# Patient Record
Sex: MALE | Race: OTHER | NOT HISPANIC OR LATINO | URBAN - METROPOLITAN AREA
[De-identification: names, ages, dates, MRNs, and addresses within clinical notes are randomized per-mention and may not be internally consistent; named-entity substitution may affect disease eponyms.]

---

## 2018-08-02 ENCOUNTER — INPATIENT (INPATIENT)
Facility: HOSPITAL | Age: 78
LOS: 4 days | Discharge: HOME | DRG: 246 | End: 2018-08-07
Attending: INTERNAL MEDICINE | Admitting: INTERNAL MEDICINE
Payer: MEDICAID

## 2018-08-02 VITALS
SYSTOLIC BLOOD PRESSURE: 151 MMHG | HEIGHT: 66 IN | WEIGHT: 177.69 LBS | HEART RATE: 82 BPM | DIASTOLIC BLOOD PRESSURE: 83 MMHG | OXYGEN SATURATION: 98 % | RESPIRATION RATE: 19 BRPM | TEMPERATURE: 98 F

## 2018-08-02 DIAGNOSIS — I21.3 ST ELEVATION (STEMI) MYOCARDIAL INFARCTION OF UNSPECIFIED SITE: ICD-10-CM

## 2018-08-02 DIAGNOSIS — Z98.890 OTHER SPECIFIED POSTPROCEDURAL STATES: Chronic | ICD-10-CM

## 2018-08-02 LAB
ALBUMIN SERPL ELPH-MCNC: 4.5 G/DL — SIGNIFICANT CHANGE UP (ref 3.3–5)
ALP SERPL-CCNC: 78 U/L — SIGNIFICANT CHANGE UP (ref 40–120)
ALT FLD-CCNC: 21 U/L — SIGNIFICANT CHANGE UP (ref 10–45)
ANION GAP SERPL CALC-SCNC: 12 MMOL/L — SIGNIFICANT CHANGE UP (ref 5–17)
APTT BLD: 32.4 SEC — SIGNIFICANT CHANGE UP (ref 27.5–37.4)
APTT BLD: >200 SEC — CRITICAL HIGH (ref 27.5–37.4)
AST SERPL-CCNC: 27 U/L — SIGNIFICANT CHANGE UP (ref 10–40)
BILIRUB SERPL-MCNC: 0.6 MG/DL — SIGNIFICANT CHANGE UP (ref 0.2–1.2)
BLD GP AB SCN SERPL QL: NEGATIVE — SIGNIFICANT CHANGE UP
BUN SERPL-MCNC: 10 MG/DL — SIGNIFICANT CHANGE UP (ref 7–23)
CALCIUM SERPL-MCNC: 8.8 MG/DL — SIGNIFICANT CHANGE UP (ref 8.4–10.5)
CHLORIDE SERPL-SCNC: 99 MMOL/L — SIGNIFICANT CHANGE UP (ref 96–108)
CHOLEST SERPL-MCNC: 109 MG/DL — SIGNIFICANT CHANGE UP (ref 10–199)
CK MB BLD-MCNC: 1.8 % — SIGNIFICANT CHANGE UP (ref 0–3.5)
CK MB BLD-MCNC: 3.4 % — SIGNIFICANT CHANGE UP (ref 0–3.5)
CK MB CFR SERPL CALC: 5.1 NG/ML — SIGNIFICANT CHANGE UP (ref 0–6.7)
CK MB CFR SERPL CALC: 8.8 NG/ML — HIGH (ref 0–6.7)
CK SERPL-CCNC: 261 U/L — HIGH (ref 30–200)
CK SERPL-CCNC: 290 U/L — HIGH (ref 30–200)
CO2 SERPL-SCNC: 22 MMOL/L — SIGNIFICANT CHANGE UP (ref 22–31)
CREAT SERPL-MCNC: 1.09 MG/DL — SIGNIFICANT CHANGE UP (ref 0.5–1.3)
GLUCOSE SERPL-MCNC: 103 MG/DL — HIGH (ref 70–99)
HBA1C BLD-MCNC: 5.5 % — SIGNIFICANT CHANGE UP (ref 4–5.6)
HCT VFR BLD CALC: 42.7 % — SIGNIFICANT CHANGE UP (ref 39–50)
HDLC SERPL-MCNC: 43 MG/DL — SIGNIFICANT CHANGE UP (ref 40–125)
HGB BLD-MCNC: 14.6 G/DL — SIGNIFICANT CHANGE UP (ref 13–17)
INR BLD: 1.36 RATIO — HIGH (ref 0.88–1.16)
LIPID PNL WITH DIRECT LDL SERPL: 62 MG/DL — SIGNIFICANT CHANGE UP
MAGNESIUM SERPL-MCNC: 2 MG/DL — SIGNIFICANT CHANGE UP (ref 1.6–2.6)
MCHC RBC-ENTMCNC: 31.7 PG — SIGNIFICANT CHANGE UP (ref 27–34)
MCHC RBC-ENTMCNC: 34.1 GM/DL — SIGNIFICANT CHANGE UP (ref 32–36)
MCV RBC AUTO: 93 FL — SIGNIFICANT CHANGE UP (ref 80–100)
PHOSPHATE SERPL-MCNC: 2.8 MG/DL — SIGNIFICANT CHANGE UP (ref 2.5–4.5)
PLATELET # BLD AUTO: 163 K/UL — SIGNIFICANT CHANGE UP (ref 150–400)
POTASSIUM SERPL-MCNC: 4.2 MMOL/L — SIGNIFICANT CHANGE UP (ref 3.5–5.3)
POTASSIUM SERPL-SCNC: 4.2 MMOL/L — SIGNIFICANT CHANGE UP (ref 3.5–5.3)
PROT SERPL-MCNC: 8.1 G/DL — SIGNIFICANT CHANGE UP (ref 6–8.3)
PROTHROM AB SERPL-ACNC: 14.8 SEC — HIGH (ref 9.8–12.7)
RBC # BLD: 4.59 M/UL — SIGNIFICANT CHANGE UP (ref 4.2–5.8)
RBC # FLD: 11.9 % — SIGNIFICANT CHANGE UP (ref 10.3–14.5)
RH IG SCN BLD-IMP: NEGATIVE — SIGNIFICANT CHANGE UP
SODIUM SERPL-SCNC: 133 MMOL/L — LOW (ref 135–145)
TOTAL CHOLESTEROL/HDL RATIO MEASUREMENT: 2.5 RATIO — LOW (ref 3.4–9.6)
TRIGL SERPL-MCNC: 20 MG/DL — SIGNIFICANT CHANGE UP (ref 10–149)
TROPONIN T, HIGH SENSITIVITY RESULT: 23 NG/L — SIGNIFICANT CHANGE UP (ref 0–51)
TROPONIN T, HIGH SENSITIVITY RESULT: 90 NG/L — HIGH (ref 0–51)
WBC # BLD: 6.3 K/UL — SIGNIFICANT CHANGE UP (ref 3.8–10.5)
WBC # FLD AUTO: 6.3 K/UL — SIGNIFICANT CHANGE UP (ref 3.8–10.5)

## 2018-08-02 PROCEDURE — 99223 1ST HOSP IP/OBS HIGH 75: CPT | Mod: GC

## 2018-08-02 PROCEDURE — 93306 TTE W/DOPPLER COMPLETE: CPT | Mod: 26

## 2018-08-02 PROCEDURE — 93458 L HRT ARTERY/VENTRICLE ANGIO: CPT | Mod: 26,59

## 2018-08-02 PROCEDURE — 92941 PRQ TRLML REVSC TOT OCCL AMI: CPT | Mod: LD

## 2018-08-02 PROCEDURE — 99152 MOD SED SAME PHYS/QHP 5/>YRS: CPT

## 2018-08-02 PROCEDURE — 93010 ELECTROCARDIOGRAM REPORT: CPT

## 2018-08-02 RX ORDER — HEPARIN SODIUM 5000 [USP'U]/ML
INJECTION INTRAVENOUS; SUBCUTANEOUS
Qty: 25000 | Refills: 0 | Status: DISCONTINUED | OUTPATIENT
Start: 2018-08-02 | End: 2018-08-03

## 2018-08-02 RX ORDER — HEPARIN SODIUM 5000 [USP'U]/ML
6500 INJECTION INTRAVENOUS; SUBCUTANEOUS ONCE
Qty: 0 | Refills: 0 | Status: COMPLETED | OUTPATIENT
Start: 2018-08-02 | End: 2018-08-02

## 2018-08-02 RX ORDER — ATORVASTATIN CALCIUM 80 MG/1
80 TABLET, FILM COATED ORAL AT BEDTIME
Qty: 0 | Refills: 0 | Status: DISCONTINUED | OUTPATIENT
Start: 2018-08-02 | End: 2018-08-06

## 2018-08-02 RX ORDER — CLOPIDOGREL BISULFATE 75 MG/1
75 TABLET, FILM COATED ORAL DAILY
Qty: 0 | Refills: 0 | Status: DISCONTINUED | OUTPATIENT
Start: 2018-08-02 | End: 2018-08-07

## 2018-08-02 RX ORDER — ASPIRIN/CALCIUM CARB/MAGNESIUM 324 MG
81 TABLET ORAL DAILY
Qty: 0 | Refills: 0 | Status: DISCONTINUED | OUTPATIENT
Start: 2018-08-02 | End: 2018-08-07

## 2018-08-02 RX ORDER — HEPARIN SODIUM 5000 [USP'U]/ML
6500 INJECTION INTRAVENOUS; SUBCUTANEOUS EVERY 6 HOURS
Qty: 0 | Refills: 0 | Status: DISCONTINUED | OUTPATIENT
Start: 2018-08-02 | End: 2018-08-03

## 2018-08-02 RX ORDER — HEPARIN SODIUM 5000 [USP'U]/ML
3000 INJECTION INTRAVENOUS; SUBCUTANEOUS EVERY 6 HOURS
Qty: 0 | Refills: 0 | Status: DISCONTINUED | OUTPATIENT
Start: 2018-08-02 | End: 2018-08-03

## 2018-08-02 RX ADMIN — HEPARIN SODIUM 1500 UNIT(S)/HR: 5000 INJECTION INTRAVENOUS; SUBCUTANEOUS at 20:37

## 2018-08-02 RX ADMIN — HEPARIN SODIUM 6500 UNIT(S): 5000 INJECTION INTRAVENOUS; SUBCUTANEOUS at 20:37

## 2018-08-02 RX ADMIN — ATORVASTATIN CALCIUM 80 MILLIGRAM(S): 80 TABLET, FILM COATED ORAL at 21:06

## 2018-08-02 NOTE — H&P ADULT - NSHPPHYSICALEXAM_GEN_ALL_CORE
OBJECTIVE:  Vital Signs Last 24 Hrs  T(C): 36.4 (02 Aug 2018 12:08), Max: 36.4 (02 Aug 2018 12:08)  T(F): 97.6 (02 Aug 2018 12:08), Max: 97.6 (02 Aug 2018 12:08)  HR: 68 (02 Aug 2018 14:05) (68 - 82)  BP: 127/74 (02 Aug 2018 14:05) (115/74 - 151/83)  BP(mean): 93 (02 Aug 2018 14:05) (89 - 117)  RR: 14 (02 Aug 2018 14:05) (12 - 23)  SpO2: 97% (02 Aug 2018 14:05) (96% - 98%)    08-02 @ 07:01  -  08-02 @ 14:36  --------------------------------------------------------  IN: 0 mL / OUT: 925 mL / NET: -925 mL        General: Alert and cooperative. Not in acute stress. Well developed, well nourished.   Head: Normocephalic, no mass and lesions.  Eyes: Intact visual fields. PERRLA, clear conjunctiva. EOMI, no ptosis.   Throat: Oral cavity and pharynx normal. No inflammation, swelling, exudate, or lesions. Teeth and gingiva in good general condition.  Neck: No lymphadenopathy, no masses, no thyromegaly. Carotid pulses 2+. No JVD.   Respiratory: Bilateral lung clear to auscultation, no crackles, no wheezes, no rhonchi.   Cardiovascular: S1/S2 auscultated, no murmur, or gallop. Rhythm is regular. There is no peripheral edema, cyanosis or pallor. Extremities are warm and well perfused. Capillary refill is less than 2 seconds. No carotid bruits.  Abdomen: Soft, non-tender, nondistended, no guarding or rebound tenderness. Active bowel sounds in all 4 quadrants. No hepatosplenomegaly.   Musculoskeletal: Adequately aligned spine. ROM intact spine and extremities. No joint erythema or tenderness. Normal muscular development. Normal gait.   Extremities: No significant deformity or joint abnormality. Peripheral pulses intact. No varicosities. No peripheral edema, atrophy.   Skin: Intact, no rash. Normal color, texture and turgor with no lesions or eruptions.  Neurological: AOAx4. CN2-12 grosslly intact. Strength and sensation symmetric and intact throughout. Reflexes 2+ throughout. Cerebellar testing normal.  Psychiatry: The mental examination revealed the patient was oriented to person, place, and time. The patient was able to demonstrate good judgement and reason, without hallucinations, abnormal affect or abnormal behaviors during the examination. Patient is not suicidal. OBJECTIVE:  Vital Signs Last 24 Hrs  T(C): 36.4 (02 Aug 2018 12:08), Max: 36.4 (02 Aug 2018 12:08)  T(F): 97.6 (02 Aug 2018 12:08), Max: 97.6 (02 Aug 2018 12:08)  HR: 68 (02 Aug 2018 14:05) (68 - 82)  BP: 127/74 (02 Aug 2018 14:05) (115/74 - 151/83)  BP(mean): 93 (02 Aug 2018 14:05) (89 - 117)  RR: 14 (02 Aug 2018 14:05) (12 - 23)  SpO2: 97% (02 Aug 2018 14:05) (96% - 98%)    08-02 @ 07:01  -  08-02 @ 14:36  --------------------------------------------------------  IN: 0 mL / OUT: 925 mL / NET: -925 mL    General: Alert and cooperative. Not in acute stress. Well developed, well nourished.   Head: Normocephalic, no mass and lesions.  Eyes: Intact visual fields. PERRLA, clear conjunctiva. EOMI, no ptosis.   Neck: No lymphadenopathy, no masses. No JVD.   Respiratory: Bilateral lung clear to auscultation, no crackles, no wheezes, no rhonchi.   Cardiovascular: S1/S2 auscultated, no murmur, or gallop. Rhythm is regular. There is no peripheral edema, cyanosis or pallor. Extremities are warm and well perfused.  Abdomen: Soft, non-tender, nondistended.  Active bowel sounds in all 4 quadrants.   Extremities: No significant deformity or joint abnormality. Peripheral pulses intact. No peripheral edema.  Skin: Intact, no rash.   Neurological: AOAx4. CN2-12 grosslly intact.

## 2018-08-02 NOTE — H&P ADULT - NSHPLABSRESULTS_GEN_ALL_CORE
14.6   6.3   )-----------( 163      ( 02 Aug 2018 13:52 )             42.7     Hgb Trend: 14.6<--  08-02    133<L>  |  99  |  10  ----------------------------<  103<H>  4.2   |  22  |  1.09    Ca    8.8      02 Aug 2018 13:52  Phos  2.8     08-02  Mg     2.0     08-02    TPro  8.1  /  Alb  4.5  /  TBili  0.6  /  DBili  x   /  AST  27  /  ALT  21  /  AlkPhos  78  08-02    Creatinine Trend: 1.09<--

## 2018-08-02 NOTE — H&P ADULT - ASSESSMENT
77 year old Lao male with past medical history of HTN and HLD, presenting from outside ED with chest pain and EKG consistent with a STEMI (ST elevation in II, III and AVF), given , Plavix 600, Lopressor 25 and Heparin 5000 U at OSH. 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 EF 40% and LVEDP 14    Neuro   No active issues    Resp  No active issues   Card  Will c/w ASA 81, Plavix 75, Lipitor 80 (hold home Atenolol – would use Lopressor in the acute setting if BP stable, clinically not in HF), hold home Captopril (given that pt will get staged tomorrow and will receive another contrast load), hold home Isordil as well. Check TTE 77 year old Croatian male with past medical history of HTN and HLD, presenting from outside ED with chest pain and EKG consistent with a STEMI (ST elevation in II, III and AVF), given , Plavix 600, Lopressor 25 and Heparin 5000 U at OSH. 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 EF 40% and LVEDP 14    Neuro   No active issues    Pulmn  No active issues     Cards  STEMI (II, III, AVF) s/p cath (8/2/2018); mLAD with 99% stenosis, LCircumflex ?80% and RPDA 99%, with 2 stents to the mLAD and 1 to the pLAD.  HTN  Aspirin 81, Plavix 75 and Lipitor 80  Hold home Atenolol, Captopril and Isordil  If BP stable and pt not with signs/symptoms of heart failure, may consider starting Lopressor   F/U TTE     Renal  No acute issues    Heme   No acute issues

## 2018-08-02 NOTE — H&P ADULT - HISTORY OF PRESENT ILLNESS
CHIEF COMPLAINT: Chest Pain    77 year old Eritrean male with a history of HTN, HLD presented from outside hospital for chest pain. At around 3 am on 8/2/2018 patient began experiencing severe left sided chest pain and tightness radiating to the left shoulder. Pain was described as 10/10 in intensity and pa CHIEF COMPLAINT: Chest Pain    77 year old Venezuelan male with a history of HTN, HLD presented from outside hospital for chest pain. At around 3 am on 8/2/2018 patient began experiencing severe left sided chest pain and tightness radiating to the left shoulder. Pain was described as 10/10 in intensity, unable to describe quality. There was no other associated symptoms such as SOB, or diaphoresis. He reported relief of symptoms shortly after taking nitroglycerin and isordil. He remained without pain after the episode and was subsequently taken to the [facility] ED by his daughter at around 9:00 am. CHIEF COMPLAINT: Chest Pain    77 year old Angolan male with a history of HTN, HLD presented from outside hospital for chest pain. At around 3 am on 8/2/2018 patient began experiencing severe left sided chest pain and tightness radiating to the left shoulder. Pain was described as 10/10 in intensity, unable to describe quality. There was no other associated symptoms such as SOB, or diaphoresis. He reported relief of symptoms shortly after taking nitroglycerin and isordil. He remained without pain after the episode and was subsequently taken to the [facility] ED by his daughter at around 9:00 am. At the ED in [place] he was found to have an MI with EKG showing ST Elevations and anterolateral infarcts, he was given Plavix 600mg and Aspirin 325, and transferred to [facility] for higher level of care. At [place] ED pt had /85, RR 18, HR 67. SPO2 99% on RA.     Upon arrival to [facility], pt was taken to the cath lab where Hrt cath showed mLAD with 99% stenosis, LCircumflex ?80% and RPDA 99%. She was CHIEF COMPLAINT: Chest Pain    77 year old Citizen of the [place] male with a history of HTN, HLD presented from outside hospital for chest pain. At around 3 am on 8/2/2018 patient began experiencing severe left sided chest pain and tightness radiating to the left shoulder. Pain was described as 10/10 in intensity, unable to describe quality. There was no other associated symptoms such as SOB, or diaphoresis. He reported relief of symptoms shortly after taking nitroglycerin and isordil. He remained without pain after the episode and was subsequently taken to the [facility] ED by his daughter at around 9:00 am. At the ED in [place] he was found to have an MI with EKG showing STEMI with ST elevations in II, III and AVF.  Pt had /85, RR 18, HR 67. SPO2 99% on RA. He was given Plavix 600mg and Aspirin 325, Lopressor 25 and Heparin 5000 U. He was then transferred to [facility] for higher level of care.     Upon arrival to [facility], pt was taken to the cath lab where Hrt cath showed mLAD with 99% stenosis, LCircumflex ?80% and RPDA 99%. He was CHIEF COMPLAINT: Chest Pain    77 year old Emirati male with a history of HTN, HLD presented from outside hospital for chest pain. At around 3 am on 8/2/2018 patient began experiencing severe left sided chest pain and tightness radiating to the left shoulder. Pain was described as 10/10 in intensity, unable to describe quality. There was no other associated symptoms such as SOB, or diaphoresis. He reported relief of symptoms shortly after taking nitroglycerin and isordil. He remained without pain after the episode and was subsequently taken to the [facility] ED by his daughter at around 9:00 am. At the ED in [place] he was found to have an MI with EKG showing STEMI with ST elevations in II, III and AVF.  Pt had /85, RR 18, HR 67. SPO2 99% on RA. He was given Plavix 600mg and Aspirin 325, Lopressor 25 and Heparin 5000 U. He was then transferred to [facility] for higher level of care.     Upon arrival to [facility], pt was taken to the cath lab where Hrt cath showed mLAD with 99% stenosis, LCircumflex ?80% and RPDA 99%, with 2 stents to the mLAD and 1 to the pLAD. He was then transferred to the CCU for further management with plan for staging tomorrow.     Of note, patient had similar episode in May, and was hospitalized for 11 days in [facility]. Otherwise has had history of CP pain with significant exertion, usually relieved with rest.

## 2018-08-02 NOTE — CHART NOTE - NSCHARTNOTEFT_GEN_A_CORE
8/2/2018  Removal of Radial Band    Pulses in the right upper extremity are palpable.  The patient was placed in the supine position. The insertion site was identified and the band deflated per protocol. The radial band was removed slowly. Direct pressure was applied for  5 minutes.  Monitoring of the (right/left) wrists and both upper extremities including neuro-vascular checks and vital signs every 15 minutes x 4.

## 2018-08-02 NOTE — H&P ADULT - NSHPREVIEWOFSYSTEMS_GEN_ALL_CORE
Constitutional: No fever, or chills. No recent weight loss or weight gain.   HEENT: No dry eyes or eye irritation. No postnasal drip or nasal congestion.  CV: No chest pain, or palpitations. No orthopnea.   Resp: No cough, or sputum production. No shortness of breath or dyspnea on exertion.   GI: No nausea or vomiting. No diarrhea or constipation. No abdominal pain.   : No dysuria, no nocturia or increased urinary frequency.  Musculoskeletal: No back pain, no myalgias  Skin: No rash or itchiness.   Neurological: No headache or dizziness. No syncope, no weakness, numbness.  Psychiatric: Denies depressed mood.   Endocrine: No cold or heat intolerance. No dry skin.  Hematologic/Lymphatic: No anemia or bleeding problem. Constitutional: No fever, or chills. No recent weight loss or weight gain.   HEENT: No dry eyes or eye irritation. No postnasal drip or nasal congestion.  CV: No chest pain, or palpitations. No orthopnea.   Resp: No cough, or sputum production. No shortness of breath or dyspnea on exertion.   GI: No nausea or vomiting. No diarrhea or constipation. No abdominal pain.   : No dysuria, no nocturia or increased urinary frequency.  Musculoskeletal: No back pain, no myalgias  Skin: No rash or itchiness.   Neurological: No headache or dizziness. No syncope, no weakness, numbness.

## 2018-08-03 LAB
ALBUMIN SERPL ELPH-MCNC: 3.9 G/DL — SIGNIFICANT CHANGE UP (ref 3.3–5)
ALP SERPL-CCNC: 66 U/L — SIGNIFICANT CHANGE UP (ref 40–120)
ALT FLD-CCNC: 19 U/L — SIGNIFICANT CHANGE UP (ref 10–45)
ANION GAP SERPL CALC-SCNC: 11 MMOL/L — SIGNIFICANT CHANGE UP (ref 5–17)
APTT BLD: 144.6 SEC — CRITICAL HIGH (ref 27.5–37.4)
APTT BLD: >200 SEC — CRITICAL HIGH (ref 27.5–37.4)
AST SERPL-CCNC: 26 U/L — SIGNIFICANT CHANGE UP (ref 10–40)
BILIRUB SERPL-MCNC: 0.7 MG/DL — SIGNIFICANT CHANGE UP (ref 0.2–1.2)
BUN SERPL-MCNC: 13 MG/DL — SIGNIFICANT CHANGE UP (ref 7–23)
CALCIUM SERPL-MCNC: 8.6 MG/DL — SIGNIFICANT CHANGE UP (ref 8.4–10.5)
CHLORIDE SERPL-SCNC: 103 MMOL/L — SIGNIFICANT CHANGE UP (ref 96–108)
CK MB BLD-MCNC: 4.1 % — HIGH (ref 0–3.5)
CK MB CFR SERPL CALC: 8.8 NG/ML — HIGH (ref 0–6.7)
CK SERPL-CCNC: 213 U/L — HIGH (ref 30–200)
CO2 SERPL-SCNC: 20 MMOL/L — LOW (ref 22–31)
CREAT SERPL-MCNC: 1.07 MG/DL — SIGNIFICANT CHANGE UP (ref 0.5–1.3)
GLUCOSE SERPL-MCNC: 103 MG/DL — HIGH (ref 70–99)
HCT VFR BLD CALC: 39.4 % — SIGNIFICANT CHANGE UP (ref 39–50)
HCT VFR BLD CALC: 41.1 % — SIGNIFICANT CHANGE UP (ref 39–50)
HGB BLD-MCNC: 13.5 G/DL — SIGNIFICANT CHANGE UP (ref 13–17)
HGB BLD-MCNC: 14.1 G/DL — SIGNIFICANT CHANGE UP (ref 13–17)
INR BLD: 1.31 RATIO — HIGH (ref 0.88–1.16)
MAGNESIUM SERPL-MCNC: 2 MG/DL — SIGNIFICANT CHANGE UP (ref 1.6–2.6)
MCHC RBC-ENTMCNC: 31.9 PG — SIGNIFICANT CHANGE UP (ref 27–34)
MCHC RBC-ENTMCNC: 32 PG — SIGNIFICANT CHANGE UP (ref 27–34)
MCHC RBC-ENTMCNC: 34.2 GM/DL — SIGNIFICANT CHANGE UP (ref 32–36)
MCHC RBC-ENTMCNC: 34.3 GM/DL — SIGNIFICANT CHANGE UP (ref 32–36)
MCV RBC AUTO: 93.2 FL — SIGNIFICANT CHANGE UP (ref 80–100)
MCV RBC AUTO: 93.4 FL — SIGNIFICANT CHANGE UP (ref 80–100)
PHOSPHATE SERPL-MCNC: 3.6 MG/DL — SIGNIFICANT CHANGE UP (ref 2.5–4.5)
PLATELET # BLD AUTO: 151 K/UL — SIGNIFICANT CHANGE UP (ref 150–400)
PLATELET # BLD AUTO: 151 K/UL — SIGNIFICANT CHANGE UP (ref 150–400)
POTASSIUM SERPL-MCNC: 4 MMOL/L — SIGNIFICANT CHANGE UP (ref 3.5–5.3)
POTASSIUM SERPL-SCNC: 4 MMOL/L — SIGNIFICANT CHANGE UP (ref 3.5–5.3)
PROT SERPL-MCNC: 7.1 G/DL — SIGNIFICANT CHANGE UP (ref 6–8.3)
PROTHROM AB SERPL-ACNC: 14.2 SEC — HIGH (ref 9.8–12.7)
RBC # BLD: 4.22 M/UL — SIGNIFICANT CHANGE UP (ref 4.2–5.8)
RBC # BLD: 4.41 M/UL — SIGNIFICANT CHANGE UP (ref 4.2–5.8)
RBC # FLD: 12.2 % — SIGNIFICANT CHANGE UP (ref 10.3–14.5)
RBC # FLD: 12.3 % — SIGNIFICANT CHANGE UP (ref 10.3–14.5)
SODIUM SERPL-SCNC: 134 MMOL/L — LOW (ref 135–145)
TROPONIN T, HIGH SENSITIVITY RESULT: 129 NG/L — HIGH (ref 0–51)
TSH SERPL-MCNC: 1.87 UIU/ML — SIGNIFICANT CHANGE UP (ref 0.27–4.2)
WBC # BLD: 6.9 K/UL — SIGNIFICANT CHANGE UP (ref 3.8–10.5)
WBC # BLD: 7.1 K/UL — SIGNIFICANT CHANGE UP (ref 3.8–10.5)
WBC # FLD AUTO: 6.9 K/UL — SIGNIFICANT CHANGE UP (ref 3.8–10.5)
WBC # FLD AUTO: 7.1 K/UL — SIGNIFICANT CHANGE UP (ref 3.8–10.5)

## 2018-08-03 PROCEDURE — 92928 PRQ TCAT PLMT NTRAC ST 1 LES: CPT | Mod: LC

## 2018-08-03 PROCEDURE — 99152 MOD SED SAME PHYS/QHP 5/>YRS: CPT

## 2018-08-03 PROCEDURE — 93010 ELECTROCARDIOGRAM REPORT: CPT

## 2018-08-03 PROCEDURE — 99233 SBSQ HOSP IP/OBS HIGH 50: CPT | Mod: GC

## 2018-08-03 RX ORDER — HEPARIN SODIUM 5000 [USP'U]/ML
6500 INJECTION INTRAVENOUS; SUBCUTANEOUS EVERY 6 HOURS
Qty: 0 | Refills: 0 | Status: DISCONTINUED | OUTPATIENT
Start: 2018-08-03 | End: 2018-08-06

## 2018-08-03 RX ORDER — HEPARIN SODIUM 5000 [USP'U]/ML
3000 INJECTION INTRAVENOUS; SUBCUTANEOUS EVERY 6 HOURS
Qty: 0 | Refills: 0 | Status: DISCONTINUED | OUTPATIENT
Start: 2018-08-03 | End: 2018-08-06

## 2018-08-03 RX ORDER — WARFARIN SODIUM 2.5 MG/1
7.5 TABLET ORAL ONCE
Qty: 0 | Refills: 0 | Status: COMPLETED | OUTPATIENT
Start: 2018-08-03 | End: 2018-08-03

## 2018-08-03 RX ORDER — HEPARIN SODIUM 5000 [USP'U]/ML
6500 INJECTION INTRAVENOUS; SUBCUTANEOUS EVERY 6 HOURS
Qty: 0 | Refills: 0 | Status: DISCONTINUED | OUTPATIENT
Start: 2018-08-03 | End: 2018-08-03

## 2018-08-03 RX ORDER — METOPROLOL TARTRATE 50 MG
25 TABLET ORAL
Qty: 0 | Refills: 0 | Status: DISCONTINUED | OUTPATIENT
Start: 2018-08-03 | End: 2018-08-05

## 2018-08-03 RX ORDER — HEPARIN SODIUM 5000 [USP'U]/ML
1200 INJECTION INTRAVENOUS; SUBCUTANEOUS
Qty: 25000 | Refills: 0 | Status: DISCONTINUED | OUTPATIENT
Start: 2018-08-03 | End: 2018-08-06

## 2018-08-03 RX ADMIN — HEPARIN SODIUM 1200 UNIT(S)/HR: 5000 INJECTION INTRAVENOUS; SUBCUTANEOUS at 16:35

## 2018-08-03 RX ADMIN — Medication 25 MILLIGRAM(S): at 11:59

## 2018-08-03 RX ADMIN — HEPARIN SODIUM 1200 UNIT(S)/HR: 5000 INJECTION INTRAVENOUS; SUBCUTANEOUS at 04:36

## 2018-08-03 RX ADMIN — HEPARIN SODIUM 0 UNIT(S)/HR: 5000 INJECTION INTRAVENOUS; SUBCUTANEOUS at 23:52

## 2018-08-03 RX ADMIN — HEPARIN SODIUM 0 UNIT(S)/HR: 5000 INJECTION INTRAVENOUS; SUBCUTANEOUS at 03:11

## 2018-08-03 RX ADMIN — Medication 81 MILLIGRAM(S): at 11:59

## 2018-08-03 RX ADMIN — Medication 25 MILLIGRAM(S): at 21:22

## 2018-08-03 RX ADMIN — WARFARIN SODIUM 7.5 MILLIGRAM(S): 2.5 TABLET ORAL at 21:22

## 2018-08-03 RX ADMIN — CLOPIDOGREL BISULFATE 75 MILLIGRAM(S): 75 TABLET, FILM COATED ORAL at 11:59

## 2018-08-03 RX ADMIN — ATORVASTATIN CALCIUM 80 MILLIGRAM(S): 80 TABLET, FILM COATED ORAL at 21:22

## 2018-08-03 NOTE — PROGRESS NOTE ADULT - ASSESSMENT
77 year old Nauruan male with past medical history of HTN and HLD, presenting from outside ED with chest pain and EKG consistent with a STEMI (ST elevation in II, III and AVF), given , Plavix 600, Lopressor 25 and Heparin 5000 U at OSH. 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 EF 40% and LVEDP 14    Neuro   No active issues    Pulmn  No active issues     Cards  STEMI (II, III, AVF) s/p cath (8/2/2018); mLAD with 99% stenosis, LCircumflex ?80% and RPDA 99%, with 2 stents to the mLAD and 1 to the pLAD.  HTN  Aspirin 81, Plavix 75 and Lipitor 80  Hold home Atenolol, Captopril and Isordil  If BP stable and pt not with signs/symptoms of heart failure, may consider starting Lopressor   F/U TTE     Renal  No acute issues    Heme   No acute issues        Antonia Randall PGY1  Internal Medicine 77 year old Belgian male with past medical history of HTN and HLD, presenting from outside ED with chest pain and EKG consistent with a STEMI (ST elevation in II, III and AVF), given , Plavix 600, Lopressor 25 and Heparin 5000 U at OSH. 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 EF 40% and LVEDP 14    Neuro   No active issues    Pulmn  No active issues     Cards  STEMI (II, III, AVF) s/p cath (8/2/2018); mLAD with 99% stenosis, LCircumflex ?80% and RPDA 99%, with 2 stents to the mLAD and 1 to the pLAD.  HTN  Aspirin 81, Plavix 75 and Lipitor 80  Hold home Atenolol, Captopril and Isordil  If BP stable and pt not with signs/symptoms of heart failure, may consider starting Lopressor   F/U TTE   Heparin and Coumadin  Start Lopressor 25 BID     Renal  No acute issues    Heme   No acute issues        Antonia Randall PGY1  Internal Medicine 77 year old Irish male with past medical history of HTN and HLD, presenting from outside ED with chest pain and EKG consistent with a STEMI (ST elevation in II, III and AVF), given , Plavix 600, Lopressor 25 and Heparin 5000 U at OSH. 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 EF 40% and LVEDP 14    Neuro   No active issues    Pulmn  No active issues     Cards  STEMI (II, III, AVF) s/p cath (8/2/2018); mLAD with 99% stenosis, LCircumflex ?80% and RPDA 99%, with 2 stents to the mLAD and 1 to the pLAD.  HTN  Aspirin 81, Plavix 75 and Lipitor 80  Hold home Atenolol, Captopril and Isordil  If BP stable and pt not with signs/symptoms of heart failure, may consider starting Lopressor   F/U TTE   Heparin and Coumadin; apical thrombus  Start Lopressor 25 BID     Renal  No acute issues    Heme   No acute issues        Antonia Randall PGY1  Internal Medicine

## 2018-08-03 NOTE — CHART NOTE - NSCHARTNOTEFT_GEN_A_CORE
====================  CCU MIDNIGHT ROUNDS  ====================    JIMMIE SANCHEZ  [id number]    ====================  SUMMARY: HPI:  CHIEF COMPLAINT: Chest Pain    77 year old East Timorese male with a history of HTN, HLD presented from outside hospital for chest pain. At around 3 am on 8/2/2018 patient began experiencing severe left sided chest pain and tightness radiating to the left shoulder. Pain was described as 10/10 in intensity, unable to describe quality. There was no other associated symptoms such as SOB, or diaphoresis. He reported relief of symptoms shortly after taking nitroglycerin and isordil. He remained without pain after the episode and was subsequently taken to the [facility] ED by his daughter at around 9:00 am. At the ED in [place] he was found to have an MI with EKG showing STEMI with ST elevations in II, III and AVF.  Pt had /85, RR 18, HR 67. SPO2 99% on RA. He was given Plavix 600mg and Aspirin 325, Lopressor 25 and Heparin 5000 U. He was then transferred to [facility] for higher level of care.     Upon arrival to [facility], pt was taken to the cath lab where Hrt cath showed mLAD with 99% stenosis, LCircumflex ?80% and RPDA 99%, with 2 stents to the mLAD and 1 to the pLAD. He was then transferred to the CCU for further management with plan for staging tomorrow.     Of note, patient had similar episode in May, and was hospitalized for 11 days in [facility]. Otherwise has had history of CP pain with significant exertion, usually relieved with rest. (02 Aug 2018 14:35)    ====================        ====================  NEW EVENTS:  -S/p YECENIA x 2 mLAD, pLAD  ====================        ====================  VITALS (Last 12 hrs):  ====================    T(C): 36.6 (08-02-18 @ 23:00), Max: 37.1 (08-02-18 @ 15:00)  HR: 56 (08-02-18 @ 23:00) (56 - 76)  BP: 125/70 (08-02-18 @ 23:00) (110/69 - 151/79)  BP(mean): 88 (08-02-18 @ 23:00) (82 - 113)  ABP: --  ABP(mean): --  RR: 16 (08-02-18 @ 23:00) (12 - 29)  SpO2: 98% (08-02-18 @ 23:00) (95% - 98%)  Wt(kg): --  CVP(mm Hg): --  CO: --  CI: --  PA: --  PA(mean): --  PA(direct): --  PCWP: --  SVR: --    TELEMETRY:        *BLOOD GAS/ARTERIAL/MIXED/VENOUS  *LACTATE    I&O's Summary    02 Aug 2018 07:01  -  03 Aug 2018 00:25  --------------------------------------------------------  IN: 600 mL / OUT: 1625 mL / NET: -1025 mL        ====================  PLAN:  77 year old [place] male with past medical history of HTN and HLD, presenting from outside ED with chest pain and EKG consistent with a STEMI (ST elevation in II, III and AVF), given , Plavix 600, Lopressor 25 and Heparin 5000 U at OSH. 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 EF 40% and LVEDP 14  -ASA, Plavix, Heparin gtt  -Atorvastatin 80 mg daily, not on ACE and BB  -CE trending up, not yet peaked  -Denies chest pain, n/v, abdominal pain   ====================    HEALTH ISSUES - PROBLEM Dx:        HEALTH ISSUES - R/O PROBLEM Dx:

## 2018-08-03 NOTE — CHART NOTE - NSCHARTNOTEFT_GEN_A_CORE
CCU Transfer Note    Transfer from: CCU    Transfer to: (  ) Medicine    (  ) Telemetry     (   ) RCU        (    ) Palliative         (   ) Stroke Unit       (  ) MICU   (   ) __________________    Accepting Physician:    Signout given to:     HISTORY  77 year old French male with a history of HTN, HLD presented from outside hospital for chest pain. At around 3 am on 8/2/2018 patient began experiencing severe left sided chest pain and tightness radiating to the left shoulder. Pain was described as 10/10 in intensity, unable to describe quality. There was no other associated symptoms such as SOB, or diaphoresis. He reported relief of symptoms shortly after taking nitroglycerin and isordil. He remained without pain after the episode and was subsequently taken to the [facility] ED by his daughter at around 9:00 am. At the ED in [place] he was found to have an MI with EKG showing STEMI with ST elevations in II, III and AVF.  Pt had /85, RR 18, HR 67. SPO2 99% on RA. He was given Plavix 600mg and Aspirin 325, Lopressor 25 and Heparin 5000 U. He was then transferred to [facility] for higher level of care.     Upon arrival to [facility], pt was taken to the cath lab where Hrt cath showed mLAD with 99% stenosis, LCircumflex ?80% and RPDA 99%, with 2 stents to the mLAD and 1 to the pLAD. He was then transferred to the CCU for further management with plan for staging tomorrow.     Of note, patient had similar episode in May, and was hospitalized for 11 days in [facility]. Otherwise has had history of CP pain with significant exertion, usually relieved with rest.     CCU COURSE:  Admitted to the CCU on 8/2 after receiving 2 stents to the mLAD and 1 to the pLAD. He was started on DAPT. Echo was done which showed apical cardiac thrombus and was he was thus started on heparin. On 8/3 he was taken to the       PAST MEDICAL & SURGICAL HISTORY:  Hyperlipidemia  Hypertension  H/O inguinal hernia repair: Right Inguinal Hernia x2  First in 1966  Second time with mesh in 2010    Vital Signs Last 24 Hrs  T(C): 36.8 (03 Aug 2018 08:00), Max: 36.8 (03 Aug 2018 08:00)  T(F): 98.2 (03 Aug 2018 08:00), Max: 98.2 (03 Aug 2018 08:00)  HR: 64 (03 Aug 2018 15:00) (54 - 78)  BP: 124/74 (03 Aug 2018 15:00) (109/65 - 156/83)  BP(mean): 95 (03 Aug 2018 15:00) (82 - 119)  RR: 20 (03 Aug 2018 15:00) (12 - 30)  SpO2: 98% (03 Aug 2018 15:00) (93% - 99%)  I&O's Summary    02 Aug 2018 07:01  -  03 Aug 2018 07:00  --------------------------------------------------------  IN: 723 mL / OUT: 1725 mL / NET: -1002 mL    03 Aug 2018 07:01  -  03 Aug 2018 15:35  --------------------------------------------------------  IN: 582 mL / OUT: 450 mL / NET: 132 mL      Allergies    No Known Allergies    Intolerances      MEDICATIONS  (STANDING):  aspirin  chewable 81 milliGRAM(s) Oral daily  atorvastatin 80 milliGRAM(s) Oral at bedtime  clopidogrel Tablet 75 milliGRAM(s) Oral daily  heparin  Infusion.  Unit(s)/Hr (15 mL/Hr) IV Continuous <Continuous>  metoprolol tartrate 25 milliGRAM(s) Oral two times a day  warfarin 7.5 milliGRAM(s) Oral once    MEDICATIONS  (PRN):  heparin  Injectable 6500 Unit(s) IV Push every 6 hours PRN For aPTT less than 40  heparin  Injectable 3000 Unit(s) IV Push every 6 hours PRN For aPTT between 40 - 57      Adult Advanced Hemodynamics Last 24 Hrs  CVP(mm Hg): --  CVP(cm H2O): --  CO: --  CI: --  PA: --  PA(mean): --  PCWP: --  SVR: --  SVRI: --  PVR: --  PVRI: --    CARDIAC MARKERS ( 03 Aug 2018 02:28 )  x     / x     / 213 U/L / x     / 8.8 ng/mL  CARDIAC MARKERS ( 02 Aug 2018 19:46 )  x     / x     / 261 U/L / x     / 8.8 ng/mL  CARDIAC MARKERS ( 02 Aug 2018 13:52 )  x     / x     / 290 U/L / x     / 5.1 ng/mL                            14.1   7.1   )-----------( 151      ( 03 Aug 2018 02:28 )             41.1     08-03    134<L>  |  103  |  13  ----------------------------<  103<H>  4.0   |  20<L>  |  1.07    Ca    8.6      03 Aug 2018 02:28  Phos  3.6     08-03  Mg     2.0     08-03    TPro  7.1  /  Alb  3.9  /  TBili  0.7  /  DBili  x   /  AST  26  /  ALT  19  /  AlkPhos  66  08-03    PT/INR - ( 03 Aug 2018 02:28 )   PT: 14.2 sec;   INR: 1.31 ratio         PTT - ( 03 Aug 2018 02:28 )  PTT:>200.0 sec  PHYSICAL EXAM:      Constitutional:    Eyes:    ENMT:    Neck:    Breasts:    Back:    Respiratory:    Cardiovascular:    Gastrointestinal:    Genitourinary:    Rectal:    Extremities:    Vascular:    Neurological:    Skin:    Lymph Nodes:    Musculoskeletal:    Psychiatric:        Lactate:    Ekg:  Telemetry:  Echo:  Cardiac Cath:  Stress Test:  Imaging:    ASSESSMENT & PLAN:             FOR FOLLOW UP:      Antonia Randall PGY1  Internal Medicine  [phone] CCU Transfer Note    Transfer from: CCU    Transfer to: (  ) Medicine    (  ) Telemetry     (   ) RCU        (    ) Palliative         (   ) Stroke Unit       (  ) MICU   (   ) __________________    Accepting Physician:    Signout given to:     HISTORY  77 year old Sudanese male with a history of HTN, HLD presented from outside hospital for chest pain. At around 3 am on 8/2/2018 patient began experiencing severe left sided chest pain and tightness radiating to the left shoulder. Pain was described as 10/10 in intensity, unable to describe quality. There was no other associated symptoms such as SOB, or diaphoresis. He reported relief of symptoms shortly after taking nitroglycerin and isordil. He remained without pain after the episode and was subsequently taken to the [facility] ED by his daughter at around 9:00 am. At the ED in [place] he was found to have an MI with EKG showing STEMI with ST elevations in II, III and AVF.  Pt had /85, RR 18, HR 67. SPO2 99% on RA. He was given Plavix 600mg and Aspirin 325, Lopressor 25 and Heparin 5000 U. He was then transferred to [facility] for higher level of care.     Upon arrival to [facility], pt was taken to the cath lab where Hrt cath showed mLAD with 99% stenosis, LCircumflex ?80% and RPDA 99%, with 2 stents to the mLAD and 1 to the pLAD. He was then transferred to the CCU for further management with plan for staging tomorrow.     Of note, patient had similar episode in May, and was hospitalized for 11 days in [facility]. Otherwise has had history of CP pain with significant exertion, usually relieved with rest.     CCU COURSE:  Admitted to the CCU on 8/2 after receiving 2 stents to the mLAD and 1 to the pLAD. He was started on DAPT(Aspirin and Plavix) and Atorvastatin 80. Echo was done which showed EF of 45-50%; also showed apical cardiac thrombus and was he was thus started on heparin. On 8/3 he was taken to the cath lab for staging, and one stent was placed to the circumflex (80% stenosed). Patient remained without chest pain or other symptoms during CCU course.      PAST MEDICAL & SURGICAL HISTORY:  Hyperlipidemia  Hypertension  H/O inguinal hernia repair: Right Inguinal Hernia x2  First in 1966  Second time with mesh in 2010    Vital Signs Last 24 Hrs  T(C): 36.8 (03 Aug 2018 08:00), Max: 36.8 (03 Aug 2018 08:00)  T(F): 98.2 (03 Aug 2018 08:00), Max: 98.2 (03 Aug 2018 08:00)  HR: 64 (03 Aug 2018 15:00) (54 - 78)  BP: 124/74 (03 Aug 2018 15:00) (109/65 - 156/83)  BP(mean): 95 (03 Aug 2018 15:00) (82 - 119)  RR: 20 (03 Aug 2018 15:00) (12 - 30)  SpO2: 98% (03 Aug 2018 15:00) (93% - 99%)  I&O's Summary    02 Aug 2018 07:01  -  03 Aug 2018 07:00  --------------------------------------------------------  IN: 723 mL / OUT: 1725 mL / NET: -1002 mL    03 Aug 2018 07:01  -  03 Aug 2018 15:35  --------------------------------------------------------  IN: 582 mL / OUT: 450 mL / NET: 132 mL      Allergies    No Known Allergies    Intolerances      MEDICATIONS  (STANDING):  aspirin  chewable 81 milliGRAM(s) Oral daily  atorvastatin 80 milliGRAM(s) Oral at bedtime  clopidogrel Tablet 75 milliGRAM(s) Oral daily  heparin  Infusion.  Unit(s)/Hr (15 mL/Hr) IV Continuous <Continuous>  metoprolol tartrate 25 milliGRAM(s) Oral two times a day  warfarin 7.5 milliGRAM(s) Oral once    MEDICATIONS  (PRN):  heparin  Injectable 6500 Unit(s) IV Push every 6 hours PRN For aPTT less than 40  heparin  Injectable 3000 Unit(s) IV Push every 6 hours PRN For aPTT between 40 - 57      Adult Advanced Hemodynamics Last 24 Hrs  CVP(mm Hg): --  CVP(cm H2O): --  CO: --  CI: --  PA: --  PA(mean): --  PCWP: --  SVR: --  SVRI: --  PVR: --  PVRI: --    CARDIAC MARKERS ( 03 Aug 2018 02:28 )  x     / x     / 213 U/L / x     / 8.8 ng/mL  CARDIAC MARKERS ( 02 Aug 2018 19:46 )  x     / x     / 261 U/L / x     / 8.8 ng/mL  CARDIAC MARKERS ( 02 Aug 2018 13:52 )  x     / x     / 290 U/L / x     / 5.1 ng/mL                            14.1   7.1   )-----------( 151      ( 03 Aug 2018 02:28 )             41.1     08-03    134<L>  |  103  |  13  ----------------------------<  103<H>  4.0   |  20<L>  |  1.07    Ca    8.6      03 Aug 2018 02:28  Phos  3.6     08-03  Mg     2.0     08-03    TPro  7.1  /  Alb  3.9  /  TBili  0.7  /  DBili  x   /  AST  26  /  ALT  19  /  AlkPhos  66  08-03    PT/INR - ( 03 Aug 2018 02:28 )   PT: 14.2 sec;   INR: 1.31 ratio         PTT - ( 03 Aug 2018 02:28 )  PTT:>200.0 sec  PHYSICAL EXAM:      Constitutional:    Eyes:    ENMT:    Neck:    Breasts:    Back:    Respiratory:    Cardiovascular:    Gastrointestinal:    Genitourinary:    Rectal:    Extremities:    Vascular:    Neurological:    Skin:    Lymph Nodes:    Musculoskeletal:    Psychiatric:        Lactate:    Ekg:  Telemetry:  Echo:  Cardiac Cath:  Stress Test:  Imaging:    ASSESSMENT & PLAN:             FOR FOLLOW UP:      Antonia Randall PGY1  Internal Medicine  [phone]

## 2018-08-03 NOTE — PROGRESS NOTE ADULT - SUBJECTIVE AND OBJECTIVE BOX
PATIENT:  JIMMIE SANCHEZ  [id number]    CHIEF COMPLAINT:  Patient is a 77y old  Male who presents with a chief complaint of Chest Pain (02 Aug 2018 14:35)      INTERVAL HISTORYOVERNIGHT EVENTS:      REVIEW OF SYSTEMS:    Constitutional:     [ ] negative [ ] fevers [ ] chills [ ] weight loss [ ] weight gain  HEENT:                  [ ] negative [ ] dry eyes [ ] eye irritation [ ] postnasal drip [ ] nasal congestion  CV:                         [ ] negative  [ ] chest pain [ ] orthopnea [ ] palpitations [ ] murmur  Resp:                     [ ] negative [ ] cough [ ] shortness of breath [ ] dyspnea [ ] wheezing [ ] sputum [ ] hemoptysis  GI:                          [ ] negative [ ] nausea [ ] vomiting [ ] diarrhea [ ] constipation [ ] abd pain [ ] dysphagia   :                        [ ] negative [ ] dysuria [ ] nocturia [ ] hematuria [ ] increased urinary frequency  Musculoskeletal: [ ] negative [ ] back pain [ ] myalgias [ ] arthralgias [ ] fracture  Skin:                       [ ] negative [ ] rash [ ] itch  Neurological:        [ ] negative [ ] headache [ ] dizziness [ ] syncope [ ] weakness [ ] numbness  Psychiatric:           [ ] negative [ ] anxiety [ ] depression  Endocrine:            [ ] negative [ ] diabetes [ ] thyroid problem  Heme/Lymph:      [ ] negative [ ] anemia [ ] bleeding problem  Allergic/Immune: [ ] negative [ ] itchy eyes [ ] nasal discharge [ ] hives [ ] angioedema    [ ] All other systems negative  [ ] Unable to assess ROS because ________.    MEDICATIONS:  MEDICATIONS  (STANDING):  aspirin  chewable 81 milliGRAM(s) Oral daily  atorvastatin 80 milliGRAM(s) Oral at bedtime  clopidogrel Tablet 75 milliGRAM(s) Oral daily  heparin  Infusion.  Unit(s)/Hr (15 mL/Hr) IV Continuous <Continuous>    MEDICATIONS  (PRN):  heparin  Injectable 6500 Unit(s) IV Push every 6 hours PRN For aPTT less than 40  heparin  Injectable 3000 Unit(s) IV Push every 6 hours PRN For aPTT between 40 - 57      ALLERGIES:  Allergies    No Known Allergies    Intolerances        OBJECTIVE:  ICU Vital Signs Last 24 Hrs  T(C): 36.7 (03 Aug 2018 05:00), Max: 37.1 (02 Aug 2018 15:00)  T(F): 98 (03 Aug 2018 05:00), Max: 98.8 (02 Aug 2018 15:00)  HR: 62 (03 Aug 2018 07:00) (54 - 82)  BP: 133/72 (03 Aug 2018 07:00) (109/65 - 151/83)  BP(mean): 92 (03 Aug 2018 07:00) (82 - 117)  ABP: --  ABP(mean): --  RR: 13 (03 Aug 2018 07:00) (12 - 30)  SpO2: 97% (03 Aug 2018 07:00) (95% - 98%)      Adult Advanced Hemodynamics Last 24 Hrs  CVP(mm Hg): --  CVP(cm H2O): --  CO: --  CI: --  PA: --  PA(mean): --  PCWP: --  SVR: --  SVRI: --  PVR: --  PVRI: --  CAPILLARY BLOOD GLUCOSE        CAPILLARY BLOOD GLUCOSE        I&O's Summary    02 Aug 2018 07:01  -  03 Aug 2018 07:00  --------------------------------------------------------  IN: 723 mL / OUT: 1725 mL / NET: -1002 mL    03 Aug 2018 07:01  -  03 Aug 2018 07:53  --------------------------------------------------------  IN: 12 mL / OUT: 200 mL / NET: -188 mL      Daily Height in cm: 167.64 (02 Aug 2018 12:08)    Daily     PHYSICAL EXAMINATION:  General: WN/WD NAD  HEENT: PERRLA, EOMI, moist mucous membranes  Neurology: A&Ox3, nonfocal, HARLEY x 4  Respiratory: CTA B/L, normal respiratory effort, no wheezes, crackles, rales  CV: RRR, S1S2, no murmurs, rubs or gallops  Abdominal: Soft, NT, ND +BS, Last BM  Extremities: No edema, + peripheral pulses  Incisions:   Tubes:    LABS:                          14.1   7.1   )-----------( 151      ( 03 Aug 2018 02:28 )             41.1     08-03    134<L>  |  103  |  13  ----------------------------<  103<H>  4.0   |  20<L>  |  1.07    Ca    8.6      03 Aug 2018 02:28  Phos  3.6     08-03  Mg     2.0     08-03    TPro  7.1  /  Alb  3.9  /  TBili  0.7  /  DBili  x   /  AST  26  /  ALT  19  /  AlkPhos  66  08-03    LIVER FUNCTIONS - ( 03 Aug 2018 02:28 )  Alb: 3.9 g/dL / Pro: 7.1 g/dL / ALK PHOS: 66 U/L / ALT: 19 U/L / AST: 26 U/L / GGT: x           PT/INR - ( 03 Aug 2018 02:28 )   PT: 14.2 sec;   INR: 1.31 ratio         PTT - ( 03 Aug 2018 02:28 )  PTT:>200.0 sec  CKMB Units: 8.8 ng/mL (08-03 @ 02:28)  Creatine Kinase, Serum: 213 U/L (08-03 @ 02:28)  Creatine Kinase, Serum: 261 U/L (08-02 @ 19:46)  CKMB Units: 8.8 ng/mL (08-02 @ 19:46)  Creatine Kinase, Serum: 290 U/L (08-02 @ 13:52)  CKMB Units: 5.1 ng/mL (08-02 @ 13:52)    CARDIAC MARKERS ( 03 Aug 2018 02:28 )  x     / x     / 213 U/L / x     / 8.8 ng/mL  CARDIAC MARKERS ( 02 Aug 2018 19:46 )  x     / x     / 261 U/L / x     / 8.8 ng/mL  CARDIAC MARKERS ( 02 Aug 2018 13:52 )  x     / x     / 290 U/L / x     / 5.1 ng/mL          TELEMETRY:     EKG:     IMAGING: PATIENT:  JIMMIE SANCHEZ  [id number]    CHIEF COMPLAINT:  Patient is a 77y old  Male who presents with a chief complaint of Chest Pain (02 Aug 2018 14:35)      INTERVAL HISTORYOVERNIGHT EVENTS:      REVIEW OF SYSTEMS:    Constitutional:     [ ] negative [ ] fevers [ ] chills [ ] weight loss [ ] weight gain  HEENT:                  [ ] negative [ ] dry eyes [ ] eye irritation [ ] postnasal drip [ ] nasal congestion  CV:                         [ ] negative  [ ] chest pain [ ] orthopnea [ ] palpitations [ ] murmur  Resp:                     [ ] negative [ ] cough [ ] shortness of breath [ ] dyspnea [ ] wheezing [ ] sputum [ ] hemoptysis  GI:                          [ ] negative [ ] nausea [ ] vomiting [ ] diarrhea [ ] constipation [ ] abd pain [ ] dysphagia   :                        [ ] negative [ ] dysuria [ ] nocturia [ ] hematuria [ ] increased urinary frequency  Musculoskeletal: [ ] negative [ ] back pain [ ] myalgias [ ] arthralgias [ ] fracture  Skin:                       [ ] negative [ ] rash [ ] itch  Neurological:        [ ] negative [ ] headache [ ] dizziness [ ] syncope [ ] weakness [ ] numbness  Psychiatric:           [ ] negative [ ] anxiety [ ] depression  Endocrine:            [ ] negative [ ] diabetes [ ] thyroid problem  Heme/Lymph:      [ ] negative [ ] anemia [ ] bleeding problem  Allergic/Immune: [ ] negative [ ] itchy eyes [ ] nasal discharge [ ] hives [ ] angioedema    [x] All other systems negative    MEDICATIONS:  MEDICATIONS  (STANDING):  aspirin  chewable 81 milliGRAM(s) Oral daily  atorvastatin 80 milliGRAM(s) Oral at bedtime  clopidogrel Tablet 75 milliGRAM(s) Oral daily  heparin  Infusion.  Unit(s)/Hr (15 mL/Hr) IV Continuous <Continuous>    MEDICATIONS  (PRN):  heparin  Injectable 6500 Unit(s) IV Push every 6 hours PRN For aPTT less than 40  heparin  Injectable 3000 Unit(s) IV Push every 6 hours PRN For aPTT between 40 - 57      ALLERGIES:  Allergies    No Known Allergies    Intolerances        OBJECTIVE:  ICU Vital Signs Last 24 Hrs      T(C): 36.7 (03 Aug 2018 05:00), Max: 37.1 (02 Aug 2018 15:00)  T(F): 98 (03 Aug 2018 05:00), Max: 98.8 (02 Aug 2018 15:00)  HR: 62 (03 Aug 2018 07:00) (54 - 82)  BP: 133/72 (03 Aug 2018 07:00) (109/65 - 151/83)  BP(mean): 92 (03 Aug 2018 07:00) (82 - 117)  ABP: --  ABP(mean): --  RR: 13 (03 Aug 2018 07:00) (12 - 30)  SpO2: 97% (03 Aug 2018 07:00) (95% - 98%)      CAPILLARY BLOOD GLUCOSE      I&O's Summary    02 Aug 2018 07:01  -  03 Aug 2018 07:00  --------------------------------------------------------  IN: 723 mL / OUT: 1725 mL / NET: -1002 mL    03 Aug 2018 07:01  -  03 Aug 2018 07:53  --------------------------------------------------------  IN: 12 mL / OUT: 200 mL / NET: -188 mL      Daily Height in cm: 167.64 (02 Aug 2018 12:08)    Daily     PHYSICAL EXAMINATION:  General: WN/WD NAD  HEENT: PERRLA, EOMI, moist mucous membranes  Neurology: A&Ox3, nonfocal, HARLEY x 4  Respiratory: CTA B/L, normal respiratory effort, no wheezes, crackles, rales  CV: RRR, S1S2, no murmurs,  Abdominal: Soft, NT, ND +BS, Last BM  Extremities: No edema,   Incisions:   Tubes:    LABS:                          14.1   7.1   )-----------( 151      ( 03 Aug 2018 02:28 )             41.1     08-03    134<L>  |  103  |  13  ----------------------------<  103<H>  4.0   |  20<L>  |  1.07    Ca    8.6      03 Aug 2018 02:28  Phos  3.6     08-03  Mg     2.0     08-03    TPro  7.1  /  Alb  3.9  /  TBili  0.7  /  DBili  x   /  AST  26  /  ALT  19  /  AlkPhos  66  08-03    LIVER FUNCTIONS - ( 03 Aug 2018 02:28 )  Alb: 3.9 g/dL / Pro: 7.1 g/dL / ALK PHOS: 66 U/L / ALT: 19 U/L / AST: 26 U/L / GGT: x           PT/INR - ( 03 Aug 2018 02:28 )   PT: 14.2 sec;   INR: 1.31 ratio         PTT - ( 03 Aug 2018 02:28 )  PTT:>200.0 sec  CKMB Units: 8.8 ng/mL (08-03 @ 02:28)  Creatine Kinase, Serum: 213 U/L (08-03 @ 02:28)  Creatine Kinase, Serum: 261 U/L (08-02 @ 19:46)  CKMB Units: 8.8 ng/mL (08-02 @ 19:46)  Creatine Kinase, Serum: 290 U/L (08-02 @ 13:52)  CKMB Units: 5.1 ng/mL (08-02 @ 13:52)    CARDIAC MARKERS ( 03 Aug 2018 02:28 )  x     / x     / 213 U/L / x     / 8.8 ng/mL  CARDIAC MARKERS ( 02 Aug 2018 19:46 )  x     / x     / 261 U/L / x     / 8.8 ng/mL  CARDIAC MARKERS ( 02 Aug 2018 13:52 )  x     / x     / 290 U/L / x     / 5.1 ng/mL          TELEMETRY:     EKG:     IMAGING: PATIENT:  JIMMIE SANCHEZ  [id number]    CHIEF COMPLAINT:  Patient is a 77y old  Male who presents with a chief complaint of Chest Pain (02 Aug 2018 14:35)      INTERVAL HISTORYOVERNIGHT EVENTS:      REVIEW OF SYSTEMS:    Constitutional:     [ ] negative [ ] fevers [ ] chills [ ] weight loss [ ] weight gain  HEENT:                  [ ] negative [ ] dry eyes [ ] eye irritation [ ] postnasal drip [ ] nasal congestion  CV:                         [ ] negative  [ ] chest pain [ ] orthopnea [ ] palpitations [ ] murmur  Resp:                     [ ] negative [ ] cough [ ] shortness of breath [ ] dyspnea [ ] wheezing [ ] sputum [ ] hemoptysis  GI:                          [ ] negative [ ] nausea [ ] vomiting [ ] diarrhea [ ] constipation [ ] abd pain [ ] dysphagia   :                        [ ] negative [ ] dysuria [ ] nocturia [ ] hematuria [ ] increased urinary frequency  Musculoskeletal: [ ] negative [ ] back pain [ ] myalgias [ ] arthralgias [ ] fracture  Skin:                       [ ] negative [ ] rash [ ] itch  Neurological:        [ ] negative [ ] headache [ ] dizziness [ ] syncope [ ] weakness [ ] numbness  Psychiatric:           [ ] negative [ ] anxiety [ ] depression  Endocrine:            [ ] negative [ ] diabetes [ ] thyroid problem  Heme/Lymph:      [ ] negative [ ] anemia [ ] bleeding problem  Allergic/Immune: [ ] negative [ ] itchy eyes [ ] nasal discharge [ ] hives [ ] angioedema    [x] All other systems negative    MEDICATIONS:  MEDICATIONS  (STANDING):  aspirin  chewable 81 milliGRAM(s) Oral daily  atorvastatin 80 milliGRAM(s) Oral at bedtime  clopidogrel Tablet 75 milliGRAM(s) Oral daily  heparin  Infusion.  Unit(s)/Hr (15 mL/Hr) IV Continuous <Continuous>    MEDICATIONS  (PRN):  heparin  Injectable 6500 Unit(s) IV Push every 6 hours PRN For aPTT less than 40  heparin  Injectable 3000 Unit(s) IV Push every 6 hours PRN For aPTT between 40 - 57      ALLERGIES:  Allergies    No Known Allergies    Intolerances        OBJECTIVE:  ICU Vital Signs Last 24 Hrs      T(C): 36.7 (03 Aug 2018 05:00), Max: 37.1 (02 Aug 2018 15:00)  T(F): 98 (03 Aug 2018 05:00), Max: 98.8 (02 Aug 2018 15:00)  HR: 62 (03 Aug 2018 07:00) (54 - 82)  BP: 133/72 (03 Aug 2018 07:00) (109/65 - 151/83)  BP(mean): 92 (03 Aug 2018 07:00) (82 - 117)  ABP: --  ABP(mean): --  RR: 13 (03 Aug 2018 07:00) (12 - 30)  SpO2: 97% (03 Aug 2018 07:00) (95% - 98%)      CAPILLARY BLOOD GLUCOSE      I&O's Summary    02 Aug 2018 07:01  -  03 Aug 2018 07:00  --------------------------------------------------------  IN: 723 mL / OUT: 1725 mL / NET: -1002 mL    03 Aug 2018 07:01  -  03 Aug 2018 07:53  --------------------------------------------------------  IN: 12 mL / OUT: 200 mL / NET: -188 mL      Daily Height in cm: 167.64 (02 Aug 2018 12:08)    Daily     PHYSICAL EXAMINATION:  General: WN/WD NAD  HEENT: PERRLA, EOMI, moist mucous membranes  Neurology: A&Ox3, nonfocal, HARLEY x 4  Respiratory: CTA B/L, normal respiratory effort, no wheezes, crackles, rales  CV: RRR, S1S2, no murmurs,  Abdominal: Soft, NT, ND +BS, Last BM  Extremities: No edema,   Incisions:   Tubes:    LABS:                          14.1   7.1   )-----------( 151      ( 03 Aug 2018 02:28 )             41.1     08-03    134<L>  |  103  |  13  ----------------------------<  103<H>  4.0   |  20<L>  |  1.07    Ca    8.6      03 Aug 2018 02:28  Phos  3.6     08-03  Mg     2.0     08-03    TPro  7.1  /  Alb  3.9  /  TBili  0.7  /  DBili  x   /  AST  26  /  ALT  19  /  AlkPhos  66  08-03    LIVER FUNCTIONS - ( 03 Aug 2018 02:28 )  Alb: 3.9 g/dL / Pro: 7.1 g/dL / ALK PHOS: 66 U/L / ALT: 19 U/L / AST: 26 U/L / GGT: x           PT/INR - ( 03 Aug 2018 02:28 )   PT: 14.2 sec;   INR: 1.31 ratio         PTT - ( 03 Aug 2018 02:28 )  PTT:>200.0 sec  CKMB Units: 8.8 ng/mL (08-03 @ 02:28)  Creatine Kinase, Serum: 213 U/L (08-03 @ 02:28)  Creatine Kinase, Serum: 261 U/L (08-02 @ 19:46)  CKMB Units: 8.8 ng/mL (08-02 @ 19:46)  Creatine Kinase, Serum: 290 U/L (08-02 @ 13:52)  CKMB Units: 5.1 ng/mL (08-02 @ 13:52)    CARDIAC MARKERS ( 03 Aug 2018 02:28 )  x     / x     / 213 U/L / x     / 8.8 ng/mL  CARDIAC MARKERS ( 02 Aug 2018 19:46 )  x     / x     / 261 U/L / x     / 8.8 ng/mL  CARDIAC MARKERS ( 02 Aug 2018 13:52 )  x     / x     / 290 U/L / x     / 5.1 ng/mL

## 2018-08-04 LAB
ALBUMIN SERPL ELPH-MCNC: 3.5 G/DL — SIGNIFICANT CHANGE UP (ref 3.3–5)
ALP SERPL-CCNC: 67 U/L — SIGNIFICANT CHANGE UP (ref 40–120)
ALT FLD-CCNC: 19 U/L — SIGNIFICANT CHANGE UP (ref 10–45)
ANION GAP SERPL CALC-SCNC: 10 MMOL/L — SIGNIFICANT CHANGE UP (ref 5–17)
ANION GAP SERPL CALC-SCNC: 10 MMOL/L — SIGNIFICANT CHANGE UP (ref 5–17)
APTT BLD: 142.5 SEC — CRITICAL HIGH (ref 27.5–37.4)
APTT BLD: 49.1 SEC — HIGH (ref 27.5–37.4)
APTT BLD: 62.7 SEC — HIGH (ref 27.5–37.4)
AST SERPL-CCNC: 23 U/L — SIGNIFICANT CHANGE UP (ref 10–40)
BILIRUB SERPL-MCNC: 0.7 MG/DL — SIGNIFICANT CHANGE UP (ref 0.2–1.2)
BUN SERPL-MCNC: 14 MG/DL — SIGNIFICANT CHANGE UP (ref 7–23)
BUN SERPL-MCNC: 16 MG/DL — SIGNIFICANT CHANGE UP (ref 7–23)
CALCIUM SERPL-MCNC: 8.5 MG/DL — SIGNIFICANT CHANGE UP (ref 8.4–10.5)
CALCIUM SERPL-MCNC: 9 MG/DL — SIGNIFICANT CHANGE UP (ref 8.4–10.5)
CHLORIDE SERPL-SCNC: 100 MMOL/L — SIGNIFICANT CHANGE UP (ref 96–108)
CHLORIDE SERPL-SCNC: 99 MMOL/L — SIGNIFICANT CHANGE UP (ref 96–108)
CO2 SERPL-SCNC: 20 MMOL/L — LOW (ref 22–31)
CO2 SERPL-SCNC: 22 MMOL/L — SIGNIFICANT CHANGE UP (ref 22–31)
CREAT ?TM UR-MCNC: 56 MG/DL — SIGNIFICANT CHANGE UP
CREAT SERPL-MCNC: 0.99 MG/DL — SIGNIFICANT CHANGE UP (ref 0.5–1.3)
CREAT SERPL-MCNC: 1.13 MG/DL — SIGNIFICANT CHANGE UP (ref 0.5–1.3)
GLUCOSE SERPL-MCNC: 63 MG/DL — LOW (ref 70–99)
GLUCOSE SERPL-MCNC: 98 MG/DL — SIGNIFICANT CHANGE UP (ref 70–99)
HCT VFR BLD CALC: 39.2 % — SIGNIFICANT CHANGE UP (ref 39–50)
HGB BLD-MCNC: 13.6 G/DL — SIGNIFICANT CHANGE UP (ref 13–17)
INR BLD: 1.23 RATIO — HIGH (ref 0.88–1.16)
MAGNESIUM SERPL-MCNC: 1.8 MG/DL — SIGNIFICANT CHANGE UP (ref 1.6–2.6)
MAGNESIUM SERPL-MCNC: 2.2 MG/DL — SIGNIFICANT CHANGE UP (ref 1.6–2.6)
MCHC RBC-ENTMCNC: 32.4 PG — SIGNIFICANT CHANGE UP (ref 27–34)
MCHC RBC-ENTMCNC: 34.6 GM/DL — SIGNIFICANT CHANGE UP (ref 32–36)
MCV RBC AUTO: 93.5 FL — SIGNIFICANT CHANGE UP (ref 80–100)
OSMOLALITY SERPL: 283 MOS/KG — SIGNIFICANT CHANGE UP (ref 275–300)
OSMOLALITY UR: 302 MOS/KG — SIGNIFICANT CHANGE UP (ref 300–900)
PHOSPHATE SERPL-MCNC: 3.7 MG/DL — SIGNIFICANT CHANGE UP (ref 2.5–4.5)
PHOSPHATE SERPL-MCNC: 3.9 MG/DL — SIGNIFICANT CHANGE UP (ref 2.5–4.5)
PLATELET # BLD AUTO: 145 K/UL — LOW (ref 150–400)
POTASSIUM SERPL-MCNC: 3.7 MMOL/L — SIGNIFICANT CHANGE UP (ref 3.5–5.3)
POTASSIUM SERPL-MCNC: 4.3 MMOL/L — SIGNIFICANT CHANGE UP (ref 3.5–5.3)
POTASSIUM SERPL-SCNC: 3.7 MMOL/L — SIGNIFICANT CHANGE UP (ref 3.5–5.3)
POTASSIUM SERPL-SCNC: 4.3 MMOL/L — SIGNIFICANT CHANGE UP (ref 3.5–5.3)
PROT SERPL-MCNC: 6.9 G/DL — SIGNIFICANT CHANGE UP (ref 6–8.3)
PROTHROM AB SERPL-ACNC: 13.3 SEC — HIGH (ref 9.8–12.7)
RBC # BLD: 4.19 M/UL — LOW (ref 4.2–5.8)
RBC # FLD: 12.3 % — SIGNIFICANT CHANGE UP (ref 10.3–14.5)
SODIUM SERPL-SCNC: 130 MMOL/L — LOW (ref 135–145)
SODIUM SERPL-SCNC: 131 MMOL/L — LOW (ref 135–145)
SODIUM UR-SCNC: 39 MMOL/L — SIGNIFICANT CHANGE UP
WBC # BLD: 6.5 K/UL — SIGNIFICANT CHANGE UP (ref 3.8–10.5)
WBC # FLD AUTO: 6.5 K/UL — SIGNIFICANT CHANGE UP (ref 3.8–10.5)

## 2018-08-04 PROCEDURE — 93010 ELECTROCARDIOGRAM REPORT: CPT

## 2018-08-04 PROCEDURE — 99233 SBSQ HOSP IP/OBS HIGH 50: CPT | Mod: GC

## 2018-08-04 RX ORDER — LISINOPRIL 2.5 MG/1
5 TABLET ORAL DAILY
Qty: 0 | Refills: 0 | Status: DISCONTINUED | OUTPATIENT
Start: 2018-08-04 | End: 2018-08-07

## 2018-08-04 RX ORDER — MAGNESIUM SULFATE 500 MG/ML
2 VIAL (ML) INJECTION ONCE
Qty: 0 | Refills: 0 | Status: COMPLETED | OUTPATIENT
Start: 2018-08-04 | End: 2018-08-04

## 2018-08-04 RX ORDER — WARFARIN SODIUM 2.5 MG/1
7.5 TABLET ORAL ONCE
Qty: 0 | Refills: 0 | Status: COMPLETED | OUTPATIENT
Start: 2018-08-04 | End: 2018-08-04

## 2018-08-04 RX ORDER — POTASSIUM CHLORIDE 20 MEQ
40 PACKET (EA) ORAL ONCE
Qty: 0 | Refills: 0 | Status: COMPLETED | OUTPATIENT
Start: 2018-08-04 | End: 2018-08-04

## 2018-08-04 RX ADMIN — LISINOPRIL 5 MILLIGRAM(S): 2.5 TABLET ORAL at 05:35

## 2018-08-04 RX ADMIN — HEPARIN SODIUM 800 UNIT(S)/HR: 5000 INJECTION INTRAVENOUS; SUBCUTANEOUS at 21:56

## 2018-08-04 RX ADMIN — Medication 50 GRAM(S): at 06:40

## 2018-08-04 RX ADMIN — HEPARIN SODIUM 600 UNIT(S)/HR: 5000 INJECTION INTRAVENOUS; SUBCUTANEOUS at 07:02

## 2018-08-04 RX ADMIN — WARFARIN SODIUM 7.5 MILLIGRAM(S): 2.5 TABLET ORAL at 21:01

## 2018-08-04 RX ADMIN — Medication 40 MILLIEQUIVALENT(S): at 15:25

## 2018-08-04 RX ADMIN — Medication 25 MILLIGRAM(S): at 18:16

## 2018-08-04 RX ADMIN — Medication 81 MILLIGRAM(S): at 12:54

## 2018-08-04 RX ADMIN — CLOPIDOGREL BISULFATE 75 MILLIGRAM(S): 75 TABLET, FILM COATED ORAL at 12:54

## 2018-08-04 RX ADMIN — HEPARIN SODIUM 900 UNIT(S)/HR: 5000 INJECTION INTRAVENOUS; SUBCUTANEOUS at 01:37

## 2018-08-04 RX ADMIN — HEPARIN SODIUM 600 UNIT(S)/HR: 5000 INJECTION INTRAVENOUS; SUBCUTANEOUS at 14:09

## 2018-08-04 RX ADMIN — ATORVASTATIN CALCIUM 80 MILLIGRAM(S): 80 TABLET, FILM COATED ORAL at 21:02

## 2018-08-04 RX ADMIN — Medication 25 MILLIGRAM(S): at 05:35

## 2018-08-04 RX ADMIN — HEPARIN SODIUM 0 UNIT(S)/HR: 5000 INJECTION INTRAVENOUS; SUBCUTANEOUS at 06:01

## 2018-08-04 NOTE — CHART NOTE - NSCHARTNOTEFT_GEN_A_CORE
====================  CCU MIDNIGHT ROUNDS  ====================    JIMMIE SANCHEZ  [id number]    ====================  SUMMARY:  ====================   HPI:  CHIEF COMPLAINT: Chest Pain  77 year old German male with a history of HTN, HLD presented from outside hospital for chest pain. At around 3 am on 8/2/2018 patient began experiencing severe left sided chest pain and tightness radiating to the left shoulder. Pain was described as 10/10 in intensity, unable to describe quality. There was no other associated symptoms such as SOB, or diaphoresis. He reported relief of symptoms shortly after taking nitroglycerin and isordil. He remained without pain after the episode and was subsequently taken to the [facility] ED by his daughter at around 9:00 am. At the ED in [place] he was found to have an MI with EKG showing STEMI with ST elevations in II, III and AVF.  Pt had /85, RR 18, HR 67. SPO2 99% on RA. He was given Plavix 600mg and Aspirin 325, Lopressor 25 and Heparin 5000 U. He was then transferred to [facility] for higher level of care.   Upon arrival to [facility], pt was taken to the cath lab where Hrt cath showed mLAD with 99% stenosis, LCircumflex ?80% and RPDA 99%, with 2 stents to the mLAD and 1 to the pLAD. He was then transferred to the CCU for further management with plan for staging tomorrow.   Of note, patient had similar episode in May, and was hospitalized for 11 days in [facility]. Otherwise has had history of CP pain with significant exertion, usually relieved with rest. (02 Aug 2018 14:35)      ====================  NEW EVENTS:  ====================  S/p staged PCI to a 80% LCx lesion – s/p YECENIA x 1      ====================  VITALS (Last 12 hrs):  ====================    T(C): 36.6 (08-03-18 @ 19:00), Max: 36.7 (08-03-18 @ 16:40)  HR: 74 (08-03-18 @ 23:00) (58 - 74)  BP: 141/79 (08-03-18 @ 23:00) (99/63 - 156/83)  BP(mean): 113 (08-03-18 @ 23:00) (74 - 113)  ABP: --  ABP(mean): --  RR: 20 (08-03-18 @ 21:00) (14 - 24)  SpO2: 96% (08-03-18 @ 23:00) (95% - 99%)    TELEMETRY:        *BLOOD GAS/ARTERIAL/MIXED/VENOUS  *LACTATE    I&O's Summary    02 Aug 2018 07:01  -  03 Aug 2018 07:00  --------------------------------------------------------  IN: 723 mL / OUT: 1725 mL / NET: -1002 mL    03 Aug 2018 07:01  -  04 Aug 2018 00:24  --------------------------------------------------------  IN: 606 mL / OUT: 450 mL / NET: 156 mL        ====================  PLAN:  77 year old German male with past medical history of HTN and HLD, presenting from outside ED with chest pain and EKG consistent with a STEMI (ST elevation in II, III and AVF), given , Plavix 600, Lopressor 25 and Heparin 5000 U at OSH. s/p cath (radial access), found to have mLAD, circumflex and RPDA stenosis, with 2 stents to the mLAD and 1 to pLAD, s/p staged PCI to 80% LCx lesion s/p YECENIA X1, now with apical thrombus  -C/w ASA, Plavix and lipitor  -C/w Heparin gtt for apical thrombus  -C/w metoprolol 25mg BID, uptitrate as tolerate  -Will start ACE  ====================        He Segundo - PGY2  Internal Mecidine - CCU Resident ====================  CCU MIDNIGHT ROUNDS  ====================    JIMMIE SANCHEZ  [id number]    ====================  SUMMARY:  ====================   HPI:  CHIEF COMPLAINT: Chest Pain  77 year old [place] male with a history of HTN, HLD presented from outside hospital for chest pain. At around 3 am on 8/2/2018 patient began experiencing severe left sided chest pain and tightness radiating to the left shoulder. Pain was described as 10/10 in intensity, unable to describe quality. There was no other associated symptoms such as SOB, or diaphoresis. He reported relief of symptoms shortly after taking nitroglycerin and isordil. He remained without pain after the episode and was subsequently taken to the [facility] ED by his daughter at around 9:00 am. At the ED in [place] he was found to have an MI with EKG showing STEMI with ST elevations in II, III and AVF.  Pt had /85, RR 18, HR 67. SPO2 99% on RA. He was given Plavix 600mg and Aspirin 325, Lopressor 25 and Heparin 5000 U. He was then transferred to [facility] for higher level of care.   Upon arrival to [facility], pt was taken to the cath lab where Hrt cath showed mLAD with 99% stenosis, LCircumflex ?80% and RPDA 99%, with 2 stents to the mLAD and 1 to the pLAD. He was then transferred to the CCU for further management with plan for staging tomorrow.   Of note, patient had similar episode in May, and was hospitalized for 11 days in [facility]. Otherwise has had history of CP pain with significant exertion, usually relieved with rest. (02 Aug 2018 14:35)      ====================  NEW EVENTS:  ====================  S/p staged PCI to a 80% LCx lesion – s/p YECENIA x 1      ====================  VITALS (Last 12 hrs):  ====================    T(C): 36.6 (08-03-18 @ 19:00), Max: 36.7 (08-03-18 @ 16:40)  HR: 74 (08-03-18 @ 23:00) (58 - 74)  BP: 141/79 (08-03-18 @ 23:00) (99/63 - 156/83)  BP(mean): 113 (08-03-18 @ 23:00) (74 - 113)  ABP: --  ABP(mean): --  RR: 20 (08-03-18 @ 21:00) (14 - 24)  SpO2: 96% (08-03-18 @ 23:00) (95% - 99%)    TELEMETRY:        *BLOOD GAS/ARTERIAL/MIXED/VENOUS  *LACTATE    I&O's Summary    02 Aug 2018 07:01  -  03 Aug 2018 07:00  --------------------------------------------------------  IN: 723 mL / OUT: 1725 mL / NET: -1002 mL    03 Aug 2018 07:01  -  04 Aug 2018 00:24  --------------------------------------------------------  IN: 606 mL / OUT: 450 mL / NET: 156 mL        ====================  PLAN:  77 year old [place] male with past medical history of HTN and HLD, presenting from outside ED with chest pain and EKG consistent with a STEMI (ST elevation in II, III and AVF), given , Plavix 600, Lopressor 25 and Heparin 5000 U at OSH. s/p cath (radial access), found to have mLAD, circumflex and RPDA stenosis, with 2 stents to the mLAD and 1 to pLAD, s/p staged PCI to 80% LCx lesion s/p YECENIA X1, now with apical thrombus  -C/w ASA, Plavix and lipitor  -C/w Heparin gtt for apical thrombus  -C/w metoprolol 25mg BID, uptitrate as tolerate  ====================        He Segundo - PGY2  Internal Mecidine - CCU Resident No

## 2018-08-04 NOTE — PROGRESS NOTE ADULT - SUBJECTIVE AND OBJECTIVE BOX
PATIENT:  JIMMIE SANCHEZ  [id number]    CHIEF COMPLAINT:  Patient is a 77y old  Male who presents with a chief complaint of Chest Pain (02 Aug 2018 14:35)      INTERVAL HISTORYOVERNIGHT EVENTS:      REVIEW OF SYSTEMS:    Constitutional:     [ ] negative [ ] fevers [ ] chills [ ] weight loss [ ] weight gain  HEENT:                  [ ] negative [ ] dry eyes [ ] eye irritation [ ] postnasal drip [ ] nasal congestion  CV:                         [ ] negative  [ ] chest pain [ ] orthopnea [ ] palpitations [ ] murmur  Resp:                     [ ] negative [ ] cough [ ] shortness of breath [ ] dyspnea [ ] wheezing [ ] sputum [ ] hemoptysis  GI:                          [ ] negative [ ] nausea [ ] vomiting [ ] diarrhea [ ] constipation [ ] abd pain [ ] dysphagia   :                        [ ] negative [ ] dysuria [ ] nocturia [ ] hematuria [ ] increased urinary frequency  Musculoskeletal: [ ] negative [ ] back pain [ ] myalgias [ ] arthralgias [ ] fracture  Skin:                       [ ] negative [ ] rash [ ] itch  Neurological:        [ ] negative [ ] headache [ ] dizziness [ ] syncope [ ] weakness [ ] numbness  Psychiatric:           [ ] negative [ ] anxiety [ ] depression  Endocrine:            [ ] negative [ ] diabetes [ ] thyroid problem  Heme/Lymph:      [ ] negative [ ] anemia [ ] bleeding problem  Allergic/Immune: [ ] negative [ ] itchy eyes [ ] nasal discharge [ ] hives [ ] angioedema    [ ] All other systems negative  [ ] Unable to assess ROS because ________.    MEDICATIONS:  MEDICATIONS  (STANDING):  aspirin  chewable 81 milliGRAM(s) Oral daily  atorvastatin 80 milliGRAM(s) Oral at bedtime  clopidogrel Tablet 75 milliGRAM(s) Oral daily  heparin  Infusion. 1200 Unit(s)/Hr (12 mL/Hr) IV Continuous <Continuous>  lisinopril 5 milliGRAM(s) Oral daily  metoprolol tartrate 25 milliGRAM(s) Oral two times a day    MEDICATIONS  (PRN):  heparin  Injectable 6500 Unit(s) IV Push every 6 hours PRN For aPTT less than 40  heparin  Injectable 3000 Unit(s) IV Push every 6 hours PRN For aPTT between 40 - 57      ALLERGIES:  Allergies    No Known Allergies    Intolerances        OBJECTIVE:  ICU Vital Signs Last 24 Hrs  T(C): 36.7 (04 Aug 2018 04:00), Max: 36.8 (03 Aug 2018 08:00)  T(F): 98 (04 Aug 2018 04:00), Max: 98.2 (03 Aug 2018 08:00)  HR: 54 (04 Aug 2018 06:00) (54 - 78)  BP: 119/69 (04 Aug 2018 06:00) (99/63 - 156/83)  BP(mean): 93 (04 Aug 2018 06:00) (74 - 119)  ABP: --  ABP(mean): --  RR: 22 (04 Aug 2018 06:00) (13 - 24)  SpO2: 98% (04 Aug 2018 06:00) (93% - 99%)      Adult Advanced Hemodynamics Last 24 Hrs  CVP(mm Hg): --  CVP(cm H2O): --  CO: --  CI: --  PA: --  PA(mean): --  PCWP: --  SVR: --  SVRI: --  PVR: --  PVRI: --  CAPILLARY BLOOD GLUCOSE        CAPILLARY BLOOD GLUCOSE        I&O's Summary    02 Aug 2018 07:01  -  03 Aug 2018 07:00  --------------------------------------------------------  IN: 723 mL / OUT: 1725 mL / NET: -1002 mL    03 Aug 2018 07:01  -  04 Aug 2018 06:54  --------------------------------------------------------  IN: 1327 mL / OUT: 1500 mL / NET: -173 mL      Daily     Daily Weight in k.3 (04 Aug 2018 05:00)    PHYSICAL EXAMINATION:  General: WN/WD NAD  HEENT: PERRLA, EOMI, moist mucous membranes  Neurology: A&Ox3, nonfocal, HARLEY x 4  Respiratory: CTA B/L, normal respiratory effort, no wheezes, crackles, rales  CV: RRR, S1S2, no murmurs, rubs or gallops  Abdominal: Soft, NT, ND +BS, Last BM  Extremities: No edema, + peripheral pulses  Incisions:   Tubes:    LABS:                          13.6   6.5   )-----------( 145      ( 04 Aug 2018 05:26 )             39.2     08-04    130<L>  |  100  |  16  ----------------------------<  98  4.3   |  20<L>  |  1.13    Ca    8.5      04 Aug 2018 05:26  Phos  3.9     08-04  Mg     1.8     08-04    TPro  6.9  /  Alb  3.5  /  TBili  0.7  /  DBili  x   /  AST  23  /  ALT  19  /  AlkPhos  67  08-04    LIVER FUNCTIONS - ( 04 Aug 2018 05:26 )  Alb: 3.5 g/dL / Pro: 6.9 g/dL / ALK PHOS: 67 U/L / ALT: 19 U/L / AST: 23 U/L / GGT: x           PT/INR - ( 04 Aug 2018 05:26 )   PT: 13.3 sec;   INR: 1.23 ratio         PTT - ( 04 Aug 2018 05:26 )  PTT:142.5 sec    CARDIAC MARKERS ( 03 Aug 2018 02:28 )  x     / x     / 213 U/L / x     / 8.8 ng/mL  CARDIAC MARKERS ( 02 Aug 2018 19:46 )  x     / x     / 261 U/L / x     / 8.8 ng/mL  CARDIAC MARKERS ( 02 Aug 2018 13:52 )  x     / x     / 290 U/L / x     / 5.1 ng/mL          TELEMETRY:     EKG:     IMAGING: PATIENT:  JIMMIE SANCHEZ  [id number]    CHIEF COMPLAINT:  Patient is a 77y old  Male who presents with a chief complaint of Chest Pain (02 Aug 2018 14:35)      INTERVAL HISTORYOVERNIGHT EVENTS:  Complained of left arm pain resolved, now with left arm stiffness, likely due to sleeping position as opposed to cardiac etiology     REVIEW OF SYSTEMS:    Constitutional:     [ ] negative [ ] fevers [ ] chills [ ] weight loss [ ] weight gain  HEENT:                  [ ] negative [ ] dry eyes [ ] eye irritation [ ] postnasal drip [ ] nasal congestion  CV:                         [ ] negative  [ ] chest pain [ ] orthopnea [ ] palpitations [ ] murmur  Resp:                     [ ] negative [ ] cough [ ] shortness of breath [ ] dyspnea [ ] wheezing [ ] sputum [ ] hemoptysis  GI:                          [ ] negative [ ] nausea [ ] vomiting [ ] diarrhea [ ] constipation [ ] abd pain [ ] dysphagia   :                        [ ] negative [ ] dysuria [ ] nocturia [ ] hematuria [ ] increased urinary frequency  Musculoskeletal: [ ] negative [ ] back pain [ ] myalgias [ ] arthralgias [ ] fracture  Skin:                       [ ] negative [ ] rash [ ] itch  Neurological:        [ ] negative [ ] headache [ ] dizziness [ ] syncope [ ] weakness [ ] numbness  Psychiatric:           [ ] negative [ ] anxiety [ ] depression  Endocrine:            [ ] negative [ ] diabetes [ ] thyroid problem  Heme/Lymph:      [ ] negative [ ] anemia [ ] bleeding problem  Allergic/Immune: [ ] negative [ ] itchy eyes [ ] nasal discharge [ ] hives [ ] angioedema    [x] All other systems negative      MEDICATIONS:  MEDICATIONS  (STANDING):  aspirin  chewable 81 milliGRAM(s) Oral daily  atorvastatin 80 milliGRAM(s) Oral at bedtime  clopidogrel Tablet 75 milliGRAM(s) Oral daily  heparin  Infusion. 1200 Unit(s)/Hr (12 mL/Hr) IV Continuous <Continuous>  lisinopril 5 milliGRAM(s) Oral daily  metoprolol tartrate 25 milliGRAM(s) Oral two times a day    MEDICATIONS  (PRN):  heparin  Injectable 6500 Unit(s) IV Push every 6 hours PRN For aPTT less than 40  heparin  Injectable 3000 Unit(s) IV Push every 6 hours PRN For aPTT between 40 - 57      ALLERGIES:  Allergies    No Known Allergies    Intolerances        OBJECTIVE:  ICU Vital Signs Last 24 Hrs  T(C): 36.7 (04 Aug 2018 04:00), Max: 36.8 (03 Aug 2018 08:00)  T(F): 98 (04 Aug 2018 04:00), Max: 98.2 (03 Aug 2018 08:00)  HR: 54 (04 Aug 2018 06:00) (54 - 78)  BP: 119/69 (04 Aug 2018 06:00) (99/63 - 156/83)  BP(mean): 93 (04 Aug 2018 06:00) (74 - 119)  ABP: --  ABP(mean): --  RR: 22 (04 Aug 2018 06:00) (13 - 24)  SpO2: 98% (04 Aug 2018 06:00) (93% - 99%)            CAPILLARY BLOOD GLUCOSE        I&O's Summary    02 Aug 2018 07:01  -  03 Aug 2018 07:00  --------------------------------------------------------  IN: 723 mL / OUT: 1725 mL / NET: -1002 mL    03 Aug 2018 07:01  -  04 Aug 2018 06:54  --------------------------------------------------------  IN: 1327 mL / OUT: 1500 mL / NET: -173 mL      Daily     Daily Weight in k.3 (04 Aug 2018 05:00)    PHYSICAL EXAMINATION:  General: WN/WD NAD  HEENT: PERRLA, EOMI, moist mucous membranes  Neurology: A&Ox3, nonfocal, HARLEY x 4  Respiratory: CTA B/L, normal respiratory effort, no wheezes, crackles, rales  CV: RRR, S1S2, no murmurs,  Abdominal: Soft, NT, ND +BS, Last BM  Extremities: No edema,         LABS:                          13.6   6.5   )-----------( 145      ( 04 Aug 2018 05:26 )             39.2     08-04    130<L>  |  100  |  16  ----------------------------<  98  4.3   |  20<L>  |  1.13    Ca    8.5      04 Aug 2018 05:26  Phos  3.9     08-  Mg     1.8     -    TPro  6.9  /  Alb  3.5  /  TBili  0.7  /  DBili  x   /  AST  23  /  ALT  19  /  AlkPhos  67  08-04    LIVER FUNCTIONS - ( 04 Aug 2018 05:26 )  Alb: 3.5 g/dL / Pro: 6.9 g/dL / ALK PHOS: 67 U/L / ALT: 19 U/L / AST: 23 U/L / GGT: x           PT/INR - ( 04 Aug 2018 05:26 )   PT: 13.3 sec;   INR: 1.23 ratio         PTT - ( 04 Aug 2018 05:26 )  PTT:142.5 sec    CARDIAC MARKERS ( 03 Aug 2018 02:28 )  x     / x     / 213 U/L / x     / 8.8 ng/mL  CARDIAC MARKERS ( 02 Aug 2018 19:46 )  x     / x     / 261 U/L / x     / 8.8 ng/mL  CARDIAC MARKERS ( 02 Aug 2018 13:52 )  x     / x     / 290 U/L / x     / 5.1 ng/mL

## 2018-08-04 NOTE — CHART NOTE - NSCHARTNOTEFT_GEN_A_CORE
====================  CCU MIDNIGHT ROUNDS  ====================    JIMMIE SANCHEZ  [id number]    ====================  SUMMARY: HPI:  CHIEF COMPLAINT: Chest Pain    77 year old [place] male with a history of HTN, HLD presented from outside hospital for chest pain. At around 3 am on 8/2/2018 patient began experiencing severe left sided chest pain and tightness radiating to the left shoulder. Pain was described as 10/10 in intensity, unable to describe quality. There was no other associated symptoms such as SOB, or diaphoresis. He reported relief of symptoms shortly after taking nitroglycerin and isordil. He remained without pain after the episode and was subsequently taken to the [facility] ED by his daughter at around 9:00 am. At the ED in [place] he was found to have an MI with EKG showing STEMI with ST elevations in II, III and AVF.  Pt had /85, RR 18, HR 67. SPO2 99% on RA. He was given Plavix 600mg and Aspirin 325, Lopressor 25 and Heparin 5000 U. He was then transferred to [facility] for higher level of care.     Upon arrival to [facility], pt was taken to the cath lab where Hrt cath showed mLAD with 99% stenosis, LCircumflex ?80% and RPDA 99%, with 2 stents to the mLAD and 1 to the pLAD. He was then transferred to the CCU for further management with plan for staging tomorrow.     Of note, patient had similar episode in May, and was hospitalized for 11 days in [facility]. Otherwise has had history of CP pain with significant exertion, usually relieved with rest. (02 Aug 2018 14:35)    ====================        ====================  NEW EVENTS:  ====================        ====================  VITALS (Last 12 hrs):  ====================    T(C): 36.7 (08-04-18 @ 19:00), Max: 36.8 (08-04-18 @ 17:00)  HR: 66 (08-04-18 @ 21:00) (60 - 88)  BP: 123/76 (08-04-18 @ 21:00) (109/70 - 149/80)  BP(mean): 92 (08-04-18 @ 21:00) (78 - 115)  ABP: --  ABP(mean): --  RR: 19 (08-04-18 @ 19:00) (14 - 19)  SpO2: 99% (08-04-18 @ 21:00) (95% - 99%)  Wt(kg): --  CVP(mm Hg): --  CO: --  CI: --  PA: --  PA(mean): --  PA(direct): --  PCWP: --  SVR: --    TELEMETRY:        *BLOOD GAS/ARTERIAL/MIXED/VENOUS  *LACTATE    I&O's Summary    03 Aug 2018 07:01  -  04 Aug 2018 07:00  --------------------------------------------------------  IN: 1327 mL / OUT: 1500 mL / NET: -173 mL    04 Aug 2018 07:01  -  04 Aug 2018 21:43  --------------------------------------------------------  IN: 638 mL / OUT: 1325 mL / NET: -687 mL        ====================  PLAN:  Patient remains stable. Pt with inferior wall STEMI c/b LV thrombus with PCI to mLAD x 1 and pLAD. Patient remains on ASA, Plavix, Lipitor , lopresssor 25 mg BID, and Lisinopril. He is on heparin gtt with bridge to coumadin with IINR goal 2-3. Patient continues to be hyponatremic with serum Na 131; urine Na 39 and urine osmolality 200. Urine studies most consistent with SIADH. Will fluid restrict and continue to trend BMP.   ====================    HEALTH ISSUES - PROBLEM Dx:        HEALTH ISSUES - R/O PROBLEM Dx:

## 2018-08-04 NOTE — CHART NOTE - NSCHARTNOTEFT_GEN_A_CORE
CCU Fellow Addendum:     77 year old [place] male with past medical history of HTN and HLD, presenting from outside ED with chest pain and EKG consistent with a STEMI (ST elevation in II, III and AVF), given , Plavix 600, Lopressor 25 and Heparin 5000 U at OSH. s/p cath (radial access), found to have mLAD, circumflex and RPDA stenosis s/p multiple stents.  Mild LV Dysfunction w/ apical thrombus.        RECS  - cont ASA, Plavix , statin, Lopressor 25 mg BID   - hep GTT w/ bridge to coumadin   - continue Lisinopril 5 mg daily   - trend Na, obtain urine studies. follow up Na levels q6-q8 hr    Je Flores MD  Cardiology Fellow  x [phone]

## 2018-08-04 NOTE — PROGRESS NOTE ADULT - ASSESSMENT
77 year old [place]ien male with past medical history of HTN and HLD, presenting from outside ED with chest pain and EKG consistent with a STEMI (ST elevation in II, III and AVF), given , Plavix 600, Lopressor 25 and Heparin 5000 U at OSH. 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 EF 40% and LVEDP 14    Neuro   No active issues    Pulmn  No active issues     Cards  STEMI (II, III, AVF) s/p cath (8/2/2018); mLAD with 99% stenosis, LCircumflex ?80% and RPDA 99%, with 2 stents to the mLAD and 1 to the pLAD.  HTN  Aspirin 81, Plavix 75 and Lipitor 80  Hold home Atenolol, Captopril and Isordil  If BP stable and pt not with signs/symptoms of heart failure, may consider starting Lopressor   F/U TTE   Heparin and Coumadin; apical thrombus  Start Lopressor 25 BID     Renal  No acute issues    Heme   No acute issues        Antonia Randall PGY1  Internal Medicine 77 year old [place] male with past medical history of HTN and HLD, presenting from outside ED with chest pain and EKG consistent with a STEMI (ST elevation in II, III and AVF), given , Plavix 600, Lopressor 25 and Heparin 5000 U at OSH. 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 EF 40% and LVEDP 14    Neuro   No active issues    Pulmn  No active issues     Cards  STEMI (II, III, AVF) s/p cath (8/2/2018); mLAD with 99% stenosis, LCircumflex ?80% and RPDA 99%, with 2 stents to the mLAD and 1 to the pLAD.  HTN  Aspirin 81, Plavix 75 and Lipitor 80, Lisinopril 5, Lopressor 25  Hold home Atenolol, Captopril and Isordil  If BP stable and pt not with signs/symptoms of heart failure, may consider starting Lopressor   Heparin and Coumadin; apical thrombus; 7.5mg Coumadin tonight      Renal  Hyponatremia, Na trending down  -F/U repeat CBC and replete if necessary  -Monitor volume status and UOP  -F/U urine studies     Heme   No acute issues        Antonia Randall PGY1  Internal Medicine

## 2018-08-05 LAB
ALBUMIN SERPL ELPH-MCNC: 3.6 G/DL — SIGNIFICANT CHANGE UP (ref 3.3–5)
ALP SERPL-CCNC: 68 U/L — SIGNIFICANT CHANGE UP (ref 40–120)
ALT FLD-CCNC: 24 U/L — SIGNIFICANT CHANGE UP (ref 10–45)
ANION GAP SERPL CALC-SCNC: 13 MMOL/L — SIGNIFICANT CHANGE UP (ref 5–17)
APTT BLD: 94.2 SEC — HIGH (ref 27.5–37.4)
APTT BLD: 95.2 SEC — HIGH (ref 27.5–37.4)
AST SERPL-CCNC: 27 U/L — SIGNIFICANT CHANGE UP (ref 10–40)
BILIRUB SERPL-MCNC: 0.4 MG/DL — SIGNIFICANT CHANGE UP (ref 0.2–1.2)
BUN SERPL-MCNC: 18 MG/DL — SIGNIFICANT CHANGE UP (ref 7–23)
CALCIUM SERPL-MCNC: 9.2 MG/DL — SIGNIFICANT CHANGE UP (ref 8.4–10.5)
CHLORIDE SERPL-SCNC: 101 MMOL/L — SIGNIFICANT CHANGE UP (ref 96–108)
CO2 SERPL-SCNC: 20 MMOL/L — LOW (ref 22–31)
CREAT SERPL-MCNC: 1.22 MG/DL — SIGNIFICANT CHANGE UP (ref 0.5–1.3)
GLUCOSE SERPL-MCNC: 97 MG/DL — SIGNIFICANT CHANGE UP (ref 70–99)
HCT VFR BLD CALC: 39.5 % — SIGNIFICANT CHANGE UP (ref 39–50)
HGB BLD-MCNC: 13.7 G/DL — SIGNIFICANT CHANGE UP (ref 13–17)
INR BLD: 1.68 RATIO — HIGH (ref 0.88–1.16)
MAGNESIUM SERPL-MCNC: 1.9 MG/DL — SIGNIFICANT CHANGE UP (ref 1.6–2.6)
MCHC RBC-ENTMCNC: 32.2 PG — SIGNIFICANT CHANGE UP (ref 27–34)
MCHC RBC-ENTMCNC: 34.7 GM/DL — SIGNIFICANT CHANGE UP (ref 32–36)
MCV RBC AUTO: 92.8 FL — SIGNIFICANT CHANGE UP (ref 80–100)
PHOSPHATE SERPL-MCNC: 3.9 MG/DL — SIGNIFICANT CHANGE UP (ref 2.5–4.5)
PLATELET # BLD AUTO: 149 K/UL — LOW (ref 150–400)
POTASSIUM SERPL-MCNC: 4.9 MMOL/L — SIGNIFICANT CHANGE UP (ref 3.5–5.3)
POTASSIUM SERPL-SCNC: 4.9 MMOL/L — SIGNIFICANT CHANGE UP (ref 3.5–5.3)
PROT SERPL-MCNC: 7.3 G/DL — SIGNIFICANT CHANGE UP (ref 6–8.3)
PROTHROM AB SERPL-ACNC: 18.5 SEC — HIGH (ref 9.8–12.7)
RBC # BLD: 4.25 M/UL — SIGNIFICANT CHANGE UP (ref 4.2–5.8)
RBC # FLD: 11.9 % — SIGNIFICANT CHANGE UP (ref 10.3–14.5)
SODIUM SERPL-SCNC: 134 MMOL/L — LOW (ref 135–145)
WBC # BLD: 6.1 K/UL — SIGNIFICANT CHANGE UP (ref 3.8–10.5)
WBC # FLD AUTO: 6.1 K/UL — SIGNIFICANT CHANGE UP (ref 3.8–10.5)

## 2018-08-05 PROCEDURE — 93010 ELECTROCARDIOGRAM REPORT: CPT

## 2018-08-05 PROCEDURE — 99233 SBSQ HOSP IP/OBS HIGH 50: CPT | Mod: GC

## 2018-08-05 RX ORDER — METOPROLOL TARTRATE 50 MG
25 TABLET ORAL DAILY
Qty: 0 | Refills: 0 | Status: DISCONTINUED | OUTPATIENT
Start: 2018-08-05 | End: 2018-08-05

## 2018-08-05 RX ORDER — WARFARIN SODIUM 2.5 MG/1
7.5 TABLET ORAL ONCE
Qty: 0 | Refills: 0 | Status: COMPLETED | OUTPATIENT
Start: 2018-08-05 | End: 2018-08-05

## 2018-08-05 RX ORDER — MAGNESIUM SULFATE 500 MG/ML
1 VIAL (ML) INJECTION ONCE
Qty: 0 | Refills: 0 | Status: COMPLETED | OUTPATIENT
Start: 2018-08-05 | End: 2018-08-05

## 2018-08-05 RX ORDER — METOPROLOL TARTRATE 50 MG
50 TABLET ORAL DAILY
Qty: 0 | Refills: 0 | Status: DISCONTINUED | OUTPATIENT
Start: 2018-08-06 | End: 2018-08-07

## 2018-08-05 RX ADMIN — Medication 25 MILLIGRAM(S): at 22:27

## 2018-08-05 RX ADMIN — HEPARIN SODIUM 800 UNIT(S)/HR: 5000 INJECTION INTRAVENOUS; SUBCUTANEOUS at 05:12

## 2018-08-05 RX ADMIN — Medication 25 MILLIGRAM(S): at 05:41

## 2018-08-05 RX ADMIN — Medication 100 GRAM(S): at 05:45

## 2018-08-05 RX ADMIN — Medication 81 MILLIGRAM(S): at 12:07

## 2018-08-05 RX ADMIN — HEPARIN SODIUM 800 UNIT(S)/HR: 5000 INJECTION INTRAVENOUS; SUBCUTANEOUS at 12:02

## 2018-08-05 RX ADMIN — ATORVASTATIN CALCIUM 80 MILLIGRAM(S): 80 TABLET, FILM COATED ORAL at 22:26

## 2018-08-05 RX ADMIN — LISINOPRIL 5 MILLIGRAM(S): 2.5 TABLET ORAL at 05:41

## 2018-08-05 RX ADMIN — CLOPIDOGREL BISULFATE 75 MILLIGRAM(S): 75 TABLET, FILM COATED ORAL at 12:07

## 2018-08-05 RX ADMIN — WARFARIN SODIUM 7.5 MILLIGRAM(S): 2.5 TABLET ORAL at 22:27

## 2018-08-05 NOTE — PROGRESS NOTE ADULT - SUBJECTIVE AND OBJECTIVE BOX
====================  CCU MIDNIGHT ROUNDS  ====================  JIMMIE SACNHEZ  [id number]    Patient is a 77y old  Male who presents with a chief complaint of Chest Pain (02 Aug 2018 14:35)    ====================  SUMMARY:  ====================    ====================  NEW EVENTS:  ====================    MEDICATIONS  (STANDING):  aspirin  chewable 81 milliGRAM(s) Oral daily  atorvastatin 80 milliGRAM(s) Oral at bedtime  clopidogrel Tablet 75 milliGRAM(s) Oral daily  heparin  Infusion. 1200 Unit(s)/Hr (12 mL/Hr) IV Continuous <Continuous>  lisinopril 5 milliGRAM(s) Oral daily  metoprolol succinate ER 25 milliGRAM(s) Oral daily  warfarin 7.5 milliGRAM(s) Oral once    MEDICATIONS  (PRN):  heparin  Injectable 6500 Unit(s) IV Push every 6 hours PRN For aPTT less than 40  heparin  Injectable 3000 Unit(s) IV Push every 6 hours PRN For aPTT between 40 - 57    ====================  VITALS (Last 12 hrs):  ====================    T(C): 36.8 (08-05-18 @ 17:44), Max: 36.8 (08-05-18 @ 13:08)  T(F): 98.2 (08-05-18 @ 17:44), Max: 98.2 (08-05-18 @ 13:08)  HR: 82 (08-05-18 @ 19:00) (66 - 100)  BP: 132/81 (08-05-18 @ 18:09) (112/69 - 138/94)  BP(mean): 100 (08-05-18 @ 18:09) (82 - 109)  RR: 15 (08-05-18 @ 18:09) (14 - 20)  SpO2: 96% (08-05-18 @ 19:00) (96% - 96%)      I&O's Summary    04 Aug 2018 07:01  -  05 Aug 2018 07:00  --------------------------------------------------------  IN: 942 mL / OUT: 1675 mL / NET: -733 mL    05 Aug 2018 07:01  -  05 Aug 2018 19:38  --------------------------------------------------------  IN: 1008 mL / OUT: 600 mL / NET: 408 mL        ====================  NEW LABS:  ====================  08-05    134<L>  |  101  |  18  ----------------------------<  97  4.9   |  20<L>  |  1.22    Ca    9.2      05 Aug 2018 04:53  Phos  3.9     08-05  Mg     1.9     08-05    TPro  7.3  /  Alb  3.6  /  TBili  0.4  /  DBili  x   /  AST  27  /  ALT  24  /  AlkPhos  68  08-05    PT/INR - ( 05 Aug 2018 04:53 )   PT: 18.5 sec;   INR: 1.68 ratio         PTT - ( 05 Aug 2018 11:07 )  PTT:94.2 sec        ====================  PLAN:  ====================  -       Elizabeth Dhillon [facility] NP  Beeper #[id number]  Spectra # [phone] ====================  CCU MIDNIGHT ROUNDS  ====================  JIMMIE SANCHEZ  [id number]    Patient is a 77y old  Male who presents with a chief complaint of Chest Pain (02 Aug 2018 14:35)    ====================  SUMMARY: 77 year old Nepalese male with a history of HTN, HLD presented from outside hospital for chest pain. At around 3 am on 8/2/2018 patient began experiencing severe left sided chest pain and tightness radiating to the left shoulder. Pain was described as 10/10 in intensity, unable to describe quality. There was no other associated symptoms such as SOB, or diaphoresis. He reported relief of symptoms shortly after taking nitroglycerin and isordil. He remained without pain after the episode and was subsequently taken to the [facility] ED by his daughter at around 9:00 am. At the ED in [place] he was found to have an MI with EKG showing STEMI with ST elevations in II, III and AVF.  Pt had /85, RR 18, HR 67. SPO2 99% on RA. He was given Plavix 600mg and Aspirin 325, Lopressor 25 and Heparin 5000 U. He was then transferred to [facility] for higher level of care.     Upon arrival to [facility], pt was taken to the cath lab where Hrt cath showed mLAD with 99% stenosis, LCircumflex ?80% and RPDA 99%, with 2 stents to the mLAD and 1 to the pLAD. He was then transferred to the CCU for further management with plan for staging tomorrow.     Of note, patient had similar episode in May, and was hospitalized for 11 days in [facility]. Otherwise has had history of CP pain with significant exertion, usually relieved with rest. (02 Aug 2018 14:35)  ====================    ====================  NEW EVENTS: None   ====================    MEDICATIONS  (STANDING):  aspirin  chewable 81 milliGRAM(s) Oral daily  atorvastatin 80 milliGRAM(s) Oral at bedtime  clopidogrel Tablet 75 milliGRAM(s) Oral daily  heparin  Infusion. 1200 Unit(s)/Hr (12 mL/Hr) IV Continuous <Continuous>  lisinopril 5 milliGRAM(s) Oral daily  metoprolol succinate ER 25 milliGRAM(s) Oral daily  warfarin 7.5 milliGRAM(s) Oral once    MEDICATIONS  (PRN):  heparin  Injectable 6500 Unit(s) IV Push every 6 hours PRN For aPTT less than 40  heparin  Injectable 3000 Unit(s) IV Push every 6 hours PRN For aPTT between 40 - 57    ====================  VITALS (Last 12 hrs):  ====================    T(C): 36.8 (08-05-18 @ 17:44), Max: 36.8 (08-05-18 @ 13:08)  T(F): 98.2 (08-05-18 @ 17:44), Max: 98.2 (08-05-18 @ 13:08)  HR: 82 (08-05-18 @ 19:00) (66 - 100)  BP: 132/81 (08-05-18 @ 18:09) (112/69 - 138/94)  BP(mean): 100 (08-05-18 @ 18:09) (82 - 109)  RR: 15 (08-05-18 @ 18:09) (14 - 20)  SpO2: 96% (08-05-18 @ 19:00) (96% - 96%)      I&O's Summary    04 Aug 2018 07:01  -  05 Aug 2018 07:00  --------------------------------------------------------  IN: 942 mL / OUT: 1675 mL / NET: -733 mL    05 Aug 2018 07:01  -  05 Aug 2018 19:38  --------------------------------------------------------  IN: 1008 mL / OUT: 600 mL / NET: 408 mL        ====================  NEW LABS:  ====================  08-05    134<L>  |  101  |  18  ----------------------------<  97  4.9   |  20<L>  |  1.22    Ca    9.2      05 Aug 2018 04:53  Phos  3.9     08-05  Mg     1.9     08-05    TPro  7.3  /  Alb  3.6  /  TBili  0.4  /  DBili  x   /  AST  27  /  ALT  24  /  AlkPhos  68  08-05    PT/INR - ( 05 Aug 2018 04:53 )   PT: 18.5 sec;   INR: 1.68 ratio         PTT - ( 05 Aug 2018 11:07 )  PTT:94.2 sec        ====================  PLAN:  ====================  -       Elizabeth Dhillon [facility] NP  Beeper #[id number]  Spectra # [phone] ====================  CCU MIDNIGHT ROUNDS  ====================  JIMMIE SANCHEZ  [id number]    Patient is a 77y old  Male who presents with a chief complaint of Chest Pain (02 Aug 2018 14:35)    ====================  SUMMARY: 77 year old [place]ian male with a history of HTN, HLD presented from outside hospital for chest pain. At around 3 am on 8/2/2018 patient began experiencing severe left sided chest pain and tightness radiating to the left shoulder. Pain was described as 10/10 in intensity, unable to describe quality. There was no other associated symptoms such as SOB, or diaphoresis. He reported relief of symptoms shortly after taking nitroglycerin and isordil. He remained without pain after the episode and was subsequently taken to the [facility] ED by his daughter at around 9:00 am. At the ED in [place] he was found to have an MI with EKG showing STEMI with ST elevations in II, III and AVF.  Pt had /85, RR 18, HR 67. SPO2 99% on RA. He was given Plavix 600mg and Aspirin 325, Lopressor 25 and Heparin 5000 U. He was then transferred to [facility] for higher level of care.     Upon arrival to [facility], pt was taken to the cath lab where Hrt cath showed mLAD with 99% stenosis, LCircumflex ?80% and RPDA 99%, with 2 stents to the mLAD and 1 to the pLAD. He was then transferred to the CCU for further management with plan for staging tomorrow.     Of note, patient had similar episode in May, and was hospitalized for 11 days in [facility]. Otherwise has had history of CP pain with significant exertion, usually relieved with rest. 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  ====================    MEDICATIONS  (STANDING):  aspirin  chewable 81 milliGRAM(s) Oral daily  atorvastatin 80 milliGRAM(s) Oral at bedtime  clopidogrel Tablet 75 milliGRAM(s) Oral daily  heparin  Infusion. 1200 Unit(s)/Hr (12 mL/Hr) IV Continuous <Continuous>  lisinopril 5 milliGRAM(s) Oral daily  metoprolol succinate ER 25 milliGRAM(s) Oral daily  warfarin 7.5 milliGRAM(s) Oral once    MEDICATIONS  (PRN):  heparin  Injectable 6500 Unit(s) IV Push every 6 hours PRN For aPTT less than 40  heparin  Injectable 3000 Unit(s) IV Push every 6 hours PRN For aPTT between 40 - 57    ====================  VITALS (Last 12 hrs):  ====================    T(C): 36.8 (08-05-18 @ 17:44), Max: 36.8 (08-05-18 @ 13:08)  T(F): 98.2 (08-05-18 @ 17:44), Max: 98.2 (08-05-18 @ 13:08)  HR: 82 (08-05-18 @ 19:00) (66 - 100)  BP: 132/81 (08-05-18 @ 18:09) (112/69 - 138/94)  BP(mean): 100 (08-05-18 @ 18:09) (82 - 109)  RR: 15 (08-05-18 @ 18:09) (14 - 20)  SpO2: 96% (08-05-18 @ 19:00) (96% - 96%)      I&O's Summary    04 Aug 2018 07:01  -  05 Aug 2018 07:00  --------------------------------------------------------  IN: 942 mL / OUT: 1675 mL / NET: -733 mL    05 Aug 2018 07:01  -  05 Aug 2018 19:38  --------------------------------------------------------  IN: 1008 mL / OUT: 600 mL / NET: 408 mL        ====================  NEW LABS:  ====================  08-05    134<L>  |  101  |  18  ----------------------------<  97  4.9   |  20<L>  |  1.22    Ca    9.2      05 Aug 2018 04:53  Phos  3.9     08-05  Mg     1.9     08-05    TPro  7.3  /  Alb  3.6  /  TBili  0.4  /  DBili  x   /  AST  27  /  ALT  24  /  AlkPhos  68  08-05    PT/INR - ( 05 Aug 2018 04:53 )   PT: 18.5 sec;   INR: 1.68 ratio         PTT - ( 05 Aug 2018 11:07 )  PTT:94.2 sec        ====================  PLAN:  ====================  - DAPT, high dose statin,   - hep GTT w/ bridge to coumadin   - continue Lisinopril 5 mg daily   - cont fluid restriction  on coumadin for the LV thrmobus  SIADH based upon urine studies    Elizabeth Dhillon [facility] NP  Beeper #[id number]  Spectra # [phone] ====================  CCU MIDNIGHT ROUNDS  ====================  JIMMIE SANCHEZ  [id number]    Patient is a 77y old  Male who presents with a chief complaint of Chest Pain (02 Aug 2018 14:35)    ====================  SUMMARY: 77 year old Cam[place] male with a history of HTN, HLD presented from outside hospital for chest pain. At around 3 am on 8/2/2018 patient began experiencing severe left sided chest pain and tightness radiating to the left shoulder. Pain was described as 10/10 in intensity, unable to describe quality. There was no other associated symptoms such as SOB, or diaphoresis. He reported relief of symptoms shortly after taking nitroglycerin and isordil. He remained without pain after the episode and was subsequently taken to the [facility] ED by his daughter at around 9:00 am. At the ED in [place] he was found to have an MI with EKG showing STEMI with ST elevations in II, III and AVF.  Pt had /85, RR 18, HR 67. SPO2 99% on RA. He was given Plavix 600mg and Aspirin 325, Lopressor 25 and Heparin 5000 U. He was then transferred to [facility] for higher level of care.     Upon arrival to [facility], pt was taken to the cath lab where Hrt cath showed mLAD with 99% stenosis, LCircumflex ?80% and RPDA 99%, with 2 stents to the mLAD and 1 to the pLAD. He was then transferred to the CCU for further management with plan for staging tomorrow.     Of note, patient had similar episode in May, and was hospitalized for 11 days in [facility]. Otherwise has had history of CP pain with significant exertion, usually relieved with rest. 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  ====================    MEDICATIONS  (STANDING):  aspirin  chewable 81 milliGRAM(s) Oral daily  atorvastatin 80 milliGRAM(s) Oral at bedtime  clopidogrel Tablet 75 milliGRAM(s) Oral daily  heparin  Infusion. 1200 Unit(s)/Hr (12 mL/Hr) IV Continuous <Continuous>  lisinopril 5 milliGRAM(s) Oral daily  metoprolol succinate ER 25 milliGRAM(s) Oral daily  warfarin 7.5 milliGRAM(s) Oral once    MEDICATIONS  (PRN):  heparin  Injectable 6500 Unit(s) IV Push every 6 hours PRN For aPTT less than 40  heparin  Injectable 3000 Unit(s) IV Push every 6 hours PRN For aPTT between 40 - 57    ====================  VITALS (Last 12 hrs):  ====================    T(C): 36.8 (08-05-18 @ 17:44), Max: 36.8 (08-05-18 @ 13:08)  T(F): 98.2 (08-05-18 @ 17:44), Max: 98.2 (08-05-18 @ 13:08)  HR: 82 (08-05-18 @ 19:00) (66 - 100)  BP: 132/81 (08-05-18 @ 18:09) (112/69 - 138/94)  BP(mean): 100 (08-05-18 @ 18:09) (82 - 109)  RR: 15 (08-05-18 @ 18:09) (14 - 20)  SpO2: 96% (08-05-18 @ 19:00) (96% - 96%)      I&O's Summary    04 Aug 2018 07:01  -  05 Aug 2018 07:00  --------------------------------------------------------  IN: 942 mL / OUT: 1675 mL / NET: -733 mL    05 Aug 2018 07:01  -  05 Aug 2018 19:38  --------------------------------------------------------  IN: 1008 mL / OUT: 600 mL / NET: 408 mL        ====================  NEW LABS:  ====================  08-05    134<L>  |  101  |  18  ----------------------------<  97  4.9   |  20<L>  |  1.22    Ca    9.2      05 Aug 2018 04:53  Phos  3.9     08-05  Mg     1.9     08-05    TPro  7.3  /  Alb  3.6  /  TBili  0.4  /  DBili  x   /  AST  27  /  ALT  24  /  AlkPhos  68  08-05    PT/INR - ( 05 Aug 2018 04:53 )   PT: 18.5 sec;   INR: 1.68 ratio         PTT - ( 05 Aug 2018 11:07 )  PTT:94.2 sec        ====================  PLAN:  ====================  - DAPT, high dose statin, Beta blocker, ACE  - Hep GTT w/ bridge to coumadin (LV thrombus 10mg tonight   - SIADH based upon urine studies, 1L fluid restriction, trend Na  - Trend coags     Elizabeth Dhillon U NP  Beeper #[id number]  Spectra # [phone] ====================  CCU MIDNIGHT ROUNDS  ====================  JIMMIE SANCHEZ  [id number]    Patient is a 77y old  Male who presents with a chief complaint of Chest Pain (02 Aug 2018 14:35)    ====================  SUMMARY: 77 year old [place]alan male with a history of HTN, HLD presented from outside hospital for chest pain. At around 3 am on 8/2/2018 patient began experiencing severe left sided chest pain and tightness radiating to the left shoulder. Pain was described as 10/10 in intensity, unable to describe quality. There was no other associated symptoms such as SOB, or diaphoresis. He reported relief of symptoms shortly after taking nitroglycerin and isordil. He remained without pain after the episode and was subsequently taken to the [facility] ED by his daughter at around 9:00 am. At the ED in [place] he was found to have an MI with EKG showing STEMI with ST elevations in II, III and AVF.  Pt had /85, RR 18, HR 67. SPO2 99% on RA. He was given Plavix 600mg and Aspirin 325, Lopressor 25 and Heparin 5000 U. He was then transferred to [facility] for higher level of care.     Upon arrival to [facility], pt was taken to the cath lab where Hrt cath showed mLAD with 99% stenosis, LCircumflex ?80% and RPDA 99%, with 2 stents to the mLAD and 1 to the pLAD. He was then transferred to the CCU for further management with plan for staging tomorrow.     Of note, patient had similar episode in May, and was hospitalized for 11 days in [facility]. Otherwise has had history of CP pain with significant exertion, usually relieved with rest. 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  ====================    MEDICATIONS  (STANDING):  aspirin  chewable 81 milliGRAM(s) Oral daily  atorvastatin 80 milliGRAM(s) Oral at bedtime  clopidogrel Tablet 75 milliGRAM(s) Oral daily  heparin  Infusion. 1200 Unit(s)/Hr (12 mL/Hr) IV Continuous <Continuous>  lisinopril 5 milliGRAM(s) Oral daily  metoprolol succinate ER 25 milliGRAM(s) Oral daily  warfarin 7.5 milliGRAM(s) Oral once    MEDICATIONS  (PRN):  heparin  Injectable 6500 Unit(s) IV Push every 6 hours PRN For aPTT less than 40  heparin  Injectable 3000 Unit(s) IV Push every 6 hours PRN For aPTT between 40 - 57    ====================  VITALS (Last 12 hrs):  ====================    T(C): 36.8 (08-05-18 @ 17:44), Max: 36.8 (08-05-18 @ 13:08)  T(F): 98.2 (08-05-18 @ 17:44), Max: 98.2 (08-05-18 @ 13:08)  HR: 82 (08-05-18 @ 19:00) (66 - 100)  BP: 132/81 (08-05-18 @ 18:09) (112/69 - 138/94)  BP(mean): 100 (08-05-18 @ 18:09) (82 - 109)  RR: 15 (08-05-18 @ 18:09) (14 - 20)  SpO2: 96% (08-05-18 @ 19:00) (96% - 96%)      I&O's Summary    04 Aug 2018 07:01  -  05 Aug 2018 07:00  --------------------------------------------------------  IN: 942 mL / OUT: 1675 mL / NET: -733 mL    05 Aug 2018 07:01  -  05 Aug 2018 19:38  --------------------------------------------------------  IN: 1008 mL / OUT: 600 mL / NET: 408 mL        ====================  NEW LABS:  ====================  08-05    134<L>  |  101  |  18  ----------------------------<  97  4.9   |  20<L>  |  1.22    Ca    9.2      05 Aug 2018 04:53  Phos  3.9     08-05  Mg     1.9     08-05    TPro  7.3  /  Alb  3.6  /  TBili  0.4  /  DBili  x   /  AST  27  /  ALT  24  /  AlkPhos  68  08-05    PT/INR - ( 05 Aug 2018 04:53 )   PT: 18.5 sec;   INR: 1.68 ratio         PTT - ( 05 Aug 2018 11:07 )  PTT:94.2 sec        ====================  PLAN:  ====================  - DAPT, high dose statin, Beta blocker, ACE  - Hep GTT w/ bridge to coumadin (LV thrombus) 10mg tonight   - SIADH based upon urine studies, 1L fluid restriction, trend Na  - Trend coags     Elizabeth Dhillon [facility] NP  Beeper #[id number]  Spectra # [phone]

## 2018-08-05 NOTE — PROGRESS NOTE ADULT - SUBJECTIVE AND OBJECTIVE BOX
PATIENT:  JIMMIE SANCHEZ  [id number]    CHIEF COMPLAINT:  Patient is a 77y old  Male who presents with a chief complaint of Chest Pain (02 Aug 2018 14:35)      INTERVAL HISTORY:    REVIEW OF SYSTEMS:   General: No fevers, chills, malaise  HEENT: No headaches, acute changes in vision, throat pain, dysphagia  CVS: No chest pain or palpitations  Pulm: No SOB, cough, or wheezing  GI: No abdominal pain, nausea, vomiting, changes in bowel  : No dysuria or hematuria  Ext: No edema or claudications    MEDICATIONS  (STANDING):  aspirin  chewable 81 milliGRAM(s) Oral daily  atorvastatin 80 milliGRAM(s) Oral at bedtime  clopidogrel Tablet 75 milliGRAM(s) Oral daily  heparin  Infusion. 1200 Unit(s)/Hr (12 mL/Hr) IV Continuous <Continuous>  lisinopril 5 milliGRAM(s) Oral daily  metoprolol tartrate 25 milliGRAM(s) Oral two times a day    MEDICATIONS  (PRN):  heparin  Injectable 6500 Unit(s) IV Push every 6 hours PRN For aPTT less than 40  heparin  Injectable 3000 Unit(s) IV Push every 6 hours PRN For aPTT between 40 - 57      OBJECTIVE:  ICU Vital Signs Last 24 Hrs  T(C): 36.7 (05 Aug 2018 06:00), Max: 36.8 (04 Aug 2018 07:00)  T(F): 98 (05 Aug 2018 06:00), Max: 98.3 (04 Aug 2018 17:00)  HR: 74 (05 Aug 2018 06:00) (56 - 88)  BP: 131/76 (05 Aug 2018 06:00) (100/64 - 149/80)  BP(mean): 91 (05 Aug 2018 06:00) (72 - 115)  ABP: --  ABP(mean): --  RR: 17 (05 Aug 2018 06:00) (14 - 20)  SpO2: 94% (05 Aug 2018 06:00) (94% - 99%)      Adult Advanced Hemodynamics Last 24 Hrs  CVP(mm Hg): --  CVP(cm H2O): --  CO: --  CI: --  PA: --  PA(mean): --  PCWP: --  SVR: --  SVRI: --  PVR: --  PVRI: --  CAPILLARY BLOOD GLUCOSE        CAPILLARY BLOOD GLUCOSE          I&O's Summary    03 Aug 2018 07:01  -  04 Aug 2018 07:00  --------------------------------------------------------  IN: 1327 mL / OUT: 1500 mL / NET: -173 mL    04 Aug 2018 07:01  -  05 Aug 2018 06:25  --------------------------------------------------------  IN: 910 mL / OUT: 1675 mL / NET: -765 mL      Daily     Daily Weight in k.4 (05 Aug 2018 06:00)    PHYSICAL EXAM:       General: NAD        HEENT: NCAT, PERRL, EOMI, normal oropharynx, neck supple, no JVD        CVS: RRR, nl S1, S2, no murmurs, rubs, gallops       Pulm: CTA b/l, no crackles, wheezing, rhonchi        GI: Normal BS, soft, nontender, nondistended       : No CVA tenderness       Ext: + peripheral pulses, no edema, cyanosis, or clubbing        Neuro: AOx3, no focal deficits      TELEMETRY:     EKG:     IMAGING:    LABS:                          13.7   6.1   )-----------( 149      ( 05 Aug 2018 04:53 )             39.5     08-05    134<L>  |  101  |  18  ----------------------------<  97  4.9   |  20<L>  |  1.22    Ca    9.2      05 Aug 2018 04:53  Phos  3.9     08-05  Mg     1.9     08-05    TPro  7.3  /  Alb  3.6  /  TBili  0.4  /  DBili  x   /  AST  27  /  ALT  24  /  AlkPhos  68  08-05    LIVER FUNCTIONS - ( 05 Aug 2018 04:53 )  Alb: 3.6 g/dL / Pro: 7.3 g/dL / ALK PHOS: 68 U/L / ALT: 24 U/L / AST: 27 U/L / GGT: x           PT/INR - ( 05 Aug 2018 04:53 )   PT: 18.5 sec;   INR: 1.68 ratio         PTT - ( 05 Aug 2018 04:53 )  PTT:95.2 sec PATIENT:  JIMMIE SANCHEZ  [id number]    CHIEF COMPLAINT:  Patient is a 77y old  Male who presents with a chief complaint of Chest Pain (02 Aug 2018 14:35)      INTERVAL HISTORY: Patient seen and examined at bedside. NATHALIA o/n. Denies cp, sob, n/v.    REVIEW OF SYSTEMS:   General: No fevers, chills, malaise  HEENT: No headaches, acute changes in vision, throat pain, dysphagia  CVS: No chest pain or palpitations  Pulm: No SOB, cough, or wheezing  GI: No abdominal pain, nausea, vomiting, changes in bowel  : No dysuria or hematuria  Ext: No edema or claudications    MEDICATIONS  (STANDING):  aspirin  chewable 81 milliGRAM(s) Oral daily  atorvastatin 80 milliGRAM(s) Oral at bedtime  clopidogrel Tablet 75 milliGRAM(s) Oral daily  heparin  Infusion. 1200 Unit(s)/Hr (12 mL/Hr) IV Continuous <Continuous>  lisinopril 5 milliGRAM(s) Oral daily  metoprolol tartrate 25 milliGRAM(s) Oral two times a day    MEDICATIONS  (PRN):  heparin  Injectable 6500 Unit(s) IV Push every 6 hours PRN For aPTT less than 40  heparin  Injectable 3000 Unit(s) IV Push every 6 hours PRN For aPTT between 40 - 57      OBJECTIVE:  ICU Vital Signs Last 24 Hrs  T(C): 36.7 (05 Aug 2018 06:00), Max: 36.8 (04 Aug 2018 07:00)  T(F): 98 (05 Aug 2018 06:00), Max: 98.3 (04 Aug 2018 17:00)  HR: 74 (05 Aug 2018 06:00) (56 - 88)  BP: 131/76 (05 Aug 2018 06:00) (100/64 - 149/80)  BP(mean): 91 (05 Aug 2018 06:00) (72 - 115)  ABP: --  ABP(mean): --  RR: 17 (05 Aug 2018 06:00) (14 - 20)  SpO2: 94% (05 Aug 2018 06:00) (94% - 99%)      I&O's Summary    03 Aug 2018 07:01  -  04 Aug 2018 07:00  --------------------------------------------------------  IN: 1327 mL / OUT: 1500 mL / NET: -173 mL    04 Aug 2018 07:01  -  05 Aug 2018 06:25  --------------------------------------------------------  IN: 910 mL / OUT: 1675 mL / NET: -765 mL      Daily     Daily Weight in k.4 (05 Aug 2018 06:00)    PHYSICAL EXAM:       General: NAD        HEENT: NCAT, PERRL, EOMI, normal oropharynx       Neck: supple, no JVD        CVS: RRR, nl S1, S2, no murmurs, rubs, gallops       Pulm: CTA b/l, no crackles, wheezing, rhonchi        GI: Normal BS, soft, nontender, nondistended       : No CVA tenderness       Ext: + peripheral pulses, no edema, cyanosis, or clubbing        Neuro: AOx3, no focal deficits      TELEMETRY:     EKG:     IMAGING:    LABS:                          13.7   6.1   )-----------( 149      ( 05 Aug 2018 04:53 )             39.5     08-05    134<L>  |  101  |  18  ----------------------------<  97  4.9   |  20<L>  |  1.22    Ca    9.2      05 Aug 2018 04:53  Phos  3.9     08-05  Mg     1.9     08-05    TPro  7.3  /  Alb  3.6  /  TBili  0.4  /  DBili  x   /  AST  27  /  ALT  24  /  AlkPhos  68  08-05    LIVER FUNCTIONS - ( 05 Aug 2018 04:53 )  Alb: 3.6 g/dL / Pro: 7.3 g/dL / ALK PHOS: 68 U/L / ALT: 24 U/L / AST: 27 U/L / GGT: x           PT/INR - ( 05 Aug 2018 04:53 )   PT: 18.5 sec;   INR: 1.68 ratio         PTT - ( 05 Aug 2018 04:53 )  PTT:95.2 sec

## 2018-08-05 NOTE — PROGRESS NOTE ADULT - ASSESSMENT
77 year old Mexican male with past medical history of HTN and HLD, presenting from outside ED with chest pain and EKG consistent with a STEMI (ST elevation in II, III and AVF), given , Plavix 600, Lopressor 25 and Heparin 5000 U at OSH. s/p cath (radial access) 8/2, found to have mLAD, circumflex and RPDA stenosis, with 2 stents to the mLAD and 1 to pLAD, s/p staging 8/3 with stent to mid circ 80%. EF 40% and LVEDP 14.    # Neuro   - No active issues    # Pulm  - NAD, sating 97% on RA     # Cards  HTN, STEMI (II, III, AVF) s/p cath (8/2/2018); mLAD with 99% stenosis, LCircumflex 80% and RPDA 99%, with 2 stents to the mLAD and 1 to the pLAD, s/p staging with stent to mid circ.  Aspirin 81, Plavix 75 and Lipitor 80, Lisinopril 5, Switching lopressor 25 bid to toprol 25 daily  Hold home Atenolol, Captopril and Isordil  If BP stable and pt not with signs/symptoms of heart failure, may consider starting Lopressor   Heparin to Coumadin bridge started 8/3; apical thrombus; INR 1.68 ; 7.5mg Coumadin tonight    Renal  Hyponatremia, Na trending down  -F/U repeat CBC and replete if necessary  -Monitor volume status and UOP  -F/U urine studies     Heme   No acute issues 77 year old [place] Islander male with past medical history of HTN and HLD, presenting from outside ED with chest pain and EKG consistent with a STEMI (ST elevation in II, III and AVF), given , Plavix 600, Lopressor 25 and Heparin 5000 U at OSH. s/p cath (radial access) 8/2, found to have mLAD, circumflex and RPDA stenosis, with 2 stents to the mLAD and 1 to pLAD, s/p staging 8/3 with stent to mid circ 80%. EF 40% and LVEDP 14.    # Neuro   - No active issues    # Pulm  - NAD, sating 97% on RA     # Cards  HTN, STEMI (II, III, AVF) s/p cath (8/2/2018); mLAD with 99% stenosis, LCircumflex 80% and RPDA 99%, with 2 stents to the mLAD and 1 to the pLAD, s/p staging with stent to mid circ.  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 Na 134 today.  - Monitor volume status, lytes    # Heme   - No acute issues  - INR 1.68, potential discharge tomorrow once therapeutic.    DVT ppx: on hep ggt

## 2018-08-06 LAB
ALBUMIN SERPL ELPH-MCNC: 3.7 G/DL — SIGNIFICANT CHANGE UP (ref 3.3–5)
ALBUMIN SERPL ELPH-MCNC: 3.8 G/DL — SIGNIFICANT CHANGE UP (ref 3.3–5)
ALP SERPL-CCNC: 66 U/L — SIGNIFICANT CHANGE UP (ref 40–120)
ALP SERPL-CCNC: 71 U/L — SIGNIFICANT CHANGE UP (ref 40–120)
ALT FLD-CCNC: 60 U/L — HIGH (ref 10–45)
ALT FLD-CCNC: 79 U/L — HIGH (ref 10–45)
ANION GAP SERPL CALC-SCNC: 12 MMOL/L — SIGNIFICANT CHANGE UP (ref 5–17)
ANION GAP SERPL CALC-SCNC: 12 MMOL/L — SIGNIFICANT CHANGE UP (ref 5–17)
APTT BLD: 131.3 SEC — CRITICAL HIGH (ref 27.5–37.4)
AST SERPL-CCNC: 61 U/L — HIGH (ref 10–40)
AST SERPL-CCNC: 74 U/L — HIGH (ref 10–40)
BILIRUB SERPL-MCNC: 0.2 MG/DL — SIGNIFICANT CHANGE UP (ref 0.2–1.2)
BILIRUB SERPL-MCNC: 0.2 MG/DL — SIGNIFICANT CHANGE UP (ref 0.2–1.2)
BUN SERPL-MCNC: 16 MG/DL — SIGNIFICANT CHANGE UP (ref 7–23)
BUN SERPL-MCNC: 17 MG/DL — SIGNIFICANT CHANGE UP (ref 7–23)
CALCIUM SERPL-MCNC: 8.7 MG/DL — SIGNIFICANT CHANGE UP (ref 8.4–10.5)
CALCIUM SERPL-MCNC: 8.9 MG/DL — SIGNIFICANT CHANGE UP (ref 8.4–10.5)
CHLORIDE SERPL-SCNC: 97 MMOL/L — SIGNIFICANT CHANGE UP (ref 96–108)
CHLORIDE SERPL-SCNC: 99 MMOL/L — SIGNIFICANT CHANGE UP (ref 96–108)
CO2 SERPL-SCNC: 19 MMOL/L — LOW (ref 22–31)
CO2 SERPL-SCNC: 21 MMOL/L — LOW (ref 22–31)
CREAT SERPL-MCNC: 0.99 MG/DL — SIGNIFICANT CHANGE UP (ref 0.5–1.3)
CREAT SERPL-MCNC: 1.09 MG/DL — SIGNIFICANT CHANGE UP (ref 0.5–1.3)
GLUCOSE SERPL-MCNC: 100 MG/DL — HIGH (ref 70–99)
GLUCOSE SERPL-MCNC: 95 MG/DL — SIGNIFICANT CHANGE UP (ref 70–99)
HCT VFR BLD CALC: 41.2 % — SIGNIFICANT CHANGE UP (ref 39–50)
HGB BLD-MCNC: 14.3 G/DL — SIGNIFICANT CHANGE UP (ref 13–17)
INR BLD: 3.48 RATIO — HIGH (ref 0.88–1.16)
MAGNESIUM SERPL-MCNC: 1.7 MG/DL — SIGNIFICANT CHANGE UP (ref 1.6–2.6)
MAGNESIUM SERPL-MCNC: 2.1 MG/DL — SIGNIFICANT CHANGE UP (ref 1.6–2.6)
MCHC RBC-ENTMCNC: 32.2 PG — SIGNIFICANT CHANGE UP (ref 27–34)
MCHC RBC-ENTMCNC: 34.6 GM/DL — SIGNIFICANT CHANGE UP (ref 32–36)
MCV RBC AUTO: 92.8 FL — SIGNIFICANT CHANGE UP (ref 80–100)
PHOSPHATE SERPL-MCNC: 3.5 MG/DL — SIGNIFICANT CHANGE UP (ref 2.5–4.5)
PHOSPHATE SERPL-MCNC: 4.3 MG/DL — SIGNIFICANT CHANGE UP (ref 2.5–4.5)
PLATELET # BLD AUTO: 152 K/UL — SIGNIFICANT CHANGE UP (ref 150–400)
POTASSIUM SERPL-MCNC: 4.1 MMOL/L — SIGNIFICANT CHANGE UP (ref 3.5–5.3)
POTASSIUM SERPL-MCNC: 4.8 MMOL/L — SIGNIFICANT CHANGE UP (ref 3.5–5.3)
POTASSIUM SERPL-SCNC: 4.1 MMOL/L — SIGNIFICANT CHANGE UP (ref 3.5–5.3)
POTASSIUM SERPL-SCNC: 4.8 MMOL/L — SIGNIFICANT CHANGE UP (ref 3.5–5.3)
PROT SERPL-MCNC: 7.1 G/DL — SIGNIFICANT CHANGE UP (ref 6–8.3)
PROT SERPL-MCNC: 7.6 G/DL — SIGNIFICANT CHANGE UP (ref 6–8.3)
PROTHROM AB SERPL-ACNC: 38.5 SEC — HIGH (ref 9.8–12.7)
RBC # BLD: 4.44 M/UL — SIGNIFICANT CHANGE UP (ref 4.2–5.8)
RBC # FLD: 11.9 % — SIGNIFICANT CHANGE UP (ref 10.3–14.5)
SODIUM SERPL-SCNC: 128 MMOL/L — LOW (ref 135–145)
SODIUM SERPL-SCNC: 131 MMOL/L — LOW (ref 135–145)
WBC # BLD: 5.4 K/UL — SIGNIFICANT CHANGE UP (ref 3.8–10.5)
WBC # FLD AUTO: 5.4 K/UL — SIGNIFICANT CHANGE UP (ref 3.8–10.5)

## 2018-08-06 PROCEDURE — 99291 CRITICAL CARE FIRST HOUR: CPT

## 2018-08-06 PROCEDURE — 93010 ELECTROCARDIOGRAM REPORT: CPT

## 2018-08-06 RX ORDER — PANTOPRAZOLE SODIUM 20 MG/1
40 TABLET, DELAYED RELEASE ORAL
Qty: 0 | Refills: 0 | Status: DISCONTINUED | OUTPATIENT
Start: 2018-08-06 | End: 2018-08-07

## 2018-08-06 RX ORDER — MAGNESIUM SULFATE 500 MG/ML
2 VIAL (ML) INJECTION ONCE
Qty: 0 | Refills: 0 | Status: COMPLETED | OUTPATIENT
Start: 2018-08-06 | End: 2018-08-06

## 2018-08-06 RX ORDER — ATORVASTATIN CALCIUM 80 MG/1
40 TABLET, FILM COATED ORAL AT BEDTIME
Qty: 0 | Refills: 0 | Status: DISCONTINUED | OUTPATIENT
Start: 2018-08-06 | End: 2018-08-07

## 2018-08-06 RX ORDER — WARFARIN SODIUM 2.5 MG/1
3 TABLET ORAL ONCE
Qty: 0 | Refills: 0 | Status: COMPLETED | OUTPATIENT
Start: 2018-08-06 | End: 2018-08-06

## 2018-08-06 RX ORDER — ACETAMINOPHEN 500 MG
650 TABLET ORAL ONCE
Qty: 0 | Refills: 0 | Status: COMPLETED | OUTPATIENT
Start: 2018-08-06 | End: 2018-08-06

## 2018-08-06 RX ADMIN — Medication 30 MILLILITER(S): at 09:54

## 2018-08-06 RX ADMIN — Medication 50 GRAM(S): at 05:34

## 2018-08-06 RX ADMIN — PANTOPRAZOLE SODIUM 40 MILLIGRAM(S): 20 TABLET, DELAYED RELEASE ORAL at 06:50

## 2018-08-06 RX ADMIN — Medication 650 MILLIGRAM(S): at 12:10

## 2018-08-06 RX ADMIN — Medication 50 MILLIGRAM(S): at 05:34

## 2018-08-06 RX ADMIN — HEPARIN SODIUM 0 UNIT(S)/HR: 5000 INJECTION INTRAVENOUS; SUBCUTANEOUS at 05:35

## 2018-08-06 RX ADMIN — Medication 81 MILLIGRAM(S): at 12:10

## 2018-08-06 RX ADMIN — Medication 650 MILLIGRAM(S): at 12:40

## 2018-08-06 RX ADMIN — WARFARIN SODIUM 3 MILLIGRAM(S): 2.5 TABLET ORAL at 21:06

## 2018-08-06 RX ADMIN — LISINOPRIL 5 MILLIGRAM(S): 2.5 TABLET ORAL at 05:34

## 2018-08-06 RX ADMIN — ATORVASTATIN CALCIUM 40 MILLIGRAM(S): 80 TABLET, FILM COATED ORAL at 21:06

## 2018-08-06 RX ADMIN — CLOPIDOGREL BISULFATE 75 MILLIGRAM(S): 75 TABLET, FILM COATED ORAL at 12:10

## 2018-08-06 RX ADMIN — Medication 30 MILLILITER(S): at 06:50

## 2018-08-06 NOTE — CHART NOTE - NSCHARTNOTEFT_GEN_A_CORE
====================  CCU MIDNIGHT ROUNDS  ====================    JIMMIE SANCHEZ  [id number]    ====================  SUMMARY:  ====================    76 yo Salvadorean M HTN, HLD transferred from OSH w/ STEMI s/p LHC 8/2 w/ YECENIA x 2 mLAD, YECENIA x 1 pLAD. Staged YECENIA x 1 mCx 8/3. W/ LV thrombus (coumadin). W/ SIADH     ====================  NEW EVENTS:  ====================    w/ reproducible R shoulder pain, now resolved. lipitor dec to 40. no new complaints     ====================  VITALS (Last 12 hrs):  ====================    T(C): 36.6 (08-06-18 @ 19:00), Max: 36.6 (08-06-18 @ 19:00)  HR: 66 (08-06-18 @ 22:00) (64 - 74)  BP: 129/77 (08-06-18 @ 22:00) (114/65 - 150/78)  BP(mean): 98 (08-06-18 @ 22:00) (84 - 111)  ABP: --  ABP(mean): --  RR: 20 (08-06-18 @ 22:00) (15 - 34)  SpO2: 98% (08-06-18 @ 22:00) (93% - 100%)  Wt(kg): --  CVP(mm Hg): --  CO: --  CI: --  PA: --  PA(mean): --  PA(direct): --  PCWP: --  SVR: --    TELEMETRY:        *BLOOD GAS/ARTERIAL/MIXED/VENOUS  *LACTATE    I&O's Summary    05 Aug 2018 07:01  -  06 Aug 2018 07:00  --------------------------------------------------------  IN: 1448 mL / OUT: 1200 mL / NET: 248 mL    06 Aug 2018 07:01  -  06 Aug 2018 22:50  --------------------------------------------------------  IN: 440 mL / OUT: 150 mL / NET: 290 mL        ====================  PLAN:  ====================    HEALTH ISSUES - PROBLEM Dx:        HEALTH ISSUES - R/O PROBLEM Dx:      Angeline CASILLAS/MEKHIU  #[id number] ====================  CCU MIDNIGHT ROUNDS  ====================    JIMMIE SANCHEZ  [id number]    ====================  SUMMARY:  ====================    78 yo Ruben M HTN, HLD transferred from OSH w/ STEMI s/p LHC 8/2 w/ YECENIA x 2 mLAD, YECENIA x 1 pLAD. Staged YECENIA x 1 mCx 8/3. W/ LV thrombus (coumadin). W/ SIADH     ====================  NEW EVENTS:  ====================    w/ reproducible R shoulder pain, now resolved. lipitor dec to 40. no new complaints     ====================  VITALS (Last 12 hrs):  ====================    T(C): 36.6 (08-06-18 @ 19:00), Max: 36.6 (08-06-18 @ 19:00)  HR: 66 (08-06-18 @ 22:00) (64 - 74)  BP: 129/77 (08-06-18 @ 22:00) (114/65 - 150/78)  BP(mean): 98 (08-06-18 @ 22:00) (84 - 111)  RR: 20 (08-06-18 @ 22:00) (15 - 34)  SpO2: 98% (08-06-18 @ 22:00) (93% - 100%)    TELEMETRY: sinus     I&O's Summary    05 Aug 2018 07:01  -  06 Aug 2018 07:00  --------------------------------------------------------  IN: 1448 mL / OUT: 1200 mL / NET: 248 mL    06 Aug 2018 07:01  -  06 Aug 2018 22:50  --------------------------------------------------------  IN: 440 mL / OUT: 150 mL / NET: 290 mL    ====================  PLAN:  ====================  - STEMI - DAPT, lipitor dec to 40 given shoulder pain and age, c/w toprol and lisinopril and increase as tolerated   - LV thrombus - INR therapeutic, daily coumadin - triple therapy x 1 mo   - c/w fluid restriction for ADELFO Mayers VINNY/CCU  #[id number]

## 2018-08-06 NOTE — PROVIDER CONTACT NOTE (CRITICAL VALUE NOTIFICATION) - ACTION/TREATMENT ORDERED:
no further intervention at this time
d/c heparin gtt INR 3.48
follow heparin nomogram/ will continuie to jen

## 2018-08-06 NOTE — PROGRESS NOTE ADULT - ASSESSMENT
77 year old Filipino male with past medical history of HTN and HLD, presenting from outside ED with chest pain and EKG consistent with a STEMI (ST elevation in II, III and AVF), given , Plavix 600, Lopressor 25 and Heparin 5000 U at OSH. s/p cath (radial access) 8/2, found to have mLAD, circumflex and RPDA stenosis, with 2 stents to the mLAD and 1 to pLAD, s/p staging 8/3 with stent to mid circ 80%. EF 40% and LVEDP 14.    # Neuro   - No active issues    # Pulm  - NAD, sating 97% on RA     # Cards  HTN, STEMI (II, III, AVF) s/p cath (8/2/2018); mLAD with 99% stenosis, LCircumflex 80% and RPDA 99%, with 2 stents to the mLAD and 1 to the pLAD, s/p staging with stent to mid circ.  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 Na 134 today.  - Monitor volume status, lytes    # Heme   - No acute issues  - INR 1.68, potential discharge tomorrow once therapeutic.    DVT ppx: on hep ggt 77 year old Mongolian male with past medical history of HTN and HLD, presenting from outside ED with chest pain and EKG consistent with a STEMI (ST elevation in II, III and AVF), given , Plavix 600, Lopressor 25 and Heparin 5000 U at OSH. s/p cath (radial access) 8/2, found to have mLAD, circumflex and RPDA stenosis, with 2 stents to the mLAD and 1 to pLAD, s/p staging 8/3 with stent to mid circ 80%. EF 40% and LVEDP 14.    # Neuro   - No active issues    # Pulm  - NAD, sating 97% on RA     # Cards  HTN, STEMI (II, III, AVF) s/p cath (8/2/2018); mLAD with 99% stenosis, LCircumflex 80% and RPDA 99%, with 2 stents to the mLAD and 1 to the pLAD, s/p staging with stent to mid circ.  - Aspirin 81, Plavix 75 and Lipitor 80, Lisinopril 5, toprol 25 daily  Hold home Atenolol, Captopril and Isordil  - Apical LV thrombus on TTE  - INR >3; coumadin dosed at 3mg for tonight    #GI  LFTs mildly elevated  -suspect secondary to statin   -decrease Lipitor from 80mg--->40mg (ok to decrease to 40 given pt is almost 77y.olmost 78y.o)    # Renal  - Hyponatremia in low 130s, possibly 2/2 SIADH of unknown etiology; (Daniella 39, however FENa 0.5%, BUN 14),  -Na trending down, now 128  -C/w fluid restriction to 1L/day   - Monitor volume status, lytes    # Heme   - No acute issues    Dispo: Optimize cardiac meds, and coumadin dosing; establish adequate outpt follow up--will likely be at [facility] given proximity to daughter's home.       Antonia Randall PGY1  Internal Medicine   [phone]

## 2018-08-06 NOTE — PROVIDER CONTACT NOTE (CRITICAL VALUE NOTIFICATION) - ASSESSMENT
no overt s/s of bleeding
NO S/S OF BLEEDING
Pt in no acute distress VS Stable bleeding precautions maintained
no overt sis of bleeding. no complaints of pain. vss

## 2018-08-06 NOTE — PROVIDER CONTACT NOTE (CRITICAL VALUE NOTIFICATION) - BACKGROUND
s/p cath with stent placement; heparin given in the lab
ON HEPARIN GTTS
See H&P
open fopr staged cath tomorrow

## 2018-08-06 NOTE — PROGRESS NOTE ADULT - SUBJECTIVE AND OBJECTIVE BOX
PATIENT:  JIMMIE SANCHEZ  [id number]    CHIEF COMPLAINT:  Patient is a 77y old  Male who presents with a chief complaint of Chest Pain (02 Aug 2018 14:35)      INTERVAL HISTORYOVERNIGHT EVENTS:      REVIEW OF SYSTEMS:    Constitutional:     [ ] negative [ ] fevers [ ] chills [ ] weight loss [ ] weight gain  HEENT:                  [ ] negative [ ] dry eyes [ ] eye irritation [ ] postnasal drip [ ] nasal congestion  CV:                         [ ] negative  [ ] chest pain [ ] orthopnea [ ] palpitations [ ] murmur  Resp:                     [ ] negative [ ] cough [ ] shortness of breath [ ] dyspnea [ ] wheezing [ ] sputum [ ] hemoptysis  GI:                          [ ] negative [ ] nausea [ ] vomiting [ ] diarrhea [ ] constipation [ ] abd pain [ ] dysphagia   :                        [ ] negative [ ] dysuria [ ] nocturia [ ] hematuria [ ] increased urinary frequency  Musculoskeletal: [ ] negative [ ] back pain [ ] myalgias [ ] arthralgias [ ] fracture  Skin:                       [ ] negative [ ] rash [ ] itch  Neurological:        [ ] negative [ ] headache [ ] dizziness [ ] syncope [ ] weakness [ ] numbness  Psychiatric:           [ ] negative [ ] anxiety [ ] depression  Endocrine:            [ ] negative [ ] diabetes [ ] thyroid problem  Heme/Lymph:      [ ] negative [ ] anemia [ ] bleeding problem  Allergic/Immune: [ ] negative [ ] itchy eyes [ ] nasal discharge [ ] hives [ ] angioedema    [ ] All other systems negative  [ ] Unable to assess ROS because ________.    MEDICATIONS:  MEDICATIONS  (STANDING):  aspirin  chewable 81 milliGRAM(s) Oral daily  atorvastatin 80 milliGRAM(s) Oral at bedtime  clopidogrel Tablet 75 milliGRAM(s) Oral daily  lisinopril 5 milliGRAM(s) Oral daily  metoprolol succinate ER 50 milliGRAM(s) Oral daily  pantoprazole    Tablet 40 milliGRAM(s) Oral before breakfast    MEDICATIONS  (PRN):      ALLERGIES:  Allergies    No Known Allergies    Intolerances        OBJECTIVE:  ICU Vital Signs Last 24 Hrs  T(C): 36.6 (06 Aug 2018 07:00), Max: 36.8 (05 Aug 2018 13:08)  T(F): 97.9 (06 Aug 2018 07:00), Max: 98.2 (05 Aug 2018 13:08)  HR: 70 (06 Aug 2018 07:00) (66 - 100)  BP: 140/80 (06 Aug 2018 07:00) (108/69 - 149/78)  BP(mean): 109 (06 Aug 2018 07:00) (78 - 109)  ABP: --  ABP(mean): --  RR: 18 (06 Aug 2018 07:00) (14 - 20)  SpO2: 95% (06 Aug 2018 07:00) (76% - 100%)      Adult Advanced Hemodynamics Last 24 Hrs  CVP(mm Hg): --  CVP(cm H2O): --  CO: --  CI: --  PA: --  PA(mean): --  PCWP: --  SVR: --  SVRI: --  PVR: --  PVRI: --  CAPILLARY BLOOD GLUCOSE        CAPILLARY BLOOD GLUCOSE        I&O's Summary    05 Aug 2018 07:01  -  06 Aug 2018 07:00  --------------------------------------------------------  IN: 1448 mL / OUT: 1200 mL / NET: 248 mL      Daily     Daily     PHYSICAL EXAMINATION:  General: WN/WD NAD  HEENT: PERRLA, EOMI, moist mucous membranes  Neurology: A&Ox3, nonfocal, HARLEY x 4  Respiratory: CTA B/L, normal respiratory effort, no wheezes, crackles, rales  CV: RRR, S1S2, no murmurs, rubs or gallops  Abdominal: Soft, NT, ND +BS, Last BM  Extremities: No edema, + peripheral pulses  Incisions:   Tubes:    LABS:                          14.3   5.4   )-----------( 152      ( 06 Aug 2018 04:53 )             41.2     08-06    128<L>  |  97  |  17  ----------------------------<  95  4.1   |  19<L>  |  0.99    Ca    8.7      06 Aug 2018 04:53  Phos  4.3     08-06  Mg     1.7     08-06    TPro  7.1  /  Alb  3.7  /  TBili  0.2  /  DBili  x   /  AST  61<H>  /  ALT  60<H>  /  AlkPhos  66  08-06    LIVER FUNCTIONS - ( 06 Aug 2018 04:53 )  Alb: 3.7 g/dL / Pro: 7.1 g/dL / ALK PHOS: 66 U/L / ALT: 60 U/L / AST: 61 U/L / GGT: x           PT/INR - ( 06 Aug 2018 04:54 )   PT: 38.5 sec;   INR: 3.48 ratio         PTT - ( 06 Aug 2018 04:54 )  PTT:131.3 sec            TELEMETRY:     EKG:     IMAGING: PATIENT:  JIMMIE SANCHEZ  [id number]    CHIEF COMPLAINT:  Patient is a 77y old  Male who presents with a chief complaint of Chest Pain (02 Aug 2018 14:35)      INTERVAL HISTORYOVERNIGHT EVENTS:  -complained of burning mid epigastric burning pain o/n; now complaining of right sided chest and shoulder pain this morning---likely MSK etiology    REVIEW OF SYSTEMS:    [x] All other systems negative      MEDICATIONS:  MEDICATIONS  (STANDING):  aspirin  chewable 81 milliGRAM(s) Oral daily  atorvastatin 80 milliGRAM(s) Oral at bedtime  clopidogrel Tablet 75 milliGRAM(s) Oral daily  lisinopril 5 milliGRAM(s) Oral daily  metoprolol succinate ER 50 milliGRAM(s) Oral daily  pantoprazole    Tablet 40 milliGRAM(s) Oral before breakfast    MEDICATIONS  (PRN):      ALLERGIES:  Allergies    No Known Allergies    Intolerances        OBJECTIVE:  ICU Vital Signs Last 24 Hrs  T(C): 36.6 (06 Aug 2018 07:00), Max: 36.8 (05 Aug 2018 13:08)  T(F): 97.9 (06 Aug 2018 07:00), Max: 98.2 (05 Aug 2018 13:08)  HR: 70 (06 Aug 2018 07:00) (66 - 100)  BP: 140/80 (06 Aug 2018 07:00) (108/69 - 149/78)  BP(mean): 109 (06 Aug 2018 07:00) (78 - 109)  ABP: --  ABP(mean): --  RR: 18 (06 Aug 2018 07:00) (14 - 20)  SpO2: 95% (06 Aug 2018 07:00) (76% - 100%)    I&O's Summary    05 Aug 2018 07:01  -  06 Aug 2018 07:00  --------------------------------------------------------  IN: 1448 mL / OUT: 1200 mL / NET: 248 mL      Daily         PHYSICAL EXAMINATION:  General: NAD   HEENT: NCAT, PERRL, EOMI, normal oropharynx  Neck: supple, no JVD   CVS: RRR, nl S1, S2, no murmurs, rubs, gallops  Pulm: CTA b/l, no crackles, wheezing, rhonchi   GI: Normal BS, soft, nontender, nondistended  : No CVA tenderness  Ext: + peripheral pulses, no edema, cyanosis, or clubbing   Neuro: AOx3, no focal deficits                            14.3   5.4   )-----------( 152      ( 06 Aug 2018 04:53 )             41.2     08-06    128<L>  |  97  |  17  ----------------------------<  95  4.1   |  19<L>  |  0.99    Ca    8.7      06 Aug 2018 04:53  Phos  4.3     08-06  Mg     1.7     08-06    TPro  7.1  /  Alb  3.7  /  TBili  0.2  /  DBili  x   /  AST  61<H>  /  ALT  60<H>  /  AlkPhos  66  08-06    LIVER FUNCTIONS - ( 06 Aug 2018 04:53 )  Alb: 3.7 g/dL / Pro: 7.1 g/dL / ALK PHOS: 66 U/L / ALT: 60 U/L / AST: 61 U/L / GGT: x           PT/INR - ( 06 Aug 2018 04:54 )   PT: 38.5 sec;   INR: 3.48 ratio         PTT - ( 06 Aug 2018 04:54 )  PTT:131.3 sec

## 2018-08-07 ENCOUNTER — TRANSCRIPTION ENCOUNTER (OUTPATIENT)
Age: 78
End: 2018-08-07

## 2018-08-07 VITALS — SYSTOLIC BLOOD PRESSURE: 136 MMHG | HEART RATE: 72 BPM | DIASTOLIC BLOOD PRESSURE: 74 MMHG | RESPIRATION RATE: 13 BRPM

## 2018-08-07 PROBLEM — I10 ESSENTIAL (PRIMARY) HYPERTENSION: Chronic | Status: ACTIVE | Noted: 2018-08-02

## 2018-08-07 PROBLEM — Z00.00 ENCOUNTER FOR PREVENTIVE HEALTH EXAMINATION: Status: ACTIVE | Noted: 2018-08-07

## 2018-08-07 PROBLEM — E78.5 HYPERLIPIDEMIA, UNSPECIFIED: Chronic | Status: ACTIVE | Noted: 2018-08-02

## 2018-08-07 LAB
ALBUMIN SERPL ELPH-MCNC: 4.2 G/DL — SIGNIFICANT CHANGE UP (ref 3.3–5)
ALP SERPL-CCNC: 71 U/L — SIGNIFICANT CHANGE UP (ref 40–120)
ALT FLD-CCNC: 94 U/L — HIGH (ref 10–45)
ANION GAP SERPL CALC-SCNC: 12 MMOL/L — SIGNIFICANT CHANGE UP (ref 5–17)
APTT BLD: 41.2 SEC — HIGH (ref 27.5–37.4)
AST SERPL-CCNC: 84 U/L — HIGH (ref 10–40)
BILIRUB SERPL-MCNC: 0.2 MG/DL — SIGNIFICANT CHANGE UP (ref 0.2–1.2)
BUN SERPL-MCNC: 19 MG/DL — SIGNIFICANT CHANGE UP (ref 7–23)
CALCIUM SERPL-MCNC: 9.3 MG/DL — SIGNIFICANT CHANGE UP (ref 8.4–10.5)
CHLORIDE SERPL-SCNC: 97 MMOL/L — SIGNIFICANT CHANGE UP (ref 96–108)
CO2 SERPL-SCNC: 23 MMOL/L — SIGNIFICANT CHANGE UP (ref 22–31)
CREAT SERPL-MCNC: 1.27 MG/DL — SIGNIFICANT CHANGE UP (ref 0.5–1.3)
GLUCOSE SERPL-MCNC: 98 MG/DL — SIGNIFICANT CHANGE UP (ref 70–99)
HCT VFR BLD CALC: 39.8 % — SIGNIFICANT CHANGE UP (ref 39–50)
HGB BLD-MCNC: 13.9 G/DL — SIGNIFICANT CHANGE UP (ref 13–17)
INR BLD: 4.68 RATIO — HIGH (ref 0.88–1.16)
MAGNESIUM SERPL-MCNC: 2.1 MG/DL — SIGNIFICANT CHANGE UP (ref 1.6–2.6)
MCHC RBC-ENTMCNC: 32.6 PG — SIGNIFICANT CHANGE UP (ref 27–34)
MCHC RBC-ENTMCNC: 34.9 GM/DL — SIGNIFICANT CHANGE UP (ref 32–36)
MCV RBC AUTO: 93.5 FL — SIGNIFICANT CHANGE UP (ref 80–100)
PHOSPHATE SERPL-MCNC: 3.6 MG/DL — SIGNIFICANT CHANGE UP (ref 2.5–4.5)
PLATELET # BLD AUTO: 151 K/UL — SIGNIFICANT CHANGE UP (ref 150–400)
POTASSIUM SERPL-MCNC: 4.6 MMOL/L — SIGNIFICANT CHANGE UP (ref 3.5–5.3)
POTASSIUM SERPL-SCNC: 4.6 MMOL/L — SIGNIFICANT CHANGE UP (ref 3.5–5.3)
PROT SERPL-MCNC: 7.6 G/DL — SIGNIFICANT CHANGE UP (ref 6–8.3)
PROTHROM AB SERPL-ACNC: 52.7 SEC — HIGH (ref 9.8–12.7)
RBC # BLD: 4.25 M/UL — SIGNIFICANT CHANGE UP (ref 4.2–5.8)
RBC # FLD: 12 % — SIGNIFICANT CHANGE UP (ref 10.3–14.5)
SODIUM SERPL-SCNC: 132 MMOL/L — LOW (ref 135–145)
URATE SERPL-MCNC: 6.2 MG/DL — SIGNIFICANT CHANGE UP (ref 3.4–8.8)
WBC # BLD: 5.9 K/UL — SIGNIFICANT CHANGE UP (ref 3.8–10.5)
WBC # FLD AUTO: 5.9 K/UL — SIGNIFICANT CHANGE UP (ref 3.8–10.5)

## 2018-08-07 PROCEDURE — 99291 CRITICAL CARE FIRST HOUR: CPT

## 2018-08-07 PROCEDURE — 93010 ELECTROCARDIOGRAM REPORT: CPT

## 2018-08-07 RX ORDER — LISINOPRIL 2.5 MG/1
10 TABLET ORAL DAILY
Qty: 0 | Refills: 0 | Status: DISCONTINUED | OUTPATIENT
Start: 2018-08-07 | End: 2018-08-07

## 2018-08-07 RX ORDER — CLOPIDOGREL BISULFATE 75 MG/1
1 TABLET, FILM COATED ORAL
Qty: 30 | Refills: 0 | OUTPATIENT
Start: 2018-08-07 | End: 2018-09-05

## 2018-08-07 RX ORDER — OMEGA-3 ACID ETHYL ESTERS 1 G
0 CAPSULE ORAL
Qty: 0 | Refills: 0 | COMMUNITY

## 2018-08-07 RX ORDER — ATENOLOL 25 MG/1
1 TABLET ORAL
Qty: 0 | Refills: 0 | COMMUNITY

## 2018-08-07 RX ORDER — ASPIRIN/CALCIUM CARB/MAGNESIUM 324 MG
1 TABLET ORAL
Qty: 0 | Refills: 0 | COMMUNITY

## 2018-08-07 RX ORDER — WARFARIN SODIUM 2.5 MG/1
1 TABLET ORAL
Qty: 5 | Refills: 0
Start: 2018-08-07 | End: 2018-08-11

## 2018-08-07 RX ORDER — ASPIRIN/CALCIUM CARB/MAGNESIUM 324 MG
1 TABLET ORAL
Qty: 30 | Refills: 0 | OUTPATIENT
Start: 2018-08-07 | End: 2018-09-05

## 2018-08-07 RX ORDER — SIMVASTATIN 20 MG/1
1 TABLET, FILM COATED ORAL
Qty: 0 | Refills: 0 | COMMUNITY

## 2018-08-07 RX ORDER — ATORVASTATIN CALCIUM 80 MG/1
1 TABLET, FILM COATED ORAL
Qty: 30 | Refills: 0 | OUTPATIENT
Start: 2018-08-07 | End: 2018-09-05

## 2018-08-07 RX ORDER — ISOSORBIDE DINITRATE 5 MG/1
5 TABLET ORAL
Qty: 0 | Refills: 0 | COMMUNITY

## 2018-08-07 RX ORDER — LISINOPRIL 2.5 MG/1
1 TABLET ORAL
Qty: 30 | Refills: 0
Start: 2018-08-07 | End: 2018-09-05

## 2018-08-07 RX ORDER — WARFARIN SODIUM 2.5 MG/1
2 TABLET ORAL ONCE
Qty: 0 | Refills: 0 | Status: DISCONTINUED | OUTPATIENT
Start: 2018-08-07 | End: 2018-08-07

## 2018-08-07 RX ORDER — METOPROLOL TARTRATE 50 MG
1 TABLET ORAL
Qty: 30 | Refills: 0 | OUTPATIENT
Start: 2018-08-07 | End: 2018-09-05

## 2018-08-07 RX ORDER — CHLORPHENIRAMINE MALEATE 4 MG
2 TABLET ORAL
Qty: 0 | Refills: 0 | COMMUNITY

## 2018-08-07 RX ORDER — CAPTOPRIL 12.5 MG/1
1 TABLET ORAL
Qty: 0 | Refills: 0 | COMMUNITY

## 2018-08-07 RX ADMIN — CLOPIDOGREL BISULFATE 75 MILLIGRAM(S): 75 TABLET, FILM COATED ORAL at 12:16

## 2018-08-07 RX ADMIN — Medication 50 MILLIGRAM(S): at 06:05

## 2018-08-07 RX ADMIN — PANTOPRAZOLE SODIUM 40 MILLIGRAM(S): 20 TABLET, DELAYED RELEASE ORAL at 06:05

## 2018-08-07 RX ADMIN — Medication 81 MILLIGRAM(S): at 12:16

## 2018-08-07 RX ADMIN — LISINOPRIL 10 MILLIGRAM(S): 2.5 TABLET ORAL at 06:05

## 2018-08-07 NOTE — DISCHARGE NOTE ADULT - CARE PLAN
Principal Discharge DX:	Myocardial infarction  Goal:	No chest pain or shortness of breath  Assessment and plan of treatment:	You were hospitalized because you had a heart attack. We placed three stents in into the arteries in your heart and started you on new medications. Please take your medications as prescribed and follow  up with cardiologist on Thursday, August 9, 2018 at 2:30pm; at [facility] Cardiology Fellow clinic  Secondary Diagnosis:	Hypertension  Assessment and plan of treatment:	Please take  your medications as prescribed and f/u with cardiologist  Secondary Diagnosis:	Hyperlipidemia  Assessment and plan of treatment:	Please take your atorvastatin as prescribed Principal Discharge DX:	Myocardial infarction  Goal:	No chest pain or shortness of breath  Assessment and plan of treatment:	You were hospitalized because you had a heart attack. We placed three stents in into the arteries in your heart and started you on new medications. Please take your medications as prescribed and follow  up with cardiologist on Thursday, August 9, 2018 at 2:30pm; at [facility] Cardiology Fellow clinic. Also please have your INR checked at that appointment and have them adjust your warfarin dose accordingly. You will also need to get new warfarin prescriptions at that appointment. Please limit your activity, and no heavy lifting and strenuous exercise until cleared by your outpatient cardiologist.  Secondary Diagnosis:	Hypertension  Assessment and plan of treatment:	Please take  your medications as prescribed and f/u with cardiologist  Secondary Diagnosis:	Hyperlipidemia  Assessment and plan of treatment:	Please take your atorvastatin as prescribed

## 2018-08-07 NOTE — DISCHARGE NOTE ADULT - PLAN OF CARE
Please take  your medications as prescribed and f/u with cardiologist Please take your atorvastatin as prescribed No chest pain or shortness of breath You were hospitalized because you had a heart attack. We placed three stents in into the arteries in your heart and started you on new medications. Please take your medications as prescribed and follow  up with cardiologist on Thursday, August 9, 2018 at 2:30pm; at [facility] Cardiology Fellow clinic You were hospitalized because you had a heart attack. We placed three stents in into the arteries in your heart and started you on new medications. Please take your medications as prescribed and follow  up with cardiologist on Thursday, August 9, 2018 at 2:30pm; at [facility] Cardiology Fellow clinic. Also please have your INR checked at that appointment and have them adjust your warfarin dose accordingly. You will also need to get new warfarin prescriptions at that appointment. Please limit your activity, and no heavy lifting and strenuous exercise until cleared by your outpatient cardiologist.

## 2018-08-07 NOTE — PROGRESS NOTE ADULT - ATTENDING COMMENTS
Patient is seen and examined with fellow, NP and the CCU house-staff. I agree with the history, physical and the assessment and plan.  stemi s/p PCI with staged procedure   on DAPT  on coumadin for the LV thrmobus  SIADH based upon urine studies
Patient is seen and examined with fellow, NP and the CCU house-staff. I agree with the history, physical and the assessment and plan.  stemi s/p PCI to LAD with staged PCI to LCX with course c/b LV thrombus  on DAPT  dose Coumadin  monitor Na - monitor fluid status  will send urine studies
Patient is seen and examined with fellow, NP and the CCU house-staff. I agree with the history, physical and the assessment and plan.  s/p staged PCI  c/w DAPT  will c/w AC for the LV thrombus  will start BB and/or ace inh once BP can tolerate
Patient presented with STEMI, now status post multiple PCI.    Continue dual antiplatelet therapy, high intensity statin, beta-blocker, and ACEi.
Patient presented with STEMI, now status post multiple PCI. Post PCI course complicated by apical LV thrombus.    Continue dual antiplatelet therapy, high intensity statin, beta-blocker, and ACEi.    Continue warfarin anticoagulation for INR 2.0 - 3.0 given LV thrombus.

## 2018-08-07 NOTE — PROGRESS NOTE ADULT - SUBJECTIVE AND OBJECTIVE BOX
PATIENT:  JIMMIE SANCHEZ  [id number]    CHIEF COMPLAINT:  Patient is a 77y old  Male who presents with a chief complaint of Chest Pain (02 Aug 2018 14:35)      INTERVAL HISTORYOVERNIGHT EVENTS:      REVIEW OF SYSTEMS:    Constitutional:     [ ] negative [ ] fevers [ ] chills [ ] weight loss [ ] weight gain  HEENT:                  [ ] negative [ ] dry eyes [ ] eye irritation [ ] postnasal drip [ ] nasal congestion  CV:                         [ ] negative  [ ] chest pain [ ] orthopnea [ ] palpitations [ ] murmur  Resp:                     [ ] negative [ ] cough [ ] shortness of breath [ ] dyspnea [ ] wheezing [ ] sputum [ ] hemoptysis  GI:                          [ ] negative [ ] nausea [ ] vomiting [ ] diarrhea [ ] constipation [ ] abd pain [ ] dysphagia   :                        [ ] negative [ ] dysuria [ ] nocturia [ ] hematuria [ ] increased urinary frequency  Musculoskeletal: [ ] negative [ ] back pain [ ] myalgias [ ] arthralgias [ ] fracture  Skin:                       [ ] negative [ ] rash [ ] itch  Neurological:        [ ] negative [ ] headache [ ] dizziness [ ] syncope [ ] weakness [ ] numbness  Psychiatric:           [ ] negative [ ] anxiety [ ] depression  Endocrine:            [ ] negative [ ] diabetes [ ] thyroid problem  Heme/Lymph:      [ ] negative [ ] anemia [ ] bleeding problem  Allergic/Immune: [ ] negative [ ] itchy eyes [ ] nasal discharge [ ] hives [ ] angioedema    [ ] All other systems negative  [ ] Unable to assess ROS because ________.    MEDICATIONS:  MEDICATIONS  (STANDING):  aspirin  chewable 81 milliGRAM(s) Oral daily  atorvastatin 40 milliGRAM(s) Oral at bedtime  clopidogrel Tablet 75 milliGRAM(s) Oral daily  lisinopril 10 milliGRAM(s) Oral daily  metoprolol succinate ER 50 milliGRAM(s) Oral daily  pantoprazole    Tablet 40 milliGRAM(s) Oral before breakfast    MEDICATIONS  (PRN):      ALLERGIES:  Allergies    No Known Allergies    Intolerances        OBJECTIVE:  ICU Vital Signs Last 24 Hrs  T(C): 36.5 (07 Aug 2018 04:00), Max: 36.6 (06 Aug 2018 19:00)  T(F): 97.7 (07 Aug 2018 04:00), Max: 97.9 (06 Aug 2018 19:00)  HR: 68 (07 Aug 2018 06:00) (62 - 76)  BP: 115/60 (07 Aug 2018 06:00) (104/74 - 150/78)  BP(mean): 88 (07 Aug 2018 06:00) (81 - 111)  ABP: --  ABP(mean): --  RR: 18 (07 Aug 2018 06:00) (15 - 34)  SpO2: 100% (07 Aug 2018 06:00) (93% - 100%)      Adult Advanced Hemodynamics Last 24 Hrs  CVP(mm Hg): --  CVP(cm H2O): --  CO: --  CI: --  PA: --  PA(mean): --  PCWP: --  SVR: --  SVRI: --  PVR: --  PVRI: --  CAPILLARY BLOOD GLUCOSE        CAPILLARY BLOOD GLUCOSE        I&O's Summary    06 Aug 2018 07:01  -  07 Aug 2018 07:00  --------------------------------------------------------  IN: 680 mL / OUT: 750 mL / NET: -70 mL      Daily     Daily Weight in k.9 (07 Aug 2018 05:00)    PHYSICAL EXAMINATION:  General: WN/WD NAD  HEENT: PERRLA, EOMI, moist mucous membranes  Neurology: A&Ox3, nonfocal, HARLEY x 4  Respiratory: CTA B/L, normal respiratory effort, no wheezes, crackles, rales  CV: RRR, S1S2, no murmurs, rubs or gallops  Abdominal: Soft, NT, ND +BS, Last BM  Extremities: No edema, + peripheral pulses  Incisions:   Tubes:    LABS:                          13.9   5.9   )-----------( 151      ( 07 Aug 2018 05:05 )             39.8     08-07    132<L>  |  97  |  19  ----------------------------<  98  4.6   |  23  |  1.27    Ca    9.3      07 Aug 2018 05:05  Phos  3.6     08-07  Mg     2.1     08-07    TPro  7.6  /  Alb  4.2  /  TBili  0.2  /  DBili  x   /  AST  84<H>  /  ALT  94<H>  /  AlkPhos  71  08-07    LIVER FUNCTIONS - ( 07 Aug 2018 05:05 )  Alb: 4.2 g/dL / Pro: 7.6 g/dL / ALK PHOS: 71 U/L / ALT: 94 U/L / AST: 84 U/L / GGT: x           PT/INR - ( 07 Aug 2018 05:05 )   PT: 52.7 sec;   INR: 4.68 ratio         PTT - ( 07 Aug 2018 05:05 )  PTT:41.2 sec PATIENT:  JIMMIE SANCHEZ  [id number]    CHIEF COMPLAINT:  Patient is a 77y old  Male who presents with a chief complaint of Chest Pain (02 Aug 2018 14:35)      INTERVAL HISTORYOVERNIGHT EVENTS:      REVIEW OF SYSTEMS:    Constitutional:     [ ] negative [ ] fevers [ ] chills [ ] weight loss [ ] weight gain  HEENT:                  [ ] negative [ ] dry eyes [ ] eye irritation [ ] postnasal drip [ ] nasal congestion  CV:                         [ ] negative  [ ] chest pain [ ] orthopnea [ ] palpitations [ ] murmur  Resp:                     [ ] negative [ ] cough [ ] shortness of breath [ ] dyspnea [ ] wheezing [ ] sputum [ ] hemoptysis  GI:                          [ ] negative [ ] nausea [ ] vomiting [ ] diarrhea [ ] constipation [ ] abd pain [ ] dysphagia   :                        [ ] negative [ ] dysuria [ ] nocturia [ ] hematuria [ ] increased urinary frequency  Musculoskeletal:     [ ] negative [ ] back pain [ ] myalgias [ ] arthralgias [ ] fracture [x] right shoulder and neck pain   Skin:                       [ ] negative [ ] rash [ ] itch  Neurological:        [ ] negative [ ] headache [ ] dizziness [ ] syncope [ ] weakness [ ] numbness  Psychiatric:           [ ] negative [ ] anxiety [ ] depression  Endocrine:            [ ] negative [ ] diabetes [ ] thyroid problem  Heme/Lymph:      [ ] negative [ ] anemia [ ] bleeding problem  Allergic/Immune: [ ] negative [ ] itchy eyes [ ] nasal discharge [ ] hives [ ] angioedema    [x] All other systems negative      MEDICATIONS:  MEDICATIONS  (STANDING):  aspirin  chewable 81 milliGRAM(s) Oral daily  atorvastatin 40 milliGRAM(s) Oral at bedtime  clopidogrel Tablet 75 milliGRAM(s) Oral daily  lisinopril 10 milliGRAM(s) Oral daily  metoprolol succinate ER 50 milliGRAM(s) Oral daily  pantoprazole    Tablet 40 milliGRAM(s) Oral before breakfast    MEDICATIONS  (PRN):      ALLERGIES:  Allergies    No Known Allergies    Intolerances        OBJECTIVE:  ICU Vital Signs Last 24 Hrs  T(C): 36.5 (07 Aug 2018 04:00), Max: 36.6 (06 Aug 2018 19:00)  T(F): 97.7 (07 Aug 2018 04:00), Max: 97.9 (06 Aug 2018 19:00)  HR: 68 (07 Aug 2018 06:00) (62 - 76)  BP: 115/60 (07 Aug 2018 06:00) (104/74 - 150/78)  BP(mean): 88 (07 Aug 2018 06:00) (81 - 111)  ABP: --  ABP(mean): --  RR: 18 (07 Aug 2018 06:00) (15 - 34)  SpO2: 100% (07 Aug 2018 06:00) (93% - 100%)      CAPILLARY BLOOD GLUCOSE        I&O's Summary    06 Aug 2018 07:01  -  07 Aug 2018 07:00  --------------------------------------------------------  IN: 680 mL / OUT: 750 mL / NET: -70 mL      Daily     Daily Weight in k.9 (07 Aug 2018 05:00)    PHYSICAL EXAMINATION:  General: WN/WD NAD  HEENT: PERRLA, EOMI, moist mucous membranes  Neurology: A&Ox3, nonfocal, HARLEY x 4  Respiratory: CTA B/L, normal respiratory effort, no wheezes, crackles, rales  CV: RRR, S1S2, no murmurs, rubs or gallops  Abdominal: Soft, NT, ND +BS, Last BM  Extremities: No edema, + peripheral pulses  Incisions:   Tubes:    LABS:                          13.9   5.9   )-----------( 151      ( 07 Aug 2018 05:05 )             39.8     08-07    132<L>  |  97  |  19  ----------------------------<  98  4.6   |  23  |  1.27    Ca    9.3      07 Aug 2018 05:05  Phos  3.6     08-07  Mg     2.1     08-07    TPro  7.6  /  Alb  4.2  /  TBili  0.2  /  DBili  x   /  AST  84<H>  /  ALT  94<H>  /  AlkPhos  71  08-07    LIVER FUNCTIONS - ( 07 Aug 2018 05:05 )  Alb: 4.2 g/dL / Pro: 7.6 g/dL / ALK PHOS: 71 U/L / ALT: 94 U/L / AST: 84 U/L / GGT: x           PT/INR - ( 07 Aug 2018 05:05 )   PT: 52.7 sec;   INR: 4.68 ratio         PTT - ( 07 Aug 2018 05:05 )  PTT:41.2 sec

## 2018-08-07 NOTE — DISCHARGE NOTE ADULT - PROVIDER TOKENS
FREE:[LAST:[Austen],FIRST:[Kavya],PHONE:[phone]],FAX:[phone]],ADDRESS:[[phone] [place] [address]  [facility]  Oncology Building 4th Floor]]

## 2018-08-07 NOTE — DISCHARGE NOTE ADULT - HOSPITAL COURSE
History of Present Illness    77 year old [place]ese male with a history of hypertension, hyperlipidemia who presented from outside hospital for chest pain. At around 3 am on 8/2/2018 patient began experiencing severe left sided chest pain and tightness radiating to the left shoulder. Pain was described as 10/10 in intensity, unable to describe quality. There was no other associated symptoms such as SOB, or diaphoresis. He reported relief of symptoms shortly after taking nitroglycerin and isordil. He remained without pain after the episode and was subsequently taken to the [facility] ED by his daughter at around 9:00 am. At the ED in [place] he was found to have an MI with EKG showing STEMI with ST elevations in II, III and AVF.  Pt had /85, RR 18, HR 67. SPO2 99% on RA. He was given Plavix 600mg and Aspirin 325, Lopressor 25 and Heparin 5000 U. He was then transferred to [facility] for higher level of care. Of note, patient had similar episode in May, and was hospitalized for 11 days in [facility]. Otherwise has had history of CP pain with significant exertion, usually relieved with rest.     Hospital Course   Upon arrival to [facility] on 8/2/18, pt was taken to the cath lab where he under went left heart catheterization via radial access, which showed mLAD with 99% stenosis, LCircumflex 80% and RPDA 99%, with 2 stents to the mLAD and 1 to the pLAD. He was then transferred to the CCU for further management with plan for further staging. The following day, on 8/3/18, he underwent underwent staged PCI to the LCx lesion s/p YECENIA x1. Patient’s course was complicated by an apical thrombus found on TTE (LVEF 40-45%). Patient tolerated medical management with DAPT on ASA plavix, atorvastatin 40, and lisinopril 5, toprol 50, and heparin drip with bridge to coumadin, achieving therapeutic INR prior to discharge. At the time of discharge patient was asymptomatic

## 2018-08-07 NOTE — DISCHARGE NOTE ADULT - MEDICATION SUMMARY - MEDICATIONS TO TAKE
I will START or STAY ON the medications listed below when I get home from the hospital:    aspirin 81 mg oral tablet, chewable  -- 1 tab(s) by mouth once a day  -- Indication: For ST elevation myocardial infarction (STEMI)    lisinopril 10 mg oral tablet  -- 1 tab(s) by mouth once a day  -- Indication: For ST elevation myocardial infarction (STEMI)/Hypertension    nitroglycerin 0.4 mg sublingual tablet  -- 0.4 milligram(s) under tongue prn, As Needed - for chest pain  -- Indication: For ST elevation myocardial infarction (STEMI)    warfarin 2 mg oral tablet  -- 1 tab(s) by mouth once a day (at bedtime)   -- Indication: For ST elevation myocardial infarction (STEMI)    atorvastatin 40 mg oral tablet  -- 1 tab(s) by mouth once a day (at bedtime)  -- Indication: For ST elevation myocardial infarction (STEMI)    clopidogrel 75 mg oral tablet  -- 1 tab(s) by mouth once a day  -- Indication: For ST elevation myocardial infarction (STEMI)    metoprolol succinate 50 mg oral tablet, extended release  -- 1 tab(s) by mouth once a day  -- Indication: For ST elevation myocardial infarction (STEMI)/Hypertension    Fish Oil oral capsule  -- Indication: For Supplement I will START or STAY ON the medications listed below when I get home from the hospital:    aspirin 81 mg oral tablet, chewable  -- 1 tab(s) by mouth once a day  -- Indication: For ST elevation myocardial infarction (STEMI)    lisinopril 10 mg oral tablet  -- 1 tab(s) by mouth once a day  -- Indication: For ST elevation myocardial infarction (STEMI)/Hypertension    nitroglycerin 0.4 mg sublingual tablet  -- 0.4 milligram(s) under tongue prn, As Needed - for chest pain  -- Indication: For ST elevation myocardial infarction (STEMI)    warfarin 2 mg oral tablet  -- 1 tab(s) by mouth once a day (at bedtime)   -- Indication: For ST elevation myocardial infarction (STEMI)    atorvastatin 40 mg oral tablet  -- 1 tab(s) by mouth once a day (at bedtime)  -- Indication: For ST elevation myocardial infarction (STEMI)    clopidogrel 75 mg oral tablet  -- 1 tab(s) by mouth once a day  -- Indication: For ST elevation myocardial infarction (STEMI)    metoprolol succinate 50 mg oral tablet, extended release  -- 1 tab(s) by mouth once a day  -- Indication: For ST elevation myocardial infarction (STEMI)/Hypertension

## 2018-08-07 NOTE — DISCHARGE NOTE ADULT - PATIENT PORTAL LINK FT
You can access the [facility] Patient Portal, offered by [facility], by registering with the following website: [email or web address]

## 2018-08-07 NOTE — DISCHARGE NOTE ADULT - CARE PROVIDER_API CALL
Kavya Boyce  [phone] [place] [id number]  Cardiology Fellow Clinic  Oncology Building 4th Floor  Phone: [phone]  Fax: [phone]

## 2018-08-07 NOTE — DISCHARGE NOTE ADULT - OTHER SIGNIFICANT FINDINGS
< from: Cardiac Cath Lab - Adult (08.02.18 @ 10:52) >  Cath Lab Report -- Comprehensive ReportStudy date: 08/02/2018. Right radial artery access. Left heart  catheterization. Ventriculography was performed. Left coronary artery  angiography. The vessel was injected utilizing a catheter. Right coronary  artery angiography. The vessel was injected utilizing a catheter.  RADIATION EXPOSURE: 18.4 min. A drug-eluting stent was performed on the 99  % lesion in the mid LAD. Following intervention there was a 1 % residual  stenosis. Vessel setup was performed. A 6FR JL3.5 LAUNCHER guiding  catheter was used to intubate the vessel. Vessel setup was performed. A  CARMEN [id number] wire was used to cross the lesion. Balloon angioplasty was  performed, using a 2.0 X 15 EUPHORA balloon, with 1 inflations and a  maximum inflation pressureof 14 guillermo. A 2.50 X 12 SYNERGY drug-eluting  stent was placed across the lesion and deployed at a maximum inflation  pressure of 18 guillermo. A 3.00 X 16 SYNERGY drug-eluting stent was placed  across the lesion and deployed at a maximum inflation pressureof 20 guillermo.  A 3.00 X 12 SYNERGY drug-eluting stent was placed across the lesion andGlobal left ventricular function was moderately depressed. EF  estimated was40 %.  CORONARY VESSELS: The coronary circulation is right dominant.  LM:   --  LM: Angiography showed minor luminal irregularities with no flow  limiting lesions.  LAD:   --  Mid LAD: There was a 99 % stenosis.CX:   --  Distal circumflex: There was a 80 % stenosis.  RCA:   --  RPDA: There was a 100 % stenosis.  ----------------------------------------------------------------------    < from: Cardiac Cath Lab - Adult (08.03.18 @ 07:56) >  Study date: 08/03/2018Cath Lab Report -- Comprehensive ReportDAPT for one year in view of STEMI 08/02/2018.  Continue aggressive risk modification and medical therapy.CX:   --  Distal circumflex: There was a 80 % stenosis.A drug-eluting stent was performed on the 80 % lesion in the  distal circumflex.    -------------------------------------------------------------------------------  < from: TTE with Doppler (w/Cont) (08.02.18 @ 17:04) >  1. Normal mitral valve. Mild mitral regurgitation.  2. Calcified aortic valve with normal opening.  Mild-moderate aortic regurgitation.  3. Eccentric left ventricular hypertrophy (dilated left  ventricle with normal relative wall thickness).  4. Mild segmental left ventricular systolic dysfunction. A  small echodensity is seen at the left ventricular apex  (0.75cm x 0.3cm), which is suggestive of thrombus.  Endocardial visualization enhanced with intravenous  injection of echo contrast (Definity). The apical inferior  wall, and the apical anterior wall are hypokinetic. The  apical septum, and the apical lateral wall are akinetic.  5. Normal right ventricular size and function. TAPSE = 1.8  cm.  6. Estimated right ventricular systolic pressure equals 31  mm Hg, assuming right atrial pressure equals 8 mm Hg,  consistent with normal pulmonary pressures.Study Date: 8/2/2018PROCEDURE: Transthoracic echocardiogram with 2-D,

## 2018-08-07 NOTE — PROGRESS NOTE ADULT - ASSESSMENT
77 year old [place] male with past medical history of HTN and HLD, presenting from outside ED with chest pain and EKG consistent with a STEMI (ST elevation in II, III and AVF), given , Plavix 600, Lopressor 25 and Heparin 5000 U at OSH. s/p cath (radial access) 8/2, found to have mLAD, circumflex and RPDA stenosis, with 2 stents to the mLAD and 1 to pLAD, s/p staging 8/3 with stent to mid circ 80%. EF 40% and LVEDP 14.    # Neuro   - No active issues    # Pulm  - NAD, sating 97% on RA     # Cards  HTN, STEMI (II, III, AVF) s/p cath (8/2/2018); mLAD with 99% stenosis, LCircumflex 80% and RPDA 99%, with 2 stents to the mLAD and 1 to the pLAD, s/p staging with stent to mid circ.  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       Antonia Randall PGY1  Internal Medicine   [phone] 77 year old [place] male with past medical history of HTN and HLD, presenting from outside ED with chest pain and EKG consistent with a STEMI (ST elevation in II, III and AVF), given , Plavix 600, Lopressor 25 and Heparin 5000 U at OSH. s/p cath (radial access) 8/2, found to have mLAD, circumflex and RPDA stenosis, with 2 stents to the mLAD and 1 to pLAD, s/p staging 8/3 with stent to mid circ 80%. EF 40% and LVEDP 14.    # Neuro   - No active issues    # Pulm  - NAD, sating 97% on RA     # Cards  HTN, STEMI (II, III, AVF) s/p cath (8/2/2018); mLAD with 99% stenosis, LCircumflex 80% and RPDA 99%, with 2 stents to the mLAD and 1 to the pLAD, s/p staging with stent to mid circ.  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

## 2018-08-07 NOTE — DISCHARGE NOTE ADULT - MEDICATION SUMMARY - MEDICATIONS TO STOP TAKING
I will STOP taking the medications listed below when I get home from the hospital:    Isordil 5 mg sublingual tablet  -- 5 milligram(s) under tongue 2 times a day    aspirin 81 mg oral tablet  -- 1 tab(s) by mouth once a day    atenolol 50 mg oral tablet  -- 1 tab(s) by mouth once a day    captopril 25 mg oral tablet  -- 1 tab(s) by mouth 3 times a day    simvastatin 10 mg oral tablet  -- 1 tab(s) by mouth once a day (at bedtime)    chlorpheniramine 2 mg oral tablet, chewable  -- 2 tab(s) by mouth 4 times a day as needed I will STOP taking the medications listed below when I get home from the hospital:    Isordil 5 mg sublingual tablet  -- 5 milligram(s) under tongue 2 times a day    aspirin 81 mg oral tablet  -- 1 tab(s) by mouth once a day    atenolol 50 mg oral tablet  -- 1 tab(s) by mouth once a day    captopril 25 mg oral tablet  -- 1 tab(s) by mouth 3 times a day    simvastatin 10 mg oral tablet  -- 1 tab(s) by mouth once a day (at bedtime)    Fish Oil oral capsule    chlorpheniramine 2 mg oral tablet, chewable  -- 2 tab(s) by mouth 4 times a day as needed

## 2018-08-09 ENCOUNTER — APPOINTMENT (OUTPATIENT)
Dept: INTERNAL MEDICINE | Facility: CLINIC | Age: 78
End: 2018-08-09

## 2018-08-09 ENCOUNTER — APPOINTMENT (OUTPATIENT)
Dept: CARDIOLOGY | Facility: HOSPITAL | Age: 78
End: 2018-08-09

## 2018-08-21 ENCOUNTER — APPOINTMENT (OUTPATIENT)
Dept: INTERNAL MEDICINE | Facility: CLINIC | Age: 78
End: 2018-08-21

## 2018-12-26 PROCEDURE — 86900 BLOOD TYPING SEROLOGIC ABO: CPT

## 2018-12-26 PROCEDURE — 85730 THROMBOPLASTIN TIME PARTIAL: CPT

## 2018-12-26 PROCEDURE — C1769: CPT

## 2018-12-26 PROCEDURE — C9606: CPT | Mod: LD

## 2018-12-26 PROCEDURE — 82570 ASSAY OF URINE CREATININE: CPT

## 2018-12-26 PROCEDURE — 80061 LIPID PANEL: CPT

## 2018-12-26 PROCEDURE — 82553 CREATINE MB FRACTION: CPT

## 2018-12-26 PROCEDURE — 99152 MOD SED SAME PHYS/QHP 5/>YRS: CPT

## 2018-12-26 PROCEDURE — 93458 L HRT ARTERY/VENTRICLE ANGIO: CPT | Mod: 59

## 2018-12-26 PROCEDURE — 80048 BASIC METABOLIC PNL TOTAL CA: CPT

## 2018-12-26 PROCEDURE — 84300 ASSAY OF URINE SODIUM: CPT

## 2018-12-26 PROCEDURE — 83036 HEMOGLOBIN GLYCOSYLATED A1C: CPT

## 2018-12-26 PROCEDURE — 86901 BLOOD TYPING SEROLOGIC RH(D): CPT

## 2018-12-26 PROCEDURE — C8929: CPT

## 2018-12-26 PROCEDURE — 84100 ASSAY OF PHOSPHORUS: CPT

## 2018-12-26 PROCEDURE — 83735 ASSAY OF MAGNESIUM: CPT

## 2018-12-26 PROCEDURE — 84484 ASSAY OF TROPONIN QUANT: CPT

## 2018-12-26 PROCEDURE — 85027 COMPLETE CBC AUTOMATED: CPT

## 2018-12-26 PROCEDURE — C1887: CPT

## 2018-12-26 PROCEDURE — C1874: CPT

## 2018-12-26 PROCEDURE — 82550 ASSAY OF CK (CPK): CPT

## 2018-12-26 PROCEDURE — 84550 ASSAY OF BLOOD/URIC ACID: CPT

## 2018-12-26 PROCEDURE — C9600: CPT | Mod: LC

## 2018-12-26 PROCEDURE — 83935 ASSAY OF URINE OSMOLALITY: CPT

## 2018-12-26 PROCEDURE — 93005 ELECTROCARDIOGRAM TRACING: CPT

## 2018-12-26 PROCEDURE — 99153 MOD SED SAME PHYS/QHP EA: CPT

## 2018-12-26 PROCEDURE — 80053 COMPREHEN METABOLIC PANEL: CPT

## 2018-12-26 PROCEDURE — C1725: CPT

## 2018-12-26 PROCEDURE — 85610 PROTHROMBIN TIME: CPT

## 2018-12-26 PROCEDURE — 84443 ASSAY THYROID STIM HORMONE: CPT

## 2018-12-26 PROCEDURE — 83930 ASSAY OF BLOOD OSMOLALITY: CPT

## 2018-12-26 PROCEDURE — 86850 RBC ANTIBODY SCREEN: CPT

## 2018-12-26 PROCEDURE — C1894: CPT

## 2019-06-04 NOTE — PROVIDER CONTACT NOTE (CRITICAL VALUE NOTIFICATION) - DATE AND TIME:
Patient Instructions/Information    Liquid Nitrogen Treatment      Freezing with liquid nitrogen is accompanied by a stinging, burning sensation which usually lasts for ten to fifteen minutes but may persist for several hours.  Tylenol may be used for pain if needed.    A blister usually develops and then forms a dry crust.  The crust should peel off in two to three weeks.  Sometimes the treated area may be a little red after the crust falls off, but this will fade with time.  You should apply the polysporin or Vaseline ointment three times a day to the crusted area to speed up healing.  These sample packets are the same as over-the-counter polysporin.    It is usually best to leave the blister unopened.  However, if the blister is very large, uncomfortable or forms a blood blister, it may be opened with a sterilized needle.    You may take a bath or shower, shampoo your hair or apply cosmetics over the treated area.    If there are any questions or problems, call [facility].   04-Aug-2018 06:00 04-Aug-2018 06:06

## 2020-01-13 NOTE — H&P ADULT - PSH
[facility] - [facility] PRIMARY CARE  FAMILY PRACTICE OFFICE VISIT       NAME: Keanu Colvin  AGE: 14 y o  SEX: female       : 2001        MRN: [id number]    DATE: 2020  TIME: 4:19 PM        Subjective:     Gus Perez is a 14 y o  female who is here for this well-child visit  Immunization History   Administered Date(s) Administered    DTaP 5 2004, 2004, 2004, 2005, 2009    HPV9 2018, 2018, 10/15/2019    Hep A, ped/adol, 2 dose 2011, 2011    Hep B, adult 2003, 2004, 2004    Hib (PRP-OMP) 2004, 2004, 2004, 2005    IPV 2004, 2004, 2005, 2009    Influenza Quadrivalent Preservative Free 3 years and older IM 2018    Influenza TIV (IM) 12/10/2004, 01/10/2005, 2005, 2006, 2007, 10/27/2008, 2009, 2011, 10/10/2012, 10/19/2013, 10/13/2015    Influenza, injectable, quadrivalent, preservative free 0 5 mL 2018, 10/15/2019    MMR 12/10/2004, 2009    Meningococcal B, Recombinant 2020    Meningococcal MCV4P 2020    Meningococcal, Unknown Serogroups 10/13/2015    Pneumococcal Conjugate 13-Valent 2004, 2004, 2004, 2005    Tdap 10/13/2015    Varicella 12/10/2004, 2009     The following portions of the patient's history were reviewed and updated as appropriate: allergies, current medications, past family history, past medical history, past social history, past surgical history and problem list     Current Issues:  Current concerns include cracking noise in the left ankle - states that it began about 4 years ago - does have history of sprains  Currently menstruating? yes; current menstrual pattern: usually lasting 3 to 5 days and with minimal cramping    Well Child Assessment:  History was provided by the mother   Milvia lives with her mother, father and sister (and dogs)  Interval problems include recent illness (sister with sinus infection over [place])  Interval problems do not include chronic stress at home or recent injury  Nutrition  Types of intake include cereals, cow's milk, eggs, fish, meats, fruits, vegetables and juices (milk with cereal, yogurt sometimes and cheese frequently (2-3 servings), juice sporadically)  Junk food includes candy, chips, desserts and soda (soda a few times a week)  Dental  The patient has a dental home  The patient brushes teeth regularly  The patient does not floss regularly  Last dental exam was less than 6 months ago  Elimination  Elimination problems do not include constipation or diarrhea  Behavioral  Behavioral issues do not include performing poorly at school  Sleep  Average sleep duration (hrs): 5+ hours a night - with up to 5 hour naps  The patient snores  There are no sleep problems  Safety  There is no smoking in the home  Home has working smoke alarms? yes  Home has working carbon monoxide alarms? yes  There is no gun in home  School  Current grade level is 10th  Child is doing well in school  Social  After school activity: cheering, volunteer club, mock trial  Screen time per day: many hours  Review of Systems   Constitutional: Negative for chills and fever  HENT: Negative for congestion, rhinorrhea and sore throat  Respiratory: Positive for snoring  Negative for cough, shortness of breath and wheezing  Cardiovascular: Negative for chest pain and palpitations  Gastrointestinal: Negative for constipation, diarrhea, nausea and vomiting  Genitourinary: Negative for dysuria and frequency  Musculoskeletal: Negative for arthralgias and joint swelling  Skin: Negative for rash  Neurological: Negative for dizziness and syncope  Hematological: Does not bruise/bleed easily  Psychiatric/Behavioral: Negative for dysphoric mood and sleep disturbance  The patient is not nervous/anxious  Objective:       Vitals:    01/16/20 1442   BP: 100/70   BP Location: Left arm   Patient Position: Sitting   Cuff Size: Standard   Pulse: 74   Temp: 97 6 °F (36 4 °C)   SpO2: 98%   Weight: 82 7 kg (182 lb 6 oz)   Height: 5' 5 4" (1 661 m)     Growth parameters are noted and are not appropriate for age  Wt Readings from Last 1 Encounters:   01/16/20 82 7 kg (182 lb 6 oz) (97 %, Z= 1 82)*     * Growth percentiles are based on CDC (Girls, 2-20 Years) data  Ht Readings from Last 1 Encounters:   01/16/20 5' 5 4" (1 661 m) (71 %, Z= 0 54)*     * Growth percentiles are based on CDC (Girls, 2-20 Years) data  Body mass index is 29 98 kg/m²  Vitals:    01/16/20 1442   BP: 100/70   Pulse: 74   Temp: 97 6 °F (36 4 °C)   SpO2: 98%       Physical Exam   Constitutional: She appears well-developed and well-nourished  HENT:   Head: Normocephalic and atraumatic  Right Ear: Hearing, tympanic membrane, external ear and ear canal normal    Left Ear: Hearing, tympanic membrane, external ear and ear canal normal    Nose: Nose normal    Mouth/Throat: Oropharynx is clear and moist and mucous membranes are normal    Eyes: Pupils are equal, round, and reactive to light  Conjunctivae and lids are normal    Neck: Trachea normal and normal range of motion  Neck supple  Carotid bruit is not present  No thyroid mass and no thyromegaly present  Cardiovascular: Normal rate, regular rhythm, S1 normal, S2 normal, normal heart sounds and intact distal pulses  No murmur heard  Pulses:       Radial pulses are 2+ on the right side, and 2+ on the left side  Posterior tibial pulses are 2+ on the right side, and 2+ on the left side  Pulmonary/Chest: Effort normal and breath sounds normal  She has no decreased breath sounds  She has no wheezes  She has no rhonchi  She has no rales  Abdominal: Soft  Normal appearance and bowel sounds are normal  She exhibits no distension and no mass   There is no hepatosplenomegaly  There is no tenderness  No hernia  Genitourinary:   Genitourinary Comments: Galen staging for breast = 5 and for genital hair = 4 -- mother present for exam   Musculoskeletal: Normal range of motion  Lymphadenopathy:     She has no cervical adenopathy  Neurological: She is alert  She has normal strength  No sensory deficit (light touch sensation intact and equal in UE and LE bilaterally)  Skin: Skin is warm and dry  No rash noted  Psychiatric: She has a normal mood and affect  Her behavior is normal    Vitals reviewed  Visual Acuity Screening    Right eye Left eye Both eyes   Without correction: 20/20 20/20 20/15   With correction:              Assessment:     Well adolescent  1  Health check for child over 34 days old     2  Encounter for immunization  MENINGOCOCCAL B RECOMBINANT(TRUMENBA)    MENINGOCOCCAL CONJUGATE VACCINE MCV4P IM   3  Body mass index, pediatric, greater than or equal to 95th percentile for age     3  Exercise counseling     5  Nutritional counseling     6  Migraine without status migrainosus, not intractable, unspecified migraine type     7  Seasonal allergies          Plan:         Migraine  Patient will continue with Excedrin Tension Headache as needed  She had one migraine with nausea and vomiting  She does not desire any nausea medication but will let me know if this seems to become a typical part of her migraines and would like a script  Seasonal allergies  Stable  Continue Claritin 10 mg daily and Benadryl as needed  Nutrition and Exercise Counseling: The patient's Body mass index is 29 98 kg/m²  This is 96 %ile (Z= 1 75) based on CDC (Girls, 2-20 Years) BMI-for-age based on BMI available as of 1/16/2020      Nutrition counseling provided:  Avoid juice/sugary drinks, Anticipatory guidance for nutrition given and counseled on healthy eating habits and 5 servings of fruits/vegetables    Exercise counseling provided:  Anticipatory guidance and counseling on exercise and physical activity given, Reduce screen time to less than 2 hours per day and 1 hour of aerobic exercise daily        1  Anticipatory guidance discussed  Gave handout on well-child issues at this age  Specific topics reviewed: importance of regular dental care, importance of regular exercise, importance of varied diet and minimize junk food  2  Development: appropriate for age    1  Immunizations today: Menactra #2 and Trumenba #1  Follow-up in 6 months for Trumenba #2  History of previous adverse reactions to immunizations? no    4  Follow-up visit in 1 year for next well child visit, or sooner as needed  H/O inguinal hernia repair  Right Inguinal Hernia x2  First in 1966'  Second time with mesh in 2010

## 2022-02-14 NOTE — PATIENT PROFILE ADULT. - ALCOHOL USE HISTORY SINGLE SELECT
Procedure To Be Performed At Next Visit: Excision never Detail Level: Detailed Introduction Text (Please End With A Colon): The following procedure was deferred: Instructions (Optional): 8mm

## 2022-04-14 NOTE — DISCHARGE NOTE ADULT - NSTOBACCONEVERSMOKERY/N_GEN_A
No
Continue Regimen: Clobetasol cream PRN for flares. Pt will call for refill.\\nZyrtec daily\\nSarna sensitive skin lotion
Detail Level: Zone

## 2023-07-01 ENCOUNTER — INPATIENT (INPATIENT)
Facility: HOSPITAL | Age: 83
LOS: 2 days | Discharge: HOME | DRG: 287 | End: 2023-07-04
Attending: INTERNAL MEDICINE | Admitting: INTERNAL MEDICINE
Payer: MEDICAID

## 2023-07-01 VITALS
TEMPERATURE: 99 F | DIASTOLIC BLOOD PRESSURE: 75 MMHG | RESPIRATION RATE: 18 BRPM | WEIGHT: 179.9 LBS | SYSTOLIC BLOOD PRESSURE: 147 MMHG | HEART RATE: 70 BPM | OXYGEN SATURATION: 100 % | HEIGHT: 66 IN

## 2023-07-01 DIAGNOSIS — I20.0 UNSTABLE ANGINA: ICD-10-CM

## 2023-07-01 DIAGNOSIS — Z98.890 OTHER SPECIFIED POSTPROCEDURAL STATES: Chronic | ICD-10-CM

## 2023-07-01 LAB
ALBUMIN SERPL ELPH-MCNC: 4 G/DL — SIGNIFICANT CHANGE UP (ref 3.3–5)
ALP SERPL-CCNC: 81 U/L — SIGNIFICANT CHANGE UP (ref 40–120)
ALT FLD-CCNC: 69 U/L — HIGH (ref 10–45)
ANION GAP SERPL CALC-SCNC: 11 MMOL/L — SIGNIFICANT CHANGE UP (ref 5–17)
APTT BLD: 28.4 SEC — SIGNIFICANT CHANGE UP (ref 27.5–35.5)
AST SERPL-CCNC: 56 U/L — HIGH (ref 10–40)
BASOPHILS # BLD AUTO: 0.02 K/UL — SIGNIFICANT CHANGE UP (ref 0–0.2)
BASOPHILS NFR BLD AUTO: 0.3 % — SIGNIFICANT CHANGE UP (ref 0–2)
BILIRUB SERPL-MCNC: 0.2 MG/DL — SIGNIFICANT CHANGE UP (ref 0.2–1.2)
BUN SERPL-MCNC: 22 MG/DL — SIGNIFICANT CHANGE UP (ref 7–23)
CALCIUM SERPL-MCNC: 9.3 MG/DL — SIGNIFICANT CHANGE UP (ref 8.4–10.5)
CHLORIDE SERPL-SCNC: 102 MMOL/L — SIGNIFICANT CHANGE UP (ref 96–108)
CO2 SERPL-SCNC: 21 MMOL/L — LOW (ref 22–31)
CREAT SERPL-MCNC: 1.2 MG/DL — SIGNIFICANT CHANGE UP (ref 0.5–1.3)
EGFR: 61 ML/MIN/1.73M2 — SIGNIFICANT CHANGE UP
EOSINOPHIL # BLD AUTO: 0.07 K/UL — SIGNIFICANT CHANGE UP (ref 0–0.5)
EOSINOPHIL NFR BLD AUTO: 1.1 % — SIGNIFICANT CHANGE UP (ref 0–6)
GLUCOSE SERPL-MCNC: 94 MG/DL — SIGNIFICANT CHANGE UP (ref 70–99)
HCT VFR BLD CALC: 39.2 % — SIGNIFICANT CHANGE UP (ref 39–50)
HGB BLD-MCNC: 13.4 G/DL — SIGNIFICANT CHANGE UP (ref 13–17)
IMM GRANULOCYTES NFR BLD AUTO: 0.3 % — SIGNIFICANT CHANGE UP (ref 0–0.9)
INR BLD: 1.24 RATIO — HIGH (ref 0.88–1.16)
LYMPHOCYTES # BLD AUTO: 2.25 K/UL — SIGNIFICANT CHANGE UP (ref 1–3.3)
LYMPHOCYTES # BLD AUTO: 35.9 % — SIGNIFICANT CHANGE UP (ref 13–44)
MAGNESIUM SERPL-MCNC: 2 MG/DL — SIGNIFICANT CHANGE UP (ref 1.6–2.6)
MCHC RBC-ENTMCNC: 32.4 PG — SIGNIFICANT CHANGE UP (ref 27–34)
MCHC RBC-ENTMCNC: 34.2 GM/DL — SIGNIFICANT CHANGE UP (ref 32–36)
MCV RBC AUTO: 94.7 FL — SIGNIFICANT CHANGE UP (ref 80–100)
MONOCYTES # BLD AUTO: 0.77 K/UL — SIGNIFICANT CHANGE UP (ref 0–0.9)
MONOCYTES NFR BLD AUTO: 12.3 % — SIGNIFICANT CHANGE UP (ref 2–14)
NEUTROPHILS # BLD AUTO: 3.13 K/UL — SIGNIFICANT CHANGE UP (ref 1.8–7.4)
NEUTROPHILS NFR BLD AUTO: 50.1 % — SIGNIFICANT CHANGE UP (ref 43–77)
NRBC # BLD: 0 /100 WBCS — SIGNIFICANT CHANGE UP (ref 0–0)
NT-PROBNP SERPL-SCNC: <36 PG/ML — SIGNIFICANT CHANGE UP (ref 0–300)
PLATELET # BLD AUTO: 159 K/UL — SIGNIFICANT CHANGE UP (ref 150–400)
POTASSIUM SERPL-MCNC: 4.7 MMOL/L — SIGNIFICANT CHANGE UP (ref 3.5–5.3)
POTASSIUM SERPL-SCNC: 4.7 MMOL/L — SIGNIFICANT CHANGE UP (ref 3.5–5.3)
PROT SERPL-MCNC: 7.6 G/DL — SIGNIFICANT CHANGE UP (ref 6–8.3)
PROTHROM AB SERPL-ACNC: 14.4 SEC — HIGH (ref 10.5–13.4)
RBC # BLD: 4.14 M/UL — LOW (ref 4.2–5.8)
RBC # FLD: 12.5 % — SIGNIFICANT CHANGE UP (ref 10.3–14.5)
SODIUM SERPL-SCNC: 134 MMOL/L — LOW (ref 135–145)
TROPONIN T, HIGH SENSITIVITY RESULT: 14 NG/L — SIGNIFICANT CHANGE UP (ref 0–51)
TROPONIN T, HIGH SENSITIVITY RESULT: 15 NG/L — SIGNIFICANT CHANGE UP (ref 0–51)
WBC # BLD: 6.26 K/UL — SIGNIFICANT CHANGE UP (ref 3.8–10.5)
WBC # FLD AUTO: 6.26 K/UL — SIGNIFICANT CHANGE UP (ref 3.8–10.5)

## 2023-07-01 PROCEDURE — 99285 EMERGENCY DEPT VISIT HI MDM: CPT

## 2023-07-01 PROCEDURE — 99255 IP/OBS CONSLTJ NEW/EST HI 80: CPT

## 2023-07-01 PROCEDURE — 71046 X-RAY EXAM CHEST 2 VIEWS: CPT | Mod: 26

## 2023-07-01 RX ORDER — LISINOPRIL 2.5 MG/1
10 TABLET ORAL DAILY
Refills: 0 | Status: DISCONTINUED | OUTPATIENT
Start: 2023-07-01 | End: 2023-07-04

## 2023-07-01 RX ORDER — METOPROLOL TARTRATE 50 MG
50 TABLET ORAL DAILY
Refills: 0 | Status: DISCONTINUED | OUTPATIENT
Start: 2023-07-01 | End: 2023-07-04

## 2023-07-01 RX ORDER — ASPIRIN/CALCIUM CARB/MAGNESIUM 324 MG
324 TABLET ORAL ONCE
Refills: 0 | Status: COMPLETED | OUTPATIENT
Start: 2023-07-01 | End: 2023-07-01

## 2023-07-01 RX ORDER — CLOPIDOGREL BISULFATE 75 MG/1
75 TABLET, FILM COATED ORAL DAILY
Refills: 0 | Status: DISCONTINUED | OUTPATIENT
Start: 2023-07-01 | End: 2023-07-04

## 2023-07-01 RX ORDER — ATORVASTATIN CALCIUM 80 MG/1
40 TABLET, FILM COATED ORAL AT BEDTIME
Refills: 0 | Status: DISCONTINUED | OUTPATIENT
Start: 2023-07-01 | End: 2023-07-04

## 2023-07-01 RX ORDER — ENOXAPARIN SODIUM 100 MG/ML
40 INJECTION SUBCUTANEOUS EVERY 24 HOURS
Refills: 0 | Status: DISCONTINUED | OUTPATIENT
Start: 2023-07-01 | End: 2023-07-04

## 2023-07-01 RX ORDER — ASPIRIN/CALCIUM CARB/MAGNESIUM 324 MG
81 TABLET ORAL DAILY
Refills: 0 | Status: DISCONTINUED | OUTPATIENT
Start: 2023-07-01 | End: 2023-07-04

## 2023-07-01 RX ADMIN — ATORVASTATIN CALCIUM 40 MILLIGRAM(S): 80 TABLET, FILM COATED ORAL at 21:02

## 2023-07-01 RX ADMIN — Medication 324 MILLIGRAM(S): at 16:02

## 2023-07-01 RX ADMIN — Medication 50 MILLIGRAM(S): at 21:01

## 2023-07-01 NOTE — H&P ADULT - HISTORY OF PRESENT ILLNESS
82-year-old female with past medical history of prior STEMI 2018 and 4 stents currently living in [facility] here for the next 4 weeks, not following with cardiology in [facility] presenting to emergency department with 1 month of worsening chest pain that is worse with exertion.  Has shortness of breath during episodes of chest pain with exertion.  States feels well when resting.  Patient currently taking warfarin, metoprolol, clopidogrel, atorvastatin, lisinopril that was prescribed to him after his initial MI in 2018.  Patient states has no chest pain at rest currently.  Has not taken any aspirin thus far today.  Denies fever, headache, vision changes, abdominal pain, dysuria, nausea, vomiting, diarrhea, rash, extremity edema.  Patient was admitted to [facility] 1 week ago for same symptoms and was awaiting cardiac cath however facility was unable to find [facility] hospital to send him to [facility] for bed so patient left [facility] at that time and came to [place] for further evaluation.

## 2023-07-01 NOTE — ED ADULT TRIAGE NOTE - ARRIVAL FROM
Follow-up with your primary care physician or with urology if needed.  I provided you with the name for Dr. Doty.    Take ibuprofen, 600 mg every 6 hours.  Take Flomax as prescribed.  This will help pass the kidney stone.  If you are nauseous, take Zofran as prescribed.    For breakthrough pain, you can take 1-2 Norco every 6 hours.  Do not drive or operate heavy machinery while taking this medication as it can cause significant drowsiness.  Do not drink alcohol this medication.    Return to the emergency room immediately if you get worse, do not get better, or develop new or concerning symptoms. Symptoms can include but are not limited to the following: Increased nausea, vomiting, inability keep down fluids, fever, worsening pain, inability urinate, other concerning symptoms.    Thank you for letting us take part in your care today.  
Home

## 2023-07-01 NOTE — ED ADULT NURSE NOTE - OBJECTIVE STATEMENT
83 y/o male with PMH HTN HLD MI with stents presenting to ED for chest discomfort x 1 mo associated with SOB during episodes of chest pain. pt was seen at OSH mon-thur and was admitted for stress test but was told wait time would be 2-4 weeks for a bed/ cardiac cath so pt left and went home. pt has not seen cardiology in 4 years b/c he resides in [facility]. pt reports that he has been stressed recently d/t "annoying neighbor." Upon exam pt A&Ox3 gross neuro intact, no difficulty speaking in complete sentences, s1s2 heart sounds heard, pulses x 4, hutchins x4, abdomen soft nontender nondistended, skin intact. pt denies chest pain at present, sob, ha, n/v/d, abdominal pain, f/c, urinary symptoms, hematuria.

## 2023-07-01 NOTE — CONSULT NOTE ADULT - ASSESSMENT
82 y.o Russian F with hx of HTN, HLD, STEMI 2018 s/p PCI to 99% mLAD and 99% RPDA and 80% LCx lesion, ICM HFrEF (EF 40-45%) with an apical thrombus at that time in 2018 started on coumadin, currently living in [facility] who presented to emergency department with 1 month of worsening chest pain.      82 y.o [place] F with hx of HTN, HLD, STEMI 2018 s/p PCI to 99% mLAD and 99% RPDA and 80% LCx lesion, ICM HFrEF (EF 40-45%) with an apical thrombus at that time in 2018 started on coumadin, currently living in [facility] who presented to emergency department with 1 month chest pain concerning for stable angina.     #Stable angina  Patient with substernal chest pain with exertion and emotional stress, relieved with rest in the setting of a cardiac substrate with 3vCAD s/p mLAD and 99% RPDA and 80% LCx in 2018.   Hs-Tn: 15 -> 14  ECG: Sinus rhythm with 1st degree AV block  - Continue Plavix 75mg daily  - Aspirin 81mg daily  - Toprolol 50mg daily  - Lipitor 40mg daily  - BP control: uptitrate lisinopril as needed  - Obtain TTE with echo-contrast to also rule out LV thrombus  - Can get exercise nuclear stress test   - A1C, Lipid panel    #LV thrombus Noted on TTE from 2018 after STEMI  - Patient has been on warfarin since 2018 and did not have a follow up TTE to evaluate for resolution  - TTE with echo-contrast to rule out LV thrombus  - If no evidence of LV thrombus please stop warfarin    #HFrEF (ICM) EF 40-45%  Does not appear decompensated  - Toprolol 50mg daily  - Lisinopril 10mg   - F/u repeat TTE: If LV systolic function did not recover, would aim to switch to entresto after a 72 hour washout of lisinopril     Recommendations are preliminary until attending attestation.    Indio Muñoz MD  Cardiology Fellow- PGY 4     82 y.o Singaporean M with hx of HTN, HLD, STEMI 2018 s/p PCI to 99% mLAD and 99% RPDA and 80% LCx lesion, ICM HFrEF (EF 40-45%) with an apical thrombus at that time in 2018 started on coumadin, currently living in [facility] who presented to emergency department with 1 month chest pain concerning for stable angina.     #Stable angina  Patient with substernal chest pain with exertion and emotional stress, relieved with rest in the setting of a cardiac substrate with 3vCAD s/p mLAD and 99% RPDA and 80% LCx in 2018.   Hs-Tn: 15 -> 14  ECG: Sinus rhythm with 1st degree AV block  - Continue Plavix 75mg daily  - Aspirin 81mg daily  - Toprolol 50mg daily  - Lipitor 40mg daily  - BP control: uptitrate lisinopril as needed  - Obtain TTE with echo-contrast to also rule out LV thrombus  - Can get exercise nuclear stress test   - A1C, Lipid panel    #LV thrombus Noted on TTE from 2018 after STEMI  - Patient has been on warfarin since 2018 and did not have a follow up TTE to evaluate for resolution  - TTE with echo-contrast to rule out LV thrombus  - If no evidence of LV thrombus please stop warfarin    #HFrEF (ICM) EF 40-45%  Does not appear decompensated  - Toprolol 50mg daily  - Lisinopril 10mg   - F/u repeat TTE: If LV systolic function did not recover, would aim to switch to entresto after a 72 hour washout of lisinopril     Recommendations are preliminary until attending attestation.    Indio Muñoz MD  Cardiology Fellow- PGY 4

## 2023-07-01 NOTE — CONSULT NOTE ADULT - SUBJECTIVE AND OBJECTIVE BOX
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  Has shortness of breath during episodes of chest pain with exertion.  States feels well when resting.  Patient was admitted to [facility] 1 week ago for same symptoms and was awaiting cardiac cath and patient left AMA at that time and came to [facility] for further evaluation.       PMHx:   Hypertension  Hyperlipidemia    PSHx:   H/O inguinal hernia repair    Allergies:  No Known Allergies    Home Meds:    Current Medications:   aspirin enteric coated 81 milliGRAM(s) Oral daily  atorvastatin 40 milliGRAM(s) Oral at bedtime  clopidogrel Tablet 75 milliGRAM(s) Oral daily  enoxaparin Injectable 40 milliGRAM(s) SubCutaneous every 24 hours  lisinopril 10 milliGRAM(s) Oral daily  metoprolol succinate ER 50 milliGRAM(s) Oral daily    FAMILY HISTORY:  No pertinent family history in first degree relatives    ROS:  CV: chest pain (-), palpitation (-), orthopnea (-), PND (-), edema (-)  PULM: SOB (-), cough (-), wheezing (-), hemoptysis (-).   CONST: fever (-), chills (-) or fatigue (-)  GI: abdominal distension (-), abdominal pain (-) , nausea/vomiting (-), hematemesis, (-), melena (-), hematochezia (-)  : dysuria (-), frequency (-), hematuria (-).   NEURO: numbness (-), weakness (-), dizziness (-)  SKIN: itching (-), rash (-)  HEENT:  visual changes (-); vertigo or throat pain (-);  neck stiffness (-)     Physical Exam:  T(F): 98 (07-01), Max: 98.6 (07-01)  HR: 65 (07-01) (65 - 70)  BP: 175/77 (07-01) (147/75 - 175/77)  RR: 18 (07-01)  SpO2: 100% (07-01)    GENERAL: NAD  HEAD:  Atraumatic, Normocephalic.  EYES: EOMI, PERRLA, conjunctiva and sclera clear.  NECK: Supple, No JVD.  CHEST/LUNG: Clear to auscultation bilaterally; No rales, rhonchi, wheezing, or rubs. Speaking in full sentences  HEART: Regular rate and rhythm; No murmurs, rubs, or gallops.  ABDOMEN: Bowel sounds present; Soft, Nontender, Nondistended.   EXTREMITIES:  2+ Peripheral Pulses, brisk capillary refill. No clubbing, cyanosis, or edema.  PSYCH: Normal affect.  SKIN: No rashes or lesions.    ECG: Personally reviewed    Echo: Personally reviewed  2018    EF (Visual Estimate): 45-50 %  ------------------------------------------------------------------------  Conclusions:  1. Normal mitral valve. Mild mitral regurgitation.  2. Calcified aortic valve with normal opening.  Mild-moderate aortic regurgitation.  3. Eccentric left ventricular hypertrophy (dilated left  ventricle with normal relative wall thickness).  4. Mild segmental left ventricular systolic dysfunction. A  small echodensity is seen at the left ventricular apex  (0.75cm x 0.3cm), which is suggestive of thrombus.  Endocardial visualization enhanced with intravenous  injection of echo contrast (Definity). The apical inferior  wall, and the apical anterior wall are hypokinetic. The  apical septum, and the apical lateral wall are akinetic.  5. Normal right ventricular size and function. TAPSE = 1.8  cm.  6. Estimated right ventricular systolic pressure equals 31  mm Hg, assuming right atrial pressure equals 8 mm Hg,  consistent with normal pulmonary pressures.    Cath: Personally reviewed  2018  CORONARY VESSELS: The coronary circulation is right dominant.  LM:   --  LM: Angiography showed minor luminal irregularities with no flow  limiting lesions.  LAD:   --  Mid LAD: There was a 99 % stenosis.  CX:   --  Distal circumflex: There was a 80 % stenosis.  RCA:   --  RPDA: There was a 100 % stenosis.  COMPLICATIONS: There were no complications.    CXR: Personally reviewed    Labs: Personally reviewed                        13.4   6.26  )-----------( 159      ( 01 Jul 2023 16:12 )             39.2     07-01    134<L>  |  102  |  22  ----------------------------<  94  4.7   |  21<L>  |  1.20    Ca    9.3      01 Jul 2023 16:12  Mg     2.0     07-01    TPro  7.6  /  Alb  4.0  /  TBili  0.2  /  DBili  x   /  AST  56<H>  /  ALT  69<H>  /  AlkPhos  81  07-01    PT/INR - ( 01 Jul 2023 16:12 )   PT: 14.4 sec;   INR: 1.24 ratio      PTT - ( 01 Jul 2023 16:12 )  PTT:28.4 sec    CARDIAC MARKERS ( 01 Jul 2023 17:18 )  14 ng/L / x     / x     / x     / x     / x      CARDIAC MARKERS ( 01 Jul 2023 16:12 )  15 ng/L / x     / x     / x     / x     / x     CARDIOLOGY FELLOW CONSULT NOTE    HPI:  82 y.o [place] F with hx of HTN, HLD, STEMI 2018 s/p PCI to 99% mLAD and 99% RPDA and 80% LCx lesion, ICM HFrEF (EF 40-45%) with an apical thrombus at that time in 2018 started on coumadin, currently living in [facility] who presented to emergency department with 1 month of progressive chest pain that is worse with exertion. Pain is pressure like and occassionally radiates to his left shoulder, with associated mild SOB and palpitations and pain is relieved with rest. He denies associated nausea, vomiting, lightheadedness, syncope, diaphoresis or pain at rest. Patient was admitted to [facility] 1 week ago for same symptoms and was awaiting cardiac cath and patient left AMA at that time and came to [place] for further evaluation. He is currently chest pain free      PMHx:   Hypertension  Hyperlipidemia    PSHx:   H/O inguinal hernia repair    Allergies:  No Known Allergies    Home Meds:    Current Medications:   aspirin enteric coated 81 milliGRAM(s) Oral daily  atorvastatin 40 milliGRAM(s) Oral at bedtime  clopidogrel Tablet 75 milliGRAM(s) Oral daily  enoxaparin Injectable 40 milliGRAM(s) SubCutaneous every 24 hours  lisinopril 10 milliGRAM(s) Oral daily  metoprolol succinate ER 50 milliGRAM(s) Oral daily    FAMILY HISTORY:  No pertinent family history in first degree relatives    ROS:  CV: chest pain (-), palpitation (-), orthopnea (-), PND (-), edema (-)  PULM: SOB (-), cough (-), wheezing (-), hemoptysis (-).   CONST: fever (-), chills (-) or fatigue (-)  GI: abdominal distension (-), abdominal pain (-) , nausea/vomiting (-), hematemesis, (-), melena (-), hematochezia (-)  : dysuria (-), frequency (-), hematuria (-).   NEURO: numbness (-), weakness (-), dizziness (-)  SKIN: itching (-), rash (-)  HEENT:  visual changes (-); vertigo or throat pain (-);  neck stiffness (-)     Physical Exam:  T(F): 98 (07-01), Max: 98.6 (07-01)  HR: 65 (07-01) (65 - 70)  BP: 175/77 (07-01) (147/75 - 175/77)  RR: 18 (07-01)  SpO2: 100% (07-01)    GENERAL: NAD  HEAD:  Atraumatic, Normocephalic.  EYES: EOMI, PERRLA, conjunctiva and sclera clear.  NECK: Supple, No JVD.  CHEST/LUNG: Clear to auscultation bilaterally; No rales, rhonchi, wheezing, or rubs. Speaking in full sentences  HEART: Regular rate and rhythm; No murmurs, rubs, or gallops.  ABDOMEN: Bowel sounds present; Soft, Nontender, Nondistended.   EXTREMITIES:  2+ Peripheral Pulses, brisk capillary refill. No clubbing, cyanosis, or edema.  PSYCH: Normal affect.  SKIN: No rashes or lesions.    ECG: Personally reviewed    Echo: Personally reviewed  2018    EF (Visual Estimate): 45-50 %  ------------------------------------------------------------------------  Conclusions:  1. Normal mitral valve. Mild mitral regurgitation.  2. Calcified aortic valve with normal opening.  Mild-moderate aortic regurgitation.  3. Eccentric left ventricular hypertrophy (dilated left  ventricle with normal relative wall thickness).  4. Mild segmental left ventricular systolic dysfunction. A  small echodensity is seen at the left ventricular apex  (0.75cm x 0.3cm), which is suggestive of thrombus.  Endocardial visualization enhanced with intravenous  injection of echo contrast (Definity). The apical inferior  wall, and the apical anterior wall are hypokinetic. The  apical septum, and the apical lateral wall are akinetic.  5. Normal right ventricular size and function. TAPSE = 1.8  cm.  6. Estimated right ventricular systolic pressure equals 31  mm Hg, assuming right atrial pressure equals 8 mm Hg,  consistent with normal pulmonary pressures.    Cath: Personally reviewed  2018  CORONARY VESSELS: The coronary circulation is right dominant.  LM:   --  LM: Angiography showed minor luminal irregularities with no flow  limiting lesions.  LAD:   --  Mid LAD: There was a 99 % stenosis.  CX:   --  Distal circumflex: There was a 80 % stenosis.  RCA:   --  RPDA: There was a 100 % stenosis.  COMPLICATIONS: There were no complications.    CXR: Personally reviewed    Labs: Personally reviewed                        13.4   6.26  )-----------( 159      ( 01 Jul 2023 16:12 )             39.2     07-01    134<L>  |  102  |  22  ----------------------------<  94  4.7   |  21<L>  |  1.20    Ca    9.3      01 Jul 2023 16:12  Mg     2.0     07-01    TPro  7.6  /  Alb  4.0  /  TBili  0.2  /  DBili  x   /  AST  56<H>  /  ALT  69<H>  /  AlkPhos  81  07-01    PT/INR - ( 01 Jul 2023 16:12 )   PT: 14.4 sec;   INR: 1.24 ratio      PTT - ( 01 Jul 2023 16:12 )  PTT:28.4 sec    CARDIAC MARKERS ( 01 Jul 2023 17:18 )  14 ng/L / x     / x     / x     / x     / x      CARDIAC MARKERS ( 01 Jul 2023 16:12 )  15 ng/L / x     / x     / x     / x     / x     CARDIOLOGY FELLOW CONSULT NOTE    HPI:  82 y.o Filipino M with hx of HTN, HLD, STEMI 2018 s/p PCI to 99% mLAD and 99% RPDA and 80% LCx lesion, ICM HFrEF (EF 40-45%) with an apical thrombus at that time in 2018 started on coumadin, currently living in [facility] who presented to emergency department with 1 month of progressive chest pain that is worse with exertion. Pain is pressure like and occassionally radiates to his left shoulder, with associated mild SOB and palpitations and pain is relieved with rest. He denies associated nausea, vomiting, lightheadedness, syncope, diaphoresis or pain at rest. Patient was admitted to [facility] 1 week ago for same symptoms and was awaiting cardiac cath and patient left AMA at that time and came to [place] for further evaluation. He is currently chest pain free      PMHx:   Hypertension  Hyperlipidemia    PSHx:   H/O inguinal hernia repair    Allergies:  No Known Allergies    Home Meds:    Current Medications:   aspirin enteric coated 81 milliGRAM(s) Oral daily  atorvastatin 40 milliGRAM(s) Oral at bedtime  clopidogrel Tablet 75 milliGRAM(s) Oral daily  enoxaparin Injectable 40 milliGRAM(s) SubCutaneous every 24 hours  lisinopril 10 milliGRAM(s) Oral daily  metoprolol succinate ER 50 milliGRAM(s) Oral daily    FAMILY HISTORY:  No pertinent family history in first degree relatives    ROS:  CV: chest pain (-), palpitation (-), orthopnea (-), PND (-), edema (-)  PULM: SOB (-), cough (-), wheezing (-), hemoptysis (-).   CONST: fever (-), chills (-) or fatigue (-)  GI: abdominal distension (-), abdominal pain (-) , nausea/vomiting (-), hematemesis, (-), melena (-), hematochezia (-)  : dysuria (-), frequency (-), hematuria (-).   NEURO: numbness (-), weakness (-), dizziness (-)  SKIN: itching (-), rash (-)  HEENT:  visual changes (-); vertigo or throat pain (-);  neck stiffness (-)     Physical Exam:  T(F): 98 (07-01), Max: 98.6 (07-01)  HR: 65 (07-01) (65 - 70)  BP: 175/77 (07-01) (147/75 - 175/77)  RR: 18 (07-01)  SpO2: 100% (07-01)    GENERAL: NAD  HEAD:  Atraumatic, Normocephalic.  EYES: EOMI, PERRLA, conjunctiva and sclera clear.  NECK: Supple, No JVD.  CHEST/LUNG: Clear to auscultation bilaterally; No rales, rhonchi, wheezing, or rubs. Speaking in full sentences  HEART: Regular rate and rhythm; No murmurs, rubs, or gallops.  ABDOMEN: Bowel sounds present; Soft, Nontender, Nondistended.   EXTREMITIES:  2+ Peripheral Pulses, brisk capillary refill. No clubbing, cyanosis, or edema.  PSYCH: Normal affect.  SKIN: No rashes or lesions.    ECG: Personally reviewed    Echo: Personally reviewed  2018    EF (Visual Estimate): 45-50 %  ------------------------------------------------------------------------  Conclusions:  1. Normal mitral valve. Mild mitral regurgitation.  2. Calcified aortic valve with normal opening.  Mild-moderate aortic regurgitation.  3. Eccentric left ventricular hypertrophy (dilated left  ventricle with normal relative wall thickness).  4. Mild segmental left ventricular systolic dysfunction. A  small echodensity is seen at the left ventricular apex  (0.75cm x 0.3cm), which is suggestive of thrombus.  Endocardial visualization enhanced with intravenous  injection of echo contrast (Definity). The apical inferior  wall, and the apical anterior wall are hypokinetic. The  apical septum, and the apical lateral wall are akinetic.  5. Normal right ventricular size and function. TAPSE = 1.8  cm.  6. Estimated right ventricular systolic pressure equals 31  mm Hg, assuming right atrial pressure equals 8 mm Hg,  consistent with normal pulmonary pressures.    Cath: Personally reviewed  2018  CORONARY VESSELS: The coronary circulation is right dominant.  LM:   --  LM: Angiography showed minor luminal irregularities with no flow  limiting lesions.  LAD:   --  Mid LAD: There was a 99 % stenosis.  CX:   --  Distal circumflex: There was a 80 % stenosis.  RCA:   --  RPDA: There was a 100 % stenosis.  COMPLICATIONS: There were no complications.    CXR: Personally reviewed    Labs: Personally reviewed                        13.4   6.26  )-----------( 159      ( 01 Jul 2023 16:12 )             39.2     07-01    134<L>  |  102  |  22  ----------------------------<  94  4.7   |  21<L>  |  1.20    Ca    9.3      01 Jul 2023 16:12  Mg     2.0     07-01    TPro  7.6  /  Alb  4.0  /  TBili  0.2  /  DBili  x   /  AST  56<H>  /  ALT  69<H>  /  AlkPhos  81  07-01    PT/INR - ( 01 Jul 2023 16:12 )   PT: 14.4 sec;   INR: 1.24 ratio      PTT - ( 01 Jul 2023 16:12 )  PTT:28.4 sec    CARDIAC MARKERS ( 01 Jul 2023 17:18 )  14 ng/L / x     / x     / x     / x     / x      CARDIAC MARKERS ( 01 Jul 2023 16:12 )  15 ng/L / x     / x     / x     / x     / x

## 2023-07-01 NOTE — H&P ADULT - NSHPLABSRESULTS_GEN_ALL_CORE
13.4   6.26  )-----------( 159      ( 01 Jul 2023 16:12 )             39.2   07-01    134<L>  |  102  |  22  ----------------------------<  94  4.7   |  21<L>  |  1.20    Ca    9.3      01 Jul 2023 16:12  Mg     2.0     07-01    TPro  7.6  /  Alb  4.0  /  TBili  0.2  /  DBili  x   /  AST  56<H>  /  ALT  69<H>  /  AlkPhos  81  07-01

## 2023-07-01 NOTE — PATIENT PROFILE ADULT - FALL HARM RISK - UNIVERSAL INTERVENTIONS
Bed in lowest position, wheels locked, appropriate side rails in place/Call bell, personal items and telephone in reach/Instruct patient to call for assistance before getting out of bed or chair/Non-slip footwear when patient is out of bed/[place] to call system/Physically safe environment - no spills, clutter or unnecessary equipment/Purposeful Proactive Rounding/Room/bathroom lighting operational, light cord in reach

## 2023-07-01 NOTE — ED PROVIDER NOTE - ATTENDING CONTRIBUTION TO CARE
------------ATTENDING NOTE------------  pt w/ daughter c/o 1 month of increasing episodes of chest pain, describes onset of mild/moderate dull ache/pressure across chest w/ exertion, radiates at times to L shoulder, feels sob, improves w/ resting, lasting up to 30 minutes, complicated as known CAD, c/w Unstable Angina, no current symptoms at rest, complicated as recent AMA from hospital prior to diagnostic cardiac cath, awaiting labs/imaging and close reassessments -->  - Raul Ferreira MD   ---------------------------------------------

## 2023-07-01 NOTE — ED ADULT NURSE NOTE - NSFALLUNIVINTERV_ED_ALL_ED
Bed/Stretcher in lowest position, wheels locked, appropriate side rails in place/Call bell, personal items and telephone in reach/Instruct patient to call for assistance before getting out of bed/chair/stretcher/Non-slip footwear applied when patient is off stretcher/[place] to call system/Physically safe environment - no spills, clutter or unnecessary equipment/Purposeful proactive rounding/Room/bathroom lighting operational, light cord in reach

## 2023-07-01 NOTE — ED PROVIDER NOTE - NSICDXPASTSURGICALHX_GEN_ALL_CORE_FT
PAST SURGICAL HISTORY:  H/O inguinal hernia repair Right Inguinal Hernia x2  First in 1966'  Second time with mesh in 2010

## 2023-07-01 NOTE — ED PROVIDER NOTE - OBJECTIVE STATEMENT
82-year-old female with past medical history of prior STEMI 2018 and 4 stents currently living in [facility] here for the next 4 weeks, not following with cardiology in [facility] presenting to emergency department with 1 month of worsening chest pain that is worse with exertion.  Has shortness of breath during episodes of chest pain with exertion.  States feels well when resting.  Patient currently taking warfarin, metoprolol, clopidogrel, atorvastatin, lisinopril that was prescribed to him after his initial MI in 2018.  Patient states has no chest pain at rest currently.  Has not taken any aspirin thus far today.  Denies fever, headache, vision changes, abdominal pain, dysuria, nausea, vomiting, diarrhea, rash, extremity edema.  Patient was admitted to [facility] 1 week ago for same symptoms and was awaiting cardiac cath however facility was unable to find [facility] hospital to send him to [facility] for bed so patient left [facility] at that time and came to [place] for further evaluation.

## 2023-07-01 NOTE — H&P ADULT - NSHPPHYSICALEXAM_GEN_ALL_CORE
Vital Signs Last 24 Hrs  T(C): 37 (01 Jul 2023 14:22), Max: 37 (01 Jul 2023 14:22)  T(F): 98.6 (01 Jul 2023 14:22), Max: 98.6 (01 Jul 2023 14:22)  HR: 66 (01 Jul 2023 18:46) (66 - 70)  BP: 158/76 (01 Jul 2023 18:46) (147/75 - 158/76)  BP(mean): --  RR: 18 (01 Jul 2023 18:46) (17 - 18)  SpO2: 100% (01 Jul 2023 18:46) (100% - 100%)    Parameters below as of 01 Jul 2023 18:46  Patient On (Oxygen Delivery Method): room air Vital Signs Last 24 Hrs  T(C): 37 (01 Jul 2023 14:22), Max: 37 (01 Jul 2023 14:22)  T(F): 98.6 (01 Jul 2023 14:22), Max: 98.6 (01 Jul 2023 14:22)  HR: 66 (01 Jul 2023 18:46) (66 - 70)  BP: 158/76 (01 Jul 2023 18:46) (147/75 - 158/76)  BP(mean): --  RR: 18 (01 Jul 2023 18:46) (17 - 18)  SpO2: 100% (01 Jul 2023 18:46) (100% - 100%)    Parameters below as of 01 Jul 2023 18:46  Patient On (Oxygen Delivery Method): room air    heent : nc/at, no pallor  neck : supple, no JVD  lungs : B/L fair A/E, no w/r/r  heart: s1s2 nml  abd : soft, NABS, NT/ND  ext : no e/c/c, pulses 2 +  neuro: aaox3 , no focal deficit

## 2023-07-01 NOTE — CONSULT NOTE ADULT - ATTENDING COMMENTS
Agree with plan as outlined above.  Ischemic CM; multiple stents; concerning anginal symptoms  plan for [facility]                                                                                            Eighty minutes spent in direct patient care and communication

## 2023-07-01 NOTE — H&P ADULT - ASSESSMENT
A/P    Chest pain / unstable angina   please consult house cardiology   may need cardiac cath     Hx of CAD s/p stent :  -on coumadin / lavix and asa   not sure why he is on coumadin , no hx of afib or PE or DVT   will hold off on coumadin   c/w asa/plavix     HTN / HLD  -c/w toprol and lisinopril   start statin

## 2023-07-01 NOTE — ED PROVIDER NOTE - PROGRESS NOTE DETAILS
Jaylen Tamez MD, PGY2  Trop 15 will repeat. Will admit to tele for unstable angina. Discussed with unattached hospitalist Dr. Fairbanks who accepted patient. Discussed with patient who is in agreement with plan.

## 2023-07-02 LAB
A1C WITH ESTIMATED AVERAGE GLUCOSE RESULT: 5.4 % — SIGNIFICANT CHANGE UP (ref 4–5.6)
ALBUMIN SERPL ELPH-MCNC: 3.9 G/DL — SIGNIFICANT CHANGE UP (ref 3.3–5)
ALP SERPL-CCNC: 79 U/L — SIGNIFICANT CHANGE UP (ref 40–120)
ALT FLD-CCNC: 61 U/L — HIGH (ref 10–45)
ANION GAP SERPL CALC-SCNC: 9 MMOL/L — SIGNIFICANT CHANGE UP (ref 5–17)
AST SERPL-CCNC: 45 U/L — HIGH (ref 10–40)
BILIRUB SERPL-MCNC: 0.3 MG/DL — SIGNIFICANT CHANGE UP (ref 0.2–1.2)
BUN SERPL-MCNC: 21 MG/DL — SIGNIFICANT CHANGE UP (ref 7–23)
CALCIUM SERPL-MCNC: 9.3 MG/DL — SIGNIFICANT CHANGE UP (ref 8.4–10.5)
CHLORIDE SERPL-SCNC: 102 MMOL/L — SIGNIFICANT CHANGE UP (ref 96–108)
CHOLEST SERPL-MCNC: 101 MG/DL — SIGNIFICANT CHANGE UP
CO2 SERPL-SCNC: 24 MMOL/L — SIGNIFICANT CHANGE UP (ref 22–31)
CREAT SERPL-MCNC: 1.14 MG/DL — SIGNIFICANT CHANGE UP (ref 0.5–1.3)
EGFR: 65 ML/MIN/1.73M2 — SIGNIFICANT CHANGE UP
ESTIMATED AVERAGE GLUCOSE: 108 MG/DL — SIGNIFICANT CHANGE UP (ref 68–114)
GLUCOSE SERPL-MCNC: 92 MG/DL — SIGNIFICANT CHANGE UP (ref 70–99)
HCT VFR BLD CALC: 40 % — SIGNIFICANT CHANGE UP (ref 39–50)
HDLC SERPL-MCNC: 42 MG/DL — SIGNIFICANT CHANGE UP
HGB BLD-MCNC: 13.5 G/DL — SIGNIFICANT CHANGE UP (ref 13–17)
LIPID PNL WITH DIRECT LDL SERPL: 47 MG/DL — SIGNIFICANT CHANGE UP
MCHC RBC-ENTMCNC: 32.2 PG — SIGNIFICANT CHANGE UP (ref 27–34)
MCHC RBC-ENTMCNC: 33.8 GM/DL — SIGNIFICANT CHANGE UP (ref 32–36)
MCV RBC AUTO: 95.5 FL — SIGNIFICANT CHANGE UP (ref 80–100)
MRSA PCR RESULT.: SIGNIFICANT CHANGE UP
NON HDL CHOLESTEROL: 60 MG/DL — SIGNIFICANT CHANGE UP
NRBC # BLD: 0 /100 WBCS — SIGNIFICANT CHANGE UP (ref 0–0)
PLATELET # BLD AUTO: 152 K/UL — SIGNIFICANT CHANGE UP (ref 150–400)
POTASSIUM SERPL-MCNC: 4.8 MMOL/L — SIGNIFICANT CHANGE UP (ref 3.5–5.3)
POTASSIUM SERPL-SCNC: 4.8 MMOL/L — SIGNIFICANT CHANGE UP (ref 3.5–5.3)
PROT SERPL-MCNC: 7.3 G/DL — SIGNIFICANT CHANGE UP (ref 6–8.3)
RBC # BLD: 4.19 M/UL — LOW (ref 4.2–5.8)
RBC # FLD: 12.5 % — SIGNIFICANT CHANGE UP (ref 10.3–14.5)
S AUREUS DNA NOSE QL NAA+PROBE: SIGNIFICANT CHANGE UP
SODIUM SERPL-SCNC: 135 MMOL/L — SIGNIFICANT CHANGE UP (ref 135–145)
TRIGL SERPL-MCNC: 64 MG/DL — SIGNIFICANT CHANGE UP
TSH SERPL-MCNC: 3.38 UIU/ML — SIGNIFICANT CHANGE UP (ref 0.27–4.2)
WBC # BLD: 6.65 K/UL — SIGNIFICANT CHANGE UP (ref 3.8–10.5)
WBC # FLD AUTO: 6.65 K/UL — SIGNIFICANT CHANGE UP (ref 3.8–10.5)

## 2023-07-02 PROCEDURE — 99233 SBSQ HOSP IP/OBS HIGH 50: CPT

## 2023-07-02 RX ORDER — POLYETHYLENE GLYCOL 3350 17 G/17G
17 POWDER, FOR SOLUTION ORAL DAILY
Refills: 0 | Status: DISCONTINUED | OUTPATIENT
Start: 2023-07-02 | End: 2023-07-04

## 2023-07-02 RX ORDER — CHLORHEXIDINE GLUCONATE 213 G/1000ML
1 SOLUTION TOPICAL DAILY
Refills: 0 | Status: DISCONTINUED | OUTPATIENT
Start: 2023-07-02 | End: 2023-07-04

## 2023-07-02 RX ADMIN — ATORVASTATIN CALCIUM 40 MILLIGRAM(S): 80 TABLET, FILM COATED ORAL at 21:48

## 2023-07-02 RX ADMIN — ENOXAPARIN SODIUM 40 MILLIGRAM(S): 100 INJECTION SUBCUTANEOUS at 05:38

## 2023-07-02 RX ADMIN — Medication 81 MILLIGRAM(S): at 11:04

## 2023-07-02 RX ADMIN — Medication 50 MILLIGRAM(S): at 05:37

## 2023-07-02 RX ADMIN — CHLORHEXIDINE GLUCONATE 1 APPLICATION(S): 213 SOLUTION TOPICAL at 11:05

## 2023-07-02 RX ADMIN — CLOPIDOGREL BISULFATE 75 MILLIGRAM(S): 75 TABLET, FILM COATED ORAL at 11:04

## 2023-07-02 RX ADMIN — LISINOPRIL 10 MILLIGRAM(S): 2.5 TABLET ORAL at 05:39

## 2023-07-02 NOTE — CHART NOTE - NSCHARTNOTEFT_GEN_A_CORE
spoke to Dr Ross   given patient's extensive cardiac history and presentation, NST to be cancelled   pt to go for [facility] today instead

## 2023-07-02 NOTE — PROGRESS NOTE ADULT - ASSESSMENT
A/P    Chest pain / unstable angina   seen by cardio   scheduled for [facility] in am     Hx of LV thrombus   -was on coumadin   hold for now   repeat TTE    Hx of CAD s/p stent :  -on coumadin / lavix and asa   will hold off on coumadin   c/w asa/plavix     HTN / HLD  -c/w toprol and lisinopril   start statin     dispo: [facility] in am

## 2023-07-02 NOTE — PROGRESS NOTE ADULT - SUBJECTIVE AND OBJECTIVE BOX
HPI:  82 y.o And[place] M with hx of HTN, HLD, STEMI 2018 s/p PCI to 99% mLAD and 99% RPDA and 80% LCx lesion, ICM HFrEF (EF 40-45%) with an apical thrombus at that time in 2018 started on coumadin, currently living in [facility] who presented to emergency department with 1 month of progressive chest pain that is worse with exertion. Pain is pressure like and occassionally radiates to his left shoulder, with associated mild SOB and palpitations and pain is relieved with rest. He denies associated nausea, vomiting, lightheadedness, syncope, diaphoresis or pain at rest. Patient was admitted to [facility] 1 week ago for same symptoms and was awaiting cardiac cath and patient left AMA at that time and came to [place] for further evaluation. He is currently chest pain free    ===========================  Interval Events  -   -   - No reported worsening CP/Palps/SOB\  ===========================        PMHx:   Hypertension  Hyperlipidemia    PSHx:   H/O inguinal hernia repair    Allergies:  No Known Allergies    Home Meds:    Current Medications:   aspirin enteric coated 81 milliGRAM(s) Oral daily  atorvastatin 40 milliGRAM(s) Oral at bedtime  clopidogrel Tablet 75 milliGRAM(s) Oral daily  enoxaparin Injectable 40 milliGRAM(s) SubCutaneous every 24 hours  lisinopril 10 milliGRAM(s) Oral daily  metoprolol succinate ER 50 milliGRAM(s) Oral daily    FAMILY HISTORY:  No pertinent family history in first degree relatives    ROS:  CV: chest pain (-), palpitation (-), orthopnea (-), PND (-), edema (-)  PULM: SOB (-), cough (-), wheezing (-), hemoptysis (-).   CONST: fever (-), chills (-) or fatigue (-)  GI: abdominal distension (-), abdominal pain (-) , nausea/vomiting (-), hematemesis, (-), melena (-), hematochezia (-)  : dysuria (-), frequency (-), hematuria (-).   NEURO: numbness (-), weakness (-), dizziness (-)  SKIN: itching (-), rash (-)  HEENT:  visual changes (-); vertigo or throat pain (-);  neck stiffness (-)     Physical Exam:  T(F): 98 (07-01), Max: 98.6 (07-01)  HR: 65 (07-01) (65 - 70)  BP: 175/77 (07-01) (147/75 - 175/77)  RR: 18 (07-01)  SpO2: 100% (07-01)    GENERAL: NAD  HEAD:  Atraumatic, Normocephalic.  EYES: EOMI, PERRLA, conjunctiva and sclera clear.  NECK: Supple, No JVD.  CHEST/LUNG: Clear to auscultation bilaterally; No rales, rhonchi, wheezing, or rubs. Speaking in full sentences  HEART: Regular rate and rhythm; No murmurs, rubs, or gallops.  ABDOMEN: Bowel sounds present; Soft, Nontender, Nondistended.   EXTREMITIES:  2+ Peripheral Pulses, brisk capillary refill. No clubbing, cyanosis, or edema.  PSYCH: Normal affect.  SKIN: No rashes or lesions.    ECG: Personally reviewed    Echo: Personally reviewed  2018    EF (Visual Estimate): 45-50 %  ------------------------------------------------------------------------  Conclusions:  1. Normal mitral valve. Mild mitral regurgitation.  2. Calcified aortic valve with normal opening.  Mild-moderate aortic regurgitation.  3. Eccentric left ventricular hypertrophy (dilated left  ventricle with normal relative wall thickness).  4. Mild segmental left ventricular systolic dysfunction. A  small echodensity is seen at the left ventricular apex  (0.75cm x 0.3cm), which is suggestive of thrombus.  Endocardial visualization enhanced with intravenous  injection of echo contrast (Definity). The apical inferior  wall, and the apical anterior wall are hypokinetic. The  apical septum, and the apical lateral wall are akinetic.  5. Normal right ventricular size and function. TAPSE = 1.8  cm.  6. Estimated right ventricular systolic pressure equals 31  mm Hg, assuming right atrial pressure equals 8 mm Hg,  consistent with normal pulmonary pressures.    Cath: Personally reviewed  2018  CORONARY VESSELS: The coronary circulation is right dominant.  LM:   --  LM: Angiography showed minor luminal irregularities with no flow  limiting lesions.  LAD:   --  Mid LAD: There was a 99 % stenosis.  CX:   --  Distal circumflex: There was a 80 % stenosis.  RCA:   --  RPDA: There was a 100 % stenosis.  COMPLICATIONS: There were no complications.    CXR: Personally reviewed    Labs: Personally reviewed                        13.4   6.26  )-----------( 159      ( 01 Jul 2023 16:12 )             39.2     07-01    134<L>  |  102  |  22  ----------------------------<  94  4.7   |  21<L>  |  1.20    Ca    9.3      01 Jul 2023 16:12  Mg     2.0     07-01    TPro  7.6  /  Alb  4.0  /  TBili  0.2  /  DBili  x   /  AST  56<H>  /  ALT  69<H>  /  AlkPhos  81  07-01    PT/INR - ( 01 Jul 2023 16:12 )   PT: 14.4 sec;   INR: 1.24 ratio      PTT - ( 01 Jul 2023 16:12 )  PTT:28.4 sec    CARDIAC MARKERS ( 01 Jul 2023 17:18 )  14 ng/L / x     / x     / x     / x     / x      CARDIAC MARKERS ( 01 Jul 2023 16:12 )  15 ng/L / x     / x     / x     / x     / x          Assessment and Recommendation:   · Assessment	  82 y.o [place] M with hx of HTN, HLD, STEMI 2018 s/p PCI to 99% mLAD and 99% RPDA and 80% LCx lesion, ICM HFrEF (EF 40-45%) with an apical thrombus at that time in 2018 started on coumadin, currently living in [facility] who presented to emergency department with 1 month chest pain concerning for stable angina.     #Stable angina  Patient with substernal chest pain with exertion and emotional stress, relieved with rest in the setting of a cardiac substrate with 3vCAD s/p mLAD and 99% RPDA and 80% LCx in 2018.   Hs-Tn: 15 -> 14  ECG: Sinus rhythm with 1st degree AV block    Impression:  Unstable Angina    Plan  [facility]   - Continue Plavix 75mg daily  - Aspirin 81mg daily  - Toprolol 50mg daily  - Lipitor 40mg daily  - BP control: uptitrate lisinopril as needed  - Obtain TTE with echo-contrast to also rule out LV thrombus  - Can get exercise nuclear stress test   - A1C, Lipid panel    #LV thrombus Noted on TTE from 2018 after STEMI  - Patient has been on warfarin since 2018 and did not have a follow up TTE to evaluate for resolution  - TTE with echo-contrast to rule out LV thrombus  - stop warfarin    #HFrEF (ICM) EF 40-45%  Does not appear decompensated  - Toprolol 50mg daily  - Lisinopril 10mg   - F/u repeat TTE: If LV systolic function did not recover, would aim to switch to entresto after a 72 hour washout of lisinopril     CLEMENTINA Ross                                                                                                                                                                                                                                      Forty-1 minutes spent in patient care management

## 2023-07-03 ENCOUNTER — TRANSCRIPTION ENCOUNTER (OUTPATIENT)
Age: 83
End: 2023-07-03

## 2023-07-03 DIAGNOSIS — I21.3 ST ELEVATION (STEMI) MYOCARDIAL INFARCTION OF UNSPECIFIED SITE: ICD-10-CM

## 2023-07-03 DIAGNOSIS — I23.6 THROMBOSIS OF ATRIUM, AURICULAR APPENDAGE, AND VENTRICLE AS CURRENT COMPLICATIONS FOLLOWING ACUTE MYOCARDIAL INFARCTION: ICD-10-CM

## 2023-07-03 DIAGNOSIS — I25.10 ATHEROSCLEROTIC HEART DISEASE OF NATIVE CORONARY ARTERY WITHOUT ANGINA PECTORIS: ICD-10-CM

## 2023-07-03 LAB
ALBUMIN SERPL ELPH-MCNC: 3.8 G/DL — SIGNIFICANT CHANGE UP (ref 3.3–5)
ALP SERPL-CCNC: 74 U/L — SIGNIFICANT CHANGE UP (ref 40–120)
ALT FLD-CCNC: 45 U/L — SIGNIFICANT CHANGE UP (ref 10–45)
ANION GAP SERPL CALC-SCNC: 11 MMOL/L — SIGNIFICANT CHANGE UP (ref 5–17)
AST SERPL-CCNC: 30 U/L — SIGNIFICANT CHANGE UP (ref 10–40)
BILIRUB SERPL-MCNC: 0.5 MG/DL — SIGNIFICANT CHANGE UP (ref 0.2–1.2)
BUN SERPL-MCNC: 24 MG/DL — HIGH (ref 7–23)
CALCIUM SERPL-MCNC: 9.1 MG/DL — SIGNIFICANT CHANGE UP (ref 8.4–10.5)
CHLORIDE SERPL-SCNC: 102 MMOL/L — SIGNIFICANT CHANGE UP (ref 96–108)
CO2 SERPL-SCNC: 23 MMOL/L — SIGNIFICANT CHANGE UP (ref 22–31)
CREAT SERPL-MCNC: 1.15 MG/DL — SIGNIFICANT CHANGE UP (ref 0.5–1.3)
EGFR: 64 ML/MIN/1.73M2 — SIGNIFICANT CHANGE UP
GLUCOSE SERPL-MCNC: 84 MG/DL — SIGNIFICANT CHANGE UP (ref 70–99)
POTASSIUM SERPL-MCNC: 4.7 MMOL/L — SIGNIFICANT CHANGE UP (ref 3.5–5.3)
POTASSIUM SERPL-SCNC: 4.7 MMOL/L — SIGNIFICANT CHANGE UP (ref 3.5–5.3)
PROT SERPL-MCNC: 6.9 G/DL — SIGNIFICANT CHANGE UP (ref 6–8.3)
SODIUM SERPL-SCNC: 136 MMOL/L — SIGNIFICANT CHANGE UP (ref 135–145)

## 2023-07-03 PROCEDURE — 99233 SBSQ HOSP IP/OBS HIGH 50: CPT

## 2023-07-03 PROCEDURE — 76376 3D RENDER W/INTRP POSTPROCES: CPT | Mod: 26

## 2023-07-03 PROCEDURE — 93306 TTE W/DOPPLER COMPLETE: CPT | Mod: 26

## 2023-07-03 PROCEDURE — 99152 MOD SED SAME PHYS/QHP 5/>YRS: CPT

## 2023-07-03 PROCEDURE — 93454 CORONARY ARTERY ANGIO S&I: CPT | Mod: 26

## 2023-07-03 RX ORDER — SODIUM CHLORIDE 9 MG/ML
100 INJECTION INTRAMUSCULAR; INTRAVENOUS; SUBCUTANEOUS
Refills: 0 | Status: DISCONTINUED | OUTPATIENT
Start: 2023-07-03 | End: 2023-07-04

## 2023-07-03 RX ORDER — SODIUM CHLORIDE 9 MG/ML
250 INJECTION INTRAMUSCULAR; INTRAVENOUS; SUBCUTANEOUS ONCE
Refills: 0 | Status: COMPLETED | OUTPATIENT
Start: 2023-07-03 | End: 2023-07-03

## 2023-07-03 RX ADMIN — CLOPIDOGREL BISULFATE 75 MILLIGRAM(S): 75 TABLET, FILM COATED ORAL at 08:24

## 2023-07-03 RX ADMIN — ATORVASTATIN CALCIUM 40 MILLIGRAM(S): 80 TABLET, FILM COATED ORAL at 22:48

## 2023-07-03 RX ADMIN — LISINOPRIL 10 MILLIGRAM(S): 2.5 TABLET ORAL at 05:03

## 2023-07-03 RX ADMIN — Medication 81 MILLIGRAM(S): at 08:23

## 2023-07-03 RX ADMIN — CHLORHEXIDINE GLUCONATE 1 APPLICATION(S): 213 SOLUTION TOPICAL at 11:35

## 2023-07-03 RX ADMIN — POLYETHYLENE GLYCOL 3350 17 GRAM(S): 17 POWDER, FOR SOLUTION ORAL at 11:34

## 2023-07-03 RX ADMIN — ENOXAPARIN SODIUM 40 MILLIGRAM(S): 100 INJECTION SUBCUTANEOUS at 05:03

## 2023-07-03 RX ADMIN — Medication 50 MILLIGRAM(S): at 05:03

## 2023-07-03 NOTE — PROGRESS NOTE ADULT - SUBJECTIVE AND OBJECTIVE BOX
ID: 82 y.o Ruben KELLY with hx of HTN, HLD, STEMI 2018 s/p PCI to 99% mLAD and 99% RPDA and 80% LCx lesion, ICM HFrEF (EF 40-45%) with an apical thrombus at that time in 2018 started on coumadin, currently living in [facility] who presented to emergency department with 1 month of progressive chest pain that is worse with exertion. Pain is pressure like and occassionally radiates to his left shoulder, with associated mild SOB and palpitations and pain is relieved with rest. He denies associated nausea, vomiting, lightheadedness, syncope, diaphoresis or pain at rest. Patient was admitted to [facility] 1 week ago for same symptoms and was awaiting cardiac cath and patient left AMA at that time and came to [place] for further evaluation. He is currently chest pain free      Patient seen and examined at bedside.    Overnight Events: *****INCOMPLETE NOTE    Telemetry:         Current Meds:  aspirin enteric coated 81 milliGRAM(s) Oral daily  atorvastatin 40 milliGRAM(s) Oral at bedtime  bisacodyl 5 milliGRAM(s) Oral daily  chlorhexidine 2% Cloths 1 Application(s) Topical daily  clopidogrel Tablet 75 milliGRAM(s) Oral daily  enoxaparin Injectable 40 milliGRAM(s) SubCutaneous every 24 hours  lisinopril 10 milliGRAM(s) Oral daily  metoprolol succinate ER 50 milliGRAM(s) Oral daily  polyethylene glycol 3350 17 Gram(s) Oral daily      Vitals:  T(F): 97.4 (07-03), Max: 97.9 (07-02)  HR: 62 (07-03) (62 - 66)  BP: 155/82 (07-03) (119/71 - 155/82)  RR: 18 (07-03)  SpO2: 100% (07-03)  I&O's Summary    01 Jul 2023 07:01  -  02 Jul 2023 07:00  --------------------------------------------------------  IN: 240 mL / OUT: 0 mL / NET: 240 mL    02 Jul 2023 07:01  -  03 Jul 2023 06:54  --------------------------------------------------------  IN: 240 mL / OUT: 0 mL / NET: 240 mL        Physical Exam:  Appearance: No acute distress; well appearing  Eyes: PERRL, EOMI, pink conjunctiva  HEENT: Normal oral mucosa  Cardiovascular: RRR, S1, S2, no murmurs, rubs, or gallops; no edema; no JVD  Respiratory: Clear to auscultation bilaterally  Gastrointestinal: soft, non-tender, non-distended with normal bowel sounds  Musculoskeletal: No clubbing; no joint deformity   Neurologic: Non-focal  Lymphatic: No lymphadenopathy  Psychiatry: AAOx3, mood & affect appropriate  Skin: No rashes, ecchymoses, or cyanosis                          13.5   6.65  )-----------( 152      ( 02 Jul 2023 05:52 )             40.0     07-02    135  |  102  |  21  ----------------------------<  92  4.8   |  24  |  1.14    Ca    9.3      02 Jul 2023 05:51  Mg     2.0     07-01    TPro  7.3  /  Alb  3.9  /  TBili  0.3  /  DBili  x   /  AST  45<H>  /  ALT  61<H>  /  AlkPhos  79  07-02    PT/INR - ( 01 Jul 2023 16:12 )   PT: 14.4 sec;   INR: 1.24 ratio         PTT - ( 01 Jul 2023 16:12 )  PTT:28.4 sec  CARDIAC MARKERS ( 01 Jul 2023 17:18 )  14 ng/L / x     / x     / x     / x     / x      CARDIAC MARKERS ( 01 Jul 2023 16:12 )  15 ng/L / x     / x     / x     / x     / x        New ECG(s): Personally reviewed    A/P    82 y.o [place] M with hx of HTN, HLD, STEMI 2018 s/p PCI to 99% mLAD and 99% RPDA and 80% LCx lesion, ICM HFrEF (EF 40-45%) with an apical thrombus at that time in 2018 started on coumadin, currently living in [facility] who presented to emergency department with 1 month chest pain concerning for stable angina.     #Stable angina  Patient with substernal chest pain with exertion and emotional stress, relieved with rest in the setting of a cardiac substrate with 3vCAD s/p mLAD and 99% RPDA and 80% LCx in 2018.   Hs-Tn: 15 -> 14  ECG: Sinus rhythm with 1st degree AV block    Impression:  Unstable Angina    Plan  [facility] Mon   - Continue Plavix 75mg daily  - Aspirin 81mg daily  - Toprolol 50mg daily  - Lipitor 40mg daily  - BP control: uptitrate lisinopril as needed  - Obtain TTE with echo-contrast to also rule out LV thrombus  - Can get exercise nuclear stress test   - A1C, Lipid panel    #LV thrombus Noted on TTE from 2018 after STEMI  - Patient has been on warfarin since 2018 and did not have a follow up TTE to evaluate for resolution  - TTE with echo-contrast to rule out LV thrombus  - stop warfarin    #HFrEF (ICM) EF 40-45%  Does not appear decompensated  - Toprolol 50mg daily  - Lisinopril 10mg   - F/u repeat TTE: If LV systolic function did not recover, would aim to switch to entresto after a 72 hour washout of lisinopril       Bart Webb PGY5  Cardiology Fellow    RAJANI ID: 82 y.o Ruben KELLY with hx of HTN, HLD, STEMI 2018 s/p PCI to 99% mLAD and 99% RPDA and 80% LCx lesion, ICM HFrEF (EF 40-45%) with an apical thrombus at that time in 2018 started on coumadin, currently living in [facility] who presented to emergency department with 1 month of progressive chest pain that is worse with exertion. Pain is pressure like and occassionally radiates to his left shoulder, with associated mild SOB and palpitations and pain is relieved with rest. He denies associated nausea, vomiting, lightheadedness, syncope, diaphoresis or pain at rest. Patient was admitted to [facility] 1 week ago for same symptoms and was awaiting cardiac cath and patient left AMA at that time and came to [place] for further evaluation. He is currently chest pain free      Patient seen and examined at bedside.    Overnight Events Not seen today as pt getting [facility]    Telemetry: NSR 50-80    Current Meds:  aspirin enteric coated 81 milliGRAM(s) Oral daily  atorvastatin 40 milliGRAM(s) Oral at bedtime  bisacodyl 5 milliGRAM(s) Oral daily  chlorhexidine 2% Cloths 1 Application(s) Topical daily  clopidogrel Tablet 75 milliGRAM(s) Oral daily  enoxaparin Injectable 40 milliGRAM(s) SubCutaneous every 24 hours  lisinopril 10 milliGRAM(s) Oral daily  metoprolol succinate ER 50 milliGRAM(s) Oral daily  polyethylene glycol 3350 17 Gram(s) Oral daily      Vitals:  T(F): 97.4 (07-03), Max: 97.9 (07-02)  HR: 62 (07-03) (62 - 66)  BP: 155/82 (07-03) (119/71 - 155/82)  RR: 18 (07-03)  SpO2: 100% (07-03)  I&O's Summary    01 Jul 2023 07:01  -  02 Jul 2023 07:00  --------------------------------------------------------  IN: 240 mL / OUT: 0 mL / NET: 240 mL    02 Jul 2023 07:01 - 03 Jul 2023 06:54  --------------------------------------------------------  IN: 240 mL / OUT: 0 mL / NET: 240 mL        Physical Exam:  Appearance: No acute distress; well appearing  Eyes: PERRL, EOMI, pink conjunctiva  HEENT: Normal oral mucosa  Cardiovascular: RRR, S1, S2, no murmurs, rubs, or gallops; no edema; no JVD  Respiratory: Clear to auscultation bilaterally  Gastrointestinal: soft, non-tender, non-distended with normal bowel sounds  Musculoskeletal: No clubbing; no joint deformity   Neurologic: Non-focal  Lymphatic: No lymphadenopathy  Psychiatry: AAOx3, mood & affect appropriate  Skin: No rashes, ecchymoses, or cyanosis                          13.5   6.65  )-----------( 152      ( 02 Jul 2023 05:52 )             40.0     07-02    135  |  102  |  21  ----------------------------<  92  4.8   |  24  |  1.14    Ca    9.3      02 Jul 2023 05:51  Mg     2.0     07-01    TPro  7.3  /  Alb  3.9  /  TBili  0.3  /  DBili  x   /  AST  45<H>  /  ALT  61<H>  /  AlkPhos  79  07-02    PT/INR - ( 01 Jul 2023 16:12 )   PT: 14.4 sec;   INR: 1.24 ratio         PTT - ( 01 Jul 2023 16:12 )  PTT:28.4 sec  CARDIAC MARKERS ( 01 Jul 2023 17:18 )  14 ng/L / x     / x     / x     / x     / x      CARDIAC MARKERS ( 01 Jul 2023 16:12 )  15 ng/L / x     / x     / x     / x     / x        New ECG(s): Personally reviewed    A/P    82 y.o Ruben KELLY with hx of HTN, HLD, STEMI 2018 s/p PCI to 99% mLAD and 99% RPDA and 80% LCx lesion, ICM HFrEF (EF 40-45%) with an apical thrombus at that time in 2018 started on coumadin, currently living in [facility] who presented to emergency department with 1 month chest pain concerning for stable angina.     #Stable angina  Patient with substernal chest pain with exertion and emotional stress, relieved with rest in the setting of a cardiac substrate with 3vCAD s/p mLAD and 99% RPDA and 80% LCx in 2018.   Hs-Tn: 15 -> 14  ECG: Sinus rhythm with 1st degree AV block    Impression:  Unstable Angina    Plan  - [facility] completed today, no PCI done, recommend medical management of CAD  - Continue Plavix 75mg daily  - Aspirin 81mg daily  - Toprolol 50mg daily  - Lipitor 40mg daily  - BP control: uptitrate lisinopril as needed  - F/U TTE with echo-contrast to also rule out LV thrombus  - A1C, Lipid panel    #LV thrombus Noted on TTE from 2018 after STEMI  - Patient has been on warfarin since 2018 and did not have a follow up TTE to evaluate for resolution  - TTE with echo-contrast to rule out LV thrombus  - stop warfarin    #HFrEF (ICM) EF 40-45%  Does not appear decompensated  - Toprolol 50mg daily  - Lisinopril 10mg   - F/u repeat TTE: If LV systolic function did not recover, would aim to switch to entresto after a 72 hour washout of lisinopril       Bart Webb PGY5  Cardiology Fellow    RAJANI Valverde  ID: 82 y.o Ruben KELLY with hx of HTN, HLD, STEMI 2018 s/p PCI to 99% mLAD and 99% RPDA and 80% LCx lesion, ICM HFrEF (EF 40-45%) with an apical thrombus at that time in 2018 started on coumadin, currently living in [facility] who presented to emergency department with 1 month of progressive chest pain that is worse with exertion. Pain is pressure like and occassionally radiates to his left shoulder, with associated mild SOB and palpitations and pain is relieved with rest. He denies associated nausea, vomiting, lightheadedness, syncope, diaphoresis or pain at rest. Patient was admitted to [facility] 1 week ago for same symptoms and was awaiting cardiac cath and patient left AMA at that time and came to [place] for further evaluation. He is currently chest pain free      Patient seen and examined at bedside.    Overnight Events Not seen today as pt getting [facility]    Telemetry: NSR 50-80    Current Meds:  aspirin enteric coated 81 milliGRAM(s) Oral daily  atorvastatin 40 milliGRAM(s) Oral at bedtime  bisacodyl 5 milliGRAM(s) Oral daily  chlorhexidine 2% Cloths 1 Application(s) Topical daily  clopidogrel Tablet 75 milliGRAM(s) Oral daily  enoxaparin Injectable 40 milliGRAM(s) SubCutaneous every 24 hours  lisinopril 10 milliGRAM(s) Oral daily  metoprolol succinate ER 50 milliGRAM(s) Oral daily  polyethylene glycol 3350 17 Gram(s) Oral daily      Vitals:  T(F): 97.4 (07-03), Max: 97.9 (07-02)  HR: 62 (07-03) (62 - 66)  BP: 155/82 (07-03) (119/71 - 155/82)  RR: 18 (07-03)  SpO2: 100% (07-03)  I&O's Summary    01 Jul 2023 07:01  -  02 Jul 2023 07:00  --------------------------------------------------------  IN: 240 mL / OUT: 0 mL / NET: 240 mL    02 Jul 2023 07:01 - 03 Jul 2023 06:54  --------------------------------------------------------  IN: 240 mL / OUT: 0 mL / NET: 240 mL        Physical Exam:  Appearance: No acute distress; well appearing  Eyes: PERRL, EOMI, pink conjunctiva  HEENT: Normal oral mucosa  Cardiovascular: RRR, S1, S2, no murmurs, rubs, or gallops; no edema; no JVD  Respiratory: Clear to auscultation bilaterally  Gastrointestinal: soft, non-tender, non-distended with normal bowel sounds  Musculoskeletal: No clubbing; no joint deformity   Neurologic: Non-focal  Lymphatic: No lymphadenopathy  Psychiatry: AAOx3, mood & affect appropriate  Skin: No rashes, ecchymoses, or cyanosis                          13.5   6.65  )-----------( 152      ( 02 Jul 2023 05:52 )             40.0     07-02    135  |  102  |  21  ----------------------------<  92  4.8   |  24  |  1.14    Ca    9.3      02 Jul 2023 05:51  Mg     2.0     07-01    TPro  7.3  /  Alb  3.9  /  TBili  0.3  /  DBili  x   /  AST  45<H>  /  ALT  61<H>  /  AlkPhos  79  07-02    PT/INR - ( 01 Jul 2023 16:12 )   PT: 14.4 sec;   INR: 1.24 ratio         PTT - ( 01 Jul 2023 16:12 )  PTT:28.4 sec  CARDIAC MARKERS ( 01 Jul 2023 17:18 )  14 ng/L / x     / x     / x     / x     / x      CARDIAC MARKERS ( 01 Jul 2023 16:12 )  15 ng/L / x     / x     / x     / x     / x        New ECG(s): Personally reviewed    A/P    82 y.o Ruben KELLY with hx of HTN, HLD, STEMI 2018 s/p PCI to 99% mLAD and 99% RPDA and 80% LCx lesion, ICM HFrEF (EF 40-45%) with an apical thrombus at that time in 2018 started on coumadin, currently living in [facility] who presented to emergency department with 1 month chest pain concerning for stable angina.     #Stable angina  Patient with substernal chest pain with exertion and emotional stress, relieved with rest in the setting of a cardiac substrate with 3vCAD s/p mLAD and 99% RPDA and 80% LCx in 2018.   Hs-Tn: 15 -> 14  ECG: Sinus rhythm with 1st degree AV block    Impression:  Unstable Angina    Plan  - [facility] completed today, no PCI done, recommend medical management of CAD  - Continue Plavix 75mg daily  - Aspirin 81mg daily  - Toprolol 50mg daily  - Lipitor 40mg daily  - BP control: uptitrate lisinopril as needed  - F/U TTE with echo-contrast to also rule out LV thrombus  - A1C, Lipid panel    #LV thrombus Noted on TTE from 2018 after STEMI  - Patient has been on warfarin since 2018 and did not have a follow up TTE to evaluate for resolution  - TTE with echo-contrast to rule out LV thrombus  - stop warfarin    #HFrEF (ICM) EF 40-45%  Does not appear decompensated  - Toprolol 50mg daily  - Lisinopril 10mg   - F/u repeat TTE: If LV systolic function did not recover, would aim to switch to entresto after a 72 hour washout of lisinopril       Bart Webb PGY5  Cardiology Fellow    RAJANI Valverde     This patient was seen and examined personally by me and the plan was discussed with the fellow and/or resident above. Amendments were made as necessary to the above. Agree with the excellent note and plan above. s/p LHC w moderate disease, plan for medical mgmt and TTE pend.    Tito Valverde MD, MPhil, [facility]  Cardiologist, [facility]  ; [employer] and [facility]  Email: [email or web address] and Cardiovascular Surgery on-service contact/call information, go to [email or web address] and use [email or web address]" to login.  Outpatient Cardiology appointments, call [phone] to arrange with a colleague; I do not have outpatient Cardiology clinic.

## 2023-07-03 NOTE — PROVIDER CONTACT NOTE (OTHER) - ACTION/TREATMENT ORDERED:
Provider aware, at bedside with cardiology. No new interventions ordered. If pain increases, consider an EKG.

## 2023-07-03 NOTE — CHART NOTE - NSCHARTNOTEFT_GEN_A_CORE
Called by RN earlier - pt w/ left sided chest discomfort.   Per pt started shortly before RN called.     Cardiology at bedside at time of exam.     Vital signs stable  No events on tele    T(C): 36.7 (07-03-23 @ 12:30), Max: 36.7 (07-03-23 @ 12:30)  HR: 67 (07-03-23 @ 13:30) (53 - 73)  BP: 133/76 (07-03-23 @ 13:30) (119/71 - 165/77)  RR: 18 (07-03-23 @ 13:30) (18 - 18)  SpO2: 99% (07-03-23 @ 13:30) (94% - 100%)    Cardio exam: RRR, no murmurs or thrills, no tenderness to palpation of chest wall    Per cardio attending, some chest discomfort is normal post cath.   Will continue to monitor and notify cardio if CP worsens.

## 2023-07-03 NOTE — PROGRESS NOTE ADULT - SUBJECTIVE AND OBJECTIVE BOX
Patient is a 82y old  Male who presents with a chief complaint of chest pain (03 Jul 2023 06:54)      INTERVAL HPI/OVERNIGHT EVENTS: s/p cardiac cath , doing fair , no CP  T(C): 36.5 (07-04-23 @ 00:13), Max: 36.8 (07-03-23 @ 21:00)  HR: 65 (07-04-23 @ 00:13) (53 - 73)  BP: 137/78 (07-04-23 @ 00:13) (131/63 - 165/77)  RR: 18 (07-04-23 @ 00:13) (18 - 18)  SpO2: 98% (07-04-23 @ 00:13) (94% - 100%)  Wt(kg): --  I&O's Summary    02 Jul 2023 07:01  -  03 Jul 2023 07:00  --------------------------------------------------------  IN: 240 mL / OUT: 0 mL / NET: 240 mL    03 Jul 2023 07:01  -  04 Jul 2023 02:55  --------------------------------------------------------  IN: 600 mL / OUT: 0 mL / NET: 600 mL        PAST MEDICAL & SURGICAL HISTORY:  Hypertension      Hyperlipidemia      H/O inguinal hernia repair  Right Inguinal Hernia x2  First in 1966'  Second time with mesh in 2010          SOCIAL HISTORY  Alcohol:  Tobacco:  Illicit substance use:    FAMILY HISTORY:    REVIEW OF SYSTEMS:  CONSTITUTIONAL: No fever, weight loss, or fatigue  EYES: No eye pain, visual disturbances, or discharge  ENMT:  No difficulty hearing, tinnitus, vertigo; No sinus or throat pain  NECK: No pain or stiffness  RESPIRATORY: No cough, wheezing, chills or hemoptysis; No shortness of breath  CARDIOVASCULAR: No chest pain, palpitations, dizziness, or leg swelling  GASTROINTESTINAL: No abdominal or epigastric pain. No nausea, vomiting, or hematemesis; No diarrhea or constipation. No melena or hematochezia.  GENITOURINARY: No dysuria, frequency, hematuria, or incontinence  NEUROLOGICAL: No headaches, memory loss, loss of strength, numbness, or tremors  SKIN: No itching, burning, rashes, or lesions   LYMPH NODES: No enlarged glands  ENDOCRINE: No heat or cold intolerance; No hair loss  MUSCULOSKELETAL: No joint pain or swelling; No muscle, back, or extremity pain  PSYCHIATRIC: No depression, anxiety, mood swings, or difficulty sleeping  HEME/LYMPH: No easy bruising, or bleeding gums  ALLERY AND IMMUNOLOGIC: No hives or eczema    RADIOLOGY & ADDITIONAL TESTS:    Imaging Personally Reviewed:  [ ] YES  [ ] NO    Consultant(s) Notes Reviewed:  [ ] YES  [ ] NO    PHYSICAL EXAM:  GENERAL: NAD, well-groomed, well-developed  HEAD:  Atraumatic, Normocephalic  EYES: EOMI, PERRLA, conjunctiva and sclera clear  ENMT: No tonsillar erythema, exudates, or enlargement; Moist mucous membranes, Good dentition, No lesions  NECK: Supple, No JVD, Normal thyroid  NERVOUS SYSTEM:  Alert & Oriented X3, Good concentration; Motor Strength 5/5 B/L upper and lower extremities; DTRs 2+ intact and symmetric  CHEST/LUNG: Clear to percussion bilaterally; No rales, rhonchi, wheezing, or rubs  HEART: Regular rate and rhythm; No murmurs, rubs, or gallops  ABDOMEN: Soft, Nontender, Nondistended; Bowel sounds present  EXTREMITIES:  2+ Peripheral Pulses, No clubbing, cyanosis, or edema  LYMPH: No lymphadenopathy noted  SKIN: No rashes or lesions    LABS:                        13.5   6.65  )-----------( 152      ( 02 Jul 2023 05:52 )             40.0     07-03    136  |  102  |  24<H>  ----------------------------<  84  4.7   |  23  |  1.15    Ca    9.1      03 Jul 2023 06:35    TPro  6.9  /  Alb  3.8  /  TBili  0.5  /  DBili  x   /  AST  30  /  ALT  45  /  AlkPhos  74  07-03      Urinalysis Basic - ( 03 Jul 2023 06:35 )    Color: x / Appearance: x / SG: x / pH: x  Gluc: 84 mg/dL / Ketone: x  / Bili: x / Urobili: x   Blood: x / Protein: x / Nitrite: x   Leuk Esterase: x / RBC: x / WBC x   Sq Epi: x / Non Sq Epi: x / Bacteria: x      CAPILLARY BLOOD GLUCOSE            Urinalysis Basic - ( 03 Jul 2023 06:35 )    Color: x / Appearance: x / SG: x / pH: x  Gluc: 84 mg/dL / Ketone: x  / Bili: x / Urobili: x   Blood: x / Protein: x / Nitrite: x   Leuk Esterase: x / RBC: x / WBC x   Sq Epi: x / Non Sq Epi: x / Bacteria: x        MEDICATIONS  (STANDING):  aspirin enteric coated 81 milliGRAM(s) Oral daily  atorvastatin 40 milliGRAM(s) Oral at bedtime  bisacodyl 5 milliGRAM(s) Oral daily  chlorhexidine 2% Cloths 1 Application(s) Topical daily  clopidogrel Tablet 75 milliGRAM(s) Oral daily  enoxaparin Injectable 40 milliGRAM(s) SubCutaneous every 24 hours  lisinopril 10 milliGRAM(s) Oral daily  metoprolol succinate ER 50 milliGRAM(s) Oral daily  polyethylene glycol 3350 17 Gram(s) Oral daily  sodium chloride 0.9%. 100 milliLiter(s) (50 mL/Hr) IV Continuous <Continuous>    MEDICATIONS  (PRN):      Care Discussed with Consultants/Other Providers [ ] YES  [ ] NO

## 2023-07-03 NOTE — PROGRESS NOTE ADULT - ASSESSMENT
A/P    Chest pain / unstable angina   seen by cardio   s/p cardiac cath : no significant dis , recommend medical managment    Hx of LV thrombus   repeat echo shows no LV thrombus  d/c coumadin      Hx of CAD s/p stent :  c/w asa/plavix     HTN / HLD  -c/w toprol and lisinopril   start statin     dispo: ok to be d/c home in am , d/c coumadin on d/c meds and also decrease lipitor to 20 mg daily on d/c , rest of meds same

## 2023-07-04 ENCOUNTER — TRANSCRIPTION ENCOUNTER (OUTPATIENT)
Age: 83
End: 2023-07-04

## 2023-07-04 VITALS
RESPIRATION RATE: 18 BRPM | DIASTOLIC BLOOD PRESSURE: 79 MMHG | SYSTOLIC BLOOD PRESSURE: 149 MMHG | HEART RATE: 69 BPM | OXYGEN SATURATION: 99 % | TEMPERATURE: 98 F

## 2023-07-04 LAB
ANION GAP SERPL CALC-SCNC: 12 MMOL/L — SIGNIFICANT CHANGE UP (ref 5–17)
BUN SERPL-MCNC: 23 MG/DL — SIGNIFICANT CHANGE UP (ref 7–23)
CALCIUM SERPL-MCNC: 8.7 MG/DL — SIGNIFICANT CHANGE UP (ref 8.4–10.5)
CHLORIDE SERPL-SCNC: 103 MMOL/L — SIGNIFICANT CHANGE UP (ref 96–108)
CO2 SERPL-SCNC: 21 MMOL/L — LOW (ref 22–31)
CREAT SERPL-MCNC: 1.02 MG/DL — SIGNIFICANT CHANGE UP (ref 0.5–1.3)
EGFR: 74 ML/MIN/1.73M2 — SIGNIFICANT CHANGE UP
GLUCOSE SERPL-MCNC: 87 MG/DL — SIGNIFICANT CHANGE UP (ref 70–99)
POTASSIUM SERPL-MCNC: 4.5 MMOL/L — SIGNIFICANT CHANGE UP (ref 3.5–5.3)
POTASSIUM SERPL-SCNC: 4.5 MMOL/L — SIGNIFICANT CHANGE UP (ref 3.5–5.3)
SODIUM SERPL-SCNC: 136 MMOL/L — SIGNIFICANT CHANGE UP (ref 135–145)

## 2023-07-04 PROCEDURE — 87640 STAPH A DNA AMP PROBE: CPT

## 2023-07-04 PROCEDURE — 99233 SBSQ HOSP IP/OBS HIGH 50: CPT

## 2023-07-04 PROCEDURE — 87641 MR-STAPH DNA AMP PROBE: CPT

## 2023-07-04 PROCEDURE — 99285 EMERGENCY DEPT VISIT HI MDM: CPT

## 2023-07-04 PROCEDURE — C1769: CPT

## 2023-07-04 PROCEDURE — 85610 PROTHROMBIN TIME: CPT

## 2023-07-04 PROCEDURE — 80061 LIPID PANEL: CPT

## 2023-07-04 PROCEDURE — 84484 ASSAY OF TROPONIN QUANT: CPT

## 2023-07-04 PROCEDURE — 83036 HEMOGLOBIN GLYCOSYLATED A1C: CPT

## 2023-07-04 PROCEDURE — C1887: CPT

## 2023-07-04 PROCEDURE — 76376 3D RENDER W/INTRP POSTPROCES: CPT

## 2023-07-04 PROCEDURE — C1894: CPT

## 2023-07-04 PROCEDURE — 85027 COMPLETE CBC AUTOMATED: CPT

## 2023-07-04 PROCEDURE — 93454 CORONARY ARTERY ANGIO S&I: CPT

## 2023-07-04 PROCEDURE — 85025 COMPLETE CBC W/AUTO DIFF WBC: CPT

## 2023-07-04 PROCEDURE — 80053 COMPREHEN METABOLIC PANEL: CPT

## 2023-07-04 PROCEDURE — 80048 BASIC METABOLIC PNL TOTAL CA: CPT

## 2023-07-04 PROCEDURE — 85730 THROMBOPLASTIN TIME PARTIAL: CPT

## 2023-07-04 PROCEDURE — 71046 X-RAY EXAM CHEST 2 VIEWS: CPT

## 2023-07-04 PROCEDURE — 83735 ASSAY OF MAGNESIUM: CPT

## 2023-07-04 PROCEDURE — 36415 COLL VENOUS BLD VENIPUNCTURE: CPT

## 2023-07-04 PROCEDURE — 83880 ASSAY OF NATRIURETIC PEPTIDE: CPT

## 2023-07-04 PROCEDURE — 93306 TTE W/DOPPLER COMPLETE: CPT

## 2023-07-04 PROCEDURE — 84443 ASSAY THYROID STIM HORMONE: CPT

## 2023-07-04 RX ORDER — LISINOPRIL 2.5 MG/1
1 TABLET ORAL
Qty: 0 | Refills: 0 | DISCHARGE
Start: 2023-07-04

## 2023-07-04 RX ORDER — LISINOPRIL 2.5 MG/1
10 TABLET ORAL ONCE
Refills: 0 | Status: COMPLETED | OUTPATIENT
Start: 2023-07-04 | End: 2023-07-04

## 2023-07-04 RX ORDER — NITROGLYCERIN 6.5 MG
0.4 CAPSULE, EXTENDED RELEASE ORAL
Qty: 0 | Refills: 0 | DISCHARGE

## 2023-07-04 RX ORDER — LISINOPRIL 2.5 MG/1
20 TABLET ORAL DAILY
Refills: 0 | Status: DISCONTINUED | OUTPATIENT
Start: 2023-07-05 | End: 2023-07-04

## 2023-07-04 RX ADMIN — Medication 81 MILLIGRAM(S): at 11:18

## 2023-07-04 RX ADMIN — Medication 50 MILLIGRAM(S): at 05:03

## 2023-07-04 RX ADMIN — CLOPIDOGREL BISULFATE 75 MILLIGRAM(S): 75 TABLET, FILM COATED ORAL at 11:18

## 2023-07-04 RX ADMIN — ENOXAPARIN SODIUM 40 MILLIGRAM(S): 100 INJECTION SUBCUTANEOUS at 05:02

## 2023-07-04 RX ADMIN — LISINOPRIL 10 MILLIGRAM(S): 2.5 TABLET ORAL at 05:03

## 2023-07-04 RX ADMIN — CHLORHEXIDINE GLUCONATE 1 APPLICATION(S): 213 SOLUTION TOPICAL at 11:19

## 2023-07-04 RX ADMIN — LISINOPRIL 10 MILLIGRAM(S): 2.5 TABLET ORAL at 11:18

## 2023-07-04 NOTE — PROGRESS NOTE ADULT - SUBJECTIVE AND OBJECTIVE BOX
Patient seen and examined at bedside.    Overnight Events:        REVIEW OF SYSTEMS:  Constitutional:     [ x] negative [ ] fevers [ ] chills [ ] weight loss [ ] weight gain  HEENT:                  [ x] negative [ ] dry eyes [ ] eye irritation [ ] postnasal drip [ ] nasal congestion  CV:                         [x ] negative  [ ] chest pain [ ] orthopnea [ ] palpitations [ ] murmur  Resp:                     [ x] negative [ ] cough [ ] shortness of breath [ ] dyspnea [ ] wheezing [ ] sputum [ ]hemoptysis  GI:                          [ x] negative [ ] nausea [ ] vomiting [ ] diarrhea [ ] constipation [ ] abd pain [ ] dysphagia   :                        [ x] negative [ ] dysuria [ ] nocturia [ ] hematuria [ ] increased urinary frequency  Musculoskeletal: [ x] negative [ ] back pain [ ] myalgias [ ] arthralgias [ ] fracture  Skin:                       [ x] negative [ ] rash [ ] itch  Neurological:        [ x] negative [ ] headache [ ] dizziness [ ] syncope [ ] weakness [ ] numbness  Psychiatric:           [ x] negative [ ] anxiety [ ] depression  Endocrine:            [ x] negative [ ] diabetes [ ] thyroid problem  Heme/Lymph:      [ x] negative [ ] anemia [ ] bleeding problem  Allergic/Immune: [x ] negative [ ] itchy eyes [ ] nasal discharge [ ] hives [ ] angioedema    [x ] All other systems negative  [ ] Unable to assess ROS due to    Current Meds:  aspirin enteric coated 81 milliGRAM(s) Oral daily  atorvastatin 40 milliGRAM(s) Oral at bedtime  bisacodyl 5 milliGRAM(s) Oral daily  chlorhexidine 2% Cloths 1 Application(s) Topical daily  clopidogrel Tablet 75 milliGRAM(s) Oral daily  enoxaparin Injectable 40 milliGRAM(s) SubCutaneous every 24 hours  lisinopril 10 milliGRAM(s) Oral daily  metoprolol succinate ER 50 milliGRAM(s) Oral daily  polyethylene glycol 3350 17 Gram(s) Oral daily  sodium chloride 0.9%. 100 milliLiter(s) IV Continuous <Continuous>      PAST MEDICAL & SURGICAL HISTORY:  Hypertension      Hyperlipidemia      H/O inguinal hernia repair  Right Inguinal Hernia x2  First in 1966'  Second time with mesh in 2010          Vitals:  T(F): 97.6 (07-04), Max: 98.2 (07-03)  HR: 79 (07-04) (53 - 79)  BP: 150/85 (07-04) (131/63 - 165/77)  RR: 18 (07-04)  SpO2: 99% (07-04)  I&O's Summary    02 Jul 2023 07:01  -  03 Jul 2023 07:00  --------------------------------------------------------  IN: 240 mL / OUT: 0 mL / NET: 240 mL    03 Jul 2023 07:01  -  04 Jul 2023 05:52  --------------------------------------------------------  IN: 600 mL / OUT: 0 mL / NET: 600 mL        Physical Exam:  Appearance: No acute distress; well appearing  Eyes: PERRL, EOMI, pink conjunctiva  HENT: Normal oral mucosa  Cardiovascular: RRR, S1, S2, no murmurs, rubs, or gallops; no edema; no JVD  Respiratory: Clear to auscultation bilaterally  Gastrointestinal: soft, non-tender, non-distended with normal bowel sounds  Musculoskeletal: No clubbing; no joint deformity   Neurologic: Non-focal  Lymphatic: No lymphadenopathy  Psychiatry: AAOx3, mood & affect appropriate  Skin: No rashes, ecchymoses, or cyanosis      07-03    136  |  102  |  24<H>  ----------------------------<  84  4.7   |  23  |  1.15    Ca    9.1      03 Jul 2023 06:35    TPro  6.9  /  Alb  3.8  /  TBili  0.5  /  DBili  x   /  AST  30  /  ALT  45  /  AlkPhos  74  07-03                     Patient seen and examined at bedside.    Overnight Events:  NAEON. No acute symptoms currently.       REVIEW OF SYSTEMS:  Constitutional:     [ x] negative [ ] fevers [ ] chills [ ] weight loss [ ] weight gain  HEENT:                  [ x] negative [ ] dry eyes [ ] eye irritation [ ] postnasal drip [ ] nasal congestion  CV:                         [x ] negative  [ ] chest pain [ ] orthopnea [ ] palpitations [ ] murmur  Resp:                     [ x] negative [ ] cough [ ] shortness of breath [ ] dyspnea [ ] wheezing [ ] sputum [ ]hemoptysis  GI:                          [ x] negative [ ] nausea [ ] vomiting [ ] diarrhea [ ] constipation [ ] abd pain [ ] dysphagia   :                        [ x] negative [ ] dysuria [ ] nocturia [ ] hematuria [ ] increased urinary frequency  Musculoskeletal: [ x] negative [ ] back pain [ ] myalgias [ ] arthralgias [ ] fracture  Skin:                       [ x] negative [ ] rash [ ] itch  Neurological:        [ x] negative [ ] headache [ ] dizziness [ ] syncope [ ] weakness [ ] numbness  Psychiatric:           [ x] negative [ ] anxiety [ ] depression  Endocrine:            [ x] negative [ ] diabetes [ ] thyroid problem  Heme/Lymph:      [ x] negative [ ] anemia [ ] bleeding problem  Allergic/Immune: [x ] negative [ ] itchy eyes [ ] nasal discharge [ ] hives [ ] angioedema    [x ] All other systems negative  [ ] Unable to assess ROS due to    Current Meds:  aspirin enteric coated 81 milliGRAM(s) Oral daily  atorvastatin 40 milliGRAM(s) Oral at bedtime  bisacodyl 5 milliGRAM(s) Oral daily  chlorhexidine 2% Cloths 1 Application(s) Topical daily  clopidogrel Tablet 75 milliGRAM(s) Oral daily  enoxaparin Injectable 40 milliGRAM(s) SubCutaneous every 24 hours  lisinopril 10 milliGRAM(s) Oral daily  metoprolol succinate ER 50 milliGRAM(s) Oral daily  polyethylene glycol 3350 17 Gram(s) Oral daily  sodium chloride 0.9%. 100 milliLiter(s) IV Continuous <Continuous>      PAST MEDICAL & SURGICAL HISTORY:  Hypertension      Hyperlipidemia      H/O inguinal hernia repair  Right Inguinal Hernia x2  First in 1966'  Second time with mesh in 2010          Vitals:  T(F): 97.6 (07-04), Max: 98.2 (07-03)  HR: 79 (07-04) (53 - 79)  BP: 150/85 (07-04) (131/63 - 165/77)  RR: 18 (07-04)  SpO2: 99% (07-04)  I&O's Summary    02 Jul 2023 07:01  -  03 Jul 2023 07:00  --------------------------------------------------------  IN: 240 mL / OUT: 0 mL / NET: 240 mL    03 Jul 2023 07:01  -  04 Jul 2023 05:52  --------------------------------------------------------  IN: 600 mL / OUT: 0 mL / NET: 600 mL        Physical Exam:  Appearance: No acute distress; well appearing  Eyes: PERRL, EOMI, pink conjunctiva  HENT: Normal oral mucosa  Cardiovascular: RRR, S1, S2, no murmurs, rubs, or gallops; no edema; no JVD  Respiratory: Clear to auscultation bilaterally  Gastrointestinal: soft, non-tender, non-distended with normal bowel sounds  Musculoskeletal: No clubbing; no joint deformity   Neurologic: Non-focal  Lymphatic: No lymphadenopathy  Psychiatry: AAOx3, mood & affect appropriate  Skin: No rashes, ecchymoses, or cyanosis      07-03    136  |  102  |  24<H>  ----------------------------<  84  4.7   |  23  |  1.15    Ca    9.1      03 Jul 2023 06:35    TPro  6.9  /  Alb  3.8  /  TBili  0.5  /  DBili  x   /  AST  30  /  ALT  45  /  AlkPhos  74  07-03

## 2023-07-04 NOTE — PROGRESS NOTE ADULT - ASSESSMENT
82 y.o Mexican M with hx of HTN, HLD, STEMI 2018 s/p PCI to 99% mLAD and 99% RPDA and 80% LCx lesion, ICM HFrEF (EF 40-45%) with an apical thrombus at that time in 2018 started on coumadin, currently living in [facility] who presented to emergency department with 1 month chest pain concerning for stable angina.     #Stable angina  Patient with substernal chest pain with exertion and emotional stress, relieved with rest in the setting of a cardiac substrate with 3vCAD s/p mLAD and 99% RPDA and 80% LCx in 2018.   Hs-Tn: 15 -> 14  ECG: Sinus rhythm with 1st degree AV block    Impression:  Unstable Angina    Plan  - [facility] completed today, no PCI done, recommend medical management of CAD  - Continue Plavix 75mg daily  - Aspirin 81mg daily  - Toprolol 50mg daily  - Lipitor 40mg daily  - BP control: uptitrate lisinopril as needed  - F/U TTE with echo-contrast to also rule out LV thrombus  - A1C, Lipid panel    #LV thrombus Noted on TTE from 2018 after STEMI  - Patient has been on warfarin since 2018 and did not have a follow up TTE to evaluate for resolution  - TTE with echo-contrast to rule out LV thrombus  - stop warfarin    #HFrEF (ICM) EF 40-45%  Does not appear decompensated  - Toprolol 50mg daily  - Lisinopril 10mg   - F/u repeat TTE: If LV systolic function did not recover, would aim to switch to entresto after a 72 hour washout of lisinopril         82 y.o Indian M with hx of HTN, HLD, STEMI 2018 s/p PCI to 99% mLAD and 99% RPDA and 80% LCx lesion, ICM HFrEF (EF 40-45%) with an apical thrombus at that time in 2018 started on coumadin, currently living in [facility] who presented to emergency department with 1 month chest pain concerning for stable angina.     #Stable angina  Patient with substernal chest pain with exertion and emotional stress, relieved with rest in the setting of a cardiac substrate with 3vCAD s/p mLAD and 99% RPDA and 80% LCx in 2018.   Hs-Tn: 15 -> 14  ECG: Sinus rhythm with 1st degree AV block    Impression:  Unstable Angina    Plan  - [facility] completed today, no PCI done, recommend medical management of CAD  - Continue Plavix 75mg daily  - Aspirin 81mg daily  - Toprolol 50mg daily  - Lipitor 40mg daily  - BP control: uptitrate lisinopril as needed  - F/U TTE with echo-contrast to also rule out LV thrombus  - A1C, Lipid panel    #LV thrombus Noted on TTE from 2018 after STEMI  - Patient has been on warfarin since 2018 and did not have a follow up TTE to evaluate for resolution  - TTE with echo-contrast to rule out LV thrombus  - stop warfarin    #HFrEF (ICM) EF 40-45%  Does not appear decompensated  - Toprol 50mg daily  - Increasae Lisinopril to 20 mg qd  - Please arrange close followup with

## 2023-07-04 NOTE — DISCHARGE NOTE PROVIDER - NSDCCPCAREPLAN_GEN_ALL_CORE_FT
PRINCIPAL DISCHARGE DIAGNOSIS  Diagnosis: Stable angina  Assessment and Plan of Treatment: Patient with substernal chest pain with exertion and emotional stress, relieved with rest in the setting of a cardiac substrate with 3vCAD s/p mLAD and 99% RPDA and 80% LCx in 2018.   - blood work troponin was normal - 15 -> 14  - ECG: Sinus rhythm with 1st degree AV block  -  You had left heart cardiac catheterization -  stent patent, D1 , Cx patent stent, mRCA 40% RPDA , no PCI done, recommend medical management of CAD  - Continue Plavix 75mg daily  - Continue Aspirin 81mg daily  - Continue [id number] daily  - Continue Lipitor 40mg daily  - BP control: uptitrated lisinopril to 20 mg daily.  Weight management.   Take medications as prescribed.    No smoking.  Follow up with cardiologist in 1 week  Do not stop Aspirin or Plavix  unless instructed by your cardiologist.  - Follow up with cardiologist in 1 week  HOME CARE INSTRUCTIONS  For the next few days, avoid physical activities that bring on chest pain. Continue physical activities as directed.  Do not smoke.  Avoid drinking alcohol.   SEEK IMMEDIATE MEDICAL CARE IF:  You have increased chest pain or pain that spreads to your arm, neck, jaw, back, or abdomen.   You develop shortness of breath, an increasing cough, or you are coughing up blood.  You have severe back or abdominal pain, feel nauseous, or vomit.  You develop severe weakness, fainting, or chills.  You have a fever.  THIS IS AN EMERGENCY. Do not wait to see if the pain will go away. Get medical help at once. Call your local emergency services (________[phone]). Do not drive yourself to the hospital.        SECONDARY DISCHARGE DIAGNOSES  Diagnosis: CAD, multiple vessel  Assessment and Plan of Treatment: Coronary artery disease is a condition where the arteries the supply the heart muscle get clogges with fatty deposits & puts you at risk for a heart attack  Call your doctor if you have any new pain, pressure, or discomfort in the center of your chest, pain, tingling or discomfort in arms, back, neck, jaw, or stomach, shortness of breath, nausea, vomiting, burping or heartburn, sweating, cold and clammy skin, racing or abnormal heartbeat for more than 10 minutes or if they keep coming & going.  Call 911 and do not tr to get to hospital by care  You can help yourself with lefestyle changes (quitting smoking if you smoke), eat lots of fruits & vegetables & low fat dairy products, not a lot of meat & fatty foods, walk or some form of physical activity most days of the week, lose weight if you are overweight  Take your cardiac medication as prescribed to lower cholesterol, to lower blood pressure, aspirin to prevent blood clots, and diabetes control  Make sure to keep appointments with doctor for cardiac follow up care      Diagnosis: Hypertension  Assessment and Plan of Treatment: Lisinopril dose was increased to 20mg daily.  - Follow up with cardiologist in 1 week  Eat a heart healthy diet that is low in saturated fats and salt, and includes whole grains, fruits, vegetables and lean protein   Exercise regularly (consult with your physician or cardiologist first); maintain a heart healthy weight.   If you smoke - quit - Continue to follow with your primary physician or cardiologist.   Seek medical help for dizziness, Lightheadedness, Blurry vision, Headache, Chest pain, Shortness of breath  Follow up with your medical doctor to establish long term blood pressure treatment goals.      Diagnosis: LV (left ventricular) mural thrombus following MI  Assessment and Plan of Treatment: You had a history of blood clot in heart after a heart attack in 2018  - Patient has been on warfarin since 2018   - Echo with echo-contrast shows no LV thrombus (No clots)  - stop warfarin      Diagnosis: Grade I diastolic dysfunction  Assessment and Plan of Treatment: Mild diastolic Dysfunction  Your heart pumping was 40-45% - in 2018  - Echo on 7/3/23 shows EF of 70 to 75 %.  There is mild (grade 1) left ventricular diastolic dysfunction, with normal filling pressure.  - Does not appear decompensated  - Continue Toprol 50mg daily  - Lisinopril dose increased to 20mg for better B/P control.  - Follow up with cardiologist

## 2023-07-04 NOTE — DISCHARGE NOTE PROVIDER - NSDCMRMEDTOKEN_GEN_ALL_CORE_FT
aspirin 81 mg oral tablet, chewable: 1 tab(s) orally once a day  atorvastatin 40 mg oral tablet: 1 tab(s) orally once a day (at bedtime)  clopidogrel 75 mg oral tablet: 1 tab(s) orally once a day  lisinopril 20 mg oral tablet: 1 tab(s) orally once a day (Pt has 20mg at home - he cuts the 20mg at home for his 10mg  dose)  metoprolol succinate 50 mg oral tablet, extended release: 1 tab(s) orally once a day

## 2023-07-04 NOTE — DISCHARGE NOTE PROVIDER - CARE PROVIDER_API CALL
Cardiologist at [facility],   Phone: (   )    -  Fax: (   )    -  Follow Up Time: 1 week

## 2023-07-04 NOTE — DISCHARGE NOTE NURSING/CASE MANAGEMENT/SOCIAL WORK - NSDCFUADDAPPT_GEN_ALL_CORE_FT
Patient contacted regarding COVID-19 diagnosis. Discussed COVID-19 related testing which was available at this time. Test results were positive. Patient informed of results, if available? Yes    Ambulatory Care Manager contacted the family by telephone to perform follow-up assessment. Verified name and  with family as identifiers. Patient has following risk factors of: heart failure. Symptoms reviewed with family who verbalized the following symptoms: fatigue, pain or aching joints and shortness of breath. Due to no new or worsening symptoms encounter was not routed to provider for escalation. Educated patient about risk for severe COVID-19 due to risk factors according to CDC guidelines. ACM reviewed discharge instructions, medical action plan and red flag symptoms with the family who verbalized understanding. Discussed COVID vaccination status: Yes. Education provided on COVID-19 vaccination as appropriate. Discussed exposure protocols and quarantine with CDC Guidelines. Family was given an opportunity to verbalize any questions and concerns and agrees to contact ACM or health care provider for questions related to their healthcare. Was patient discharged with a pulse oximeter? No Discussed and confirmed pulse oximeter discharge instructions and when to notify provider or seek emergency care. ACM provided contact information. No further follow-up call identified based on severity of symptoms and risk factors. Spoke with patient , he states that his wife's symptoms have improved a little since last call. PCP appointment set up for 22.   agrees to call if he has questions or concerns
APPTS ARE READY TO BE MADE: [x] YES    Best Family or Patient Contact (if needed):    Additional Information about above appointments (if needed):    1:   2:   3:     Other comments or requests:

## 2023-07-04 NOTE — DISCHARGE NOTE NURSING/CASE MANAGEMENT/SOCIAL WORK - NSDCPEFALRISK_GEN_ALL_CORE
For information on Fall & Injury Prevention, visit: [email or web address] OR  [email or web address] OR  https://www.cdc.gov/steadi/patient.html

## 2023-07-04 NOTE — DISCHARGE NOTE PROVIDER - NSDCCPTREATMENT_GEN_ALL_CORE_FT
PRINCIPAL PROCEDURE  Procedure: Left heart cardiac cath  Findings and Treatment: [facility] on 7/3/23 shows  LAD stent patent, D1 , Cx patent stent, mRCA 40% RPDA ; medical management recs.   Coronary Angiography   The coronary circulation is right dominant. Cardiac catheterization  was performed electively.  LM   Left main artery: Angiography shows no disease.    LAD   Mid left anterior descending: Angiography shows mild atherosclerosis.  Mild ISR.. There is a 30 % stenosis.  CX   Circumflex: Angiography shows minor irregularities.    RCA   Mid right coronary artery: Angiography shows mild atherosclerosis.  Mild ISR.. There is a 30 % stenosis. First right  posterolateral: The segment is small. Angiography shows moderate  atherosclerosis. There is a 40 % stenosis.  No heavy lifting or pushing/pulling with procedure arm for 2 weeks. No driving for 2 days. You may shower 24 hours following the procedure but avoid baths/swimming for 1 week. Check your wrist site for bleeding and/or swelling daily following procedure and call your doctor immediately if it occurs or if you experience increased pain at the site, cold or discoloration of arm. Follow up with your cardiologist in 1-2 weeks. You may call [facility] if you have any questions/concerns regarding your procedure [phone].        SECONDARY PROCEDURE  Procedure: Transthoracic echo  Findings and Treatment: 1. Normal left ventricular cavity size. The left ventricular wall thickness is normal. The left ventricular systolic function is hyperdynamic with an ejection fraction visually estimated at 70 to 75 %. There are regional wall motion abnormalities No evidence of a thrombus in the left ventricle.   2. Apical septal segment and apex are abnormal.   3. There is mild (grade 1) left ventricular diastolic dysfunction, with normal filling pressure.   4. Normal atria.   5. Normal right ventricular cavity size, normal right ventricular wall thickness and normal right ventricular systolic function. The tricuspid annular plane systolic excursion (TAPSE) is 2.4 cm (normal >=1.7 cm).   6. No significant valvular disease.   7. No pericardial effusion seen.   8. No prior echocardiogram is available for comparison.   9. There is normal LV mass and concentric remodeling.  ________________________________________________________________________________________  FINDINGS:     Left Ventricle:  Normal left ventricular cavity size. The left ventricular wall thickness is normal. The left ventricular systolic function is hyperdynamic with an ejection fraction visually estimated at 70 to 75%. There are regional wall motion abnormalities consistent with ischemic heart disease. There is mild (grade 1) left ventricular diastolic dysfunction, with normal filling pressure. There is normal LV mass and concentric remodeling. There is no evidence of a thrombus in the left ventricle. Basal septal hypertrophy.  LV Wall Scoring: The apical septal segment and apex are hypokinetic. All  remaining scored segments are normal.        Right Ventricle:  Normal right ventricular cavity size, normal wall thickness and normal right ventricular systolic function. Tricuspid annular plane systolic excursion (TAPSE) is 2.4 cm (normal >=1.7 cm).     Left Atrium:  The left atrium is normal with an indexed volume of 26.81 ml/m².     Right Atrium:  The right atrium is normal with an indexed volume of 11.66 ml/m².     Aortic Valve:  The aortic

## 2023-07-04 NOTE — DISCHARGE NOTE NURSING/CASE MANAGEMENT/SOCIAL WORK - PATIENT PORTAL LINK FT
You can access the FollowMyHealth Patient Portal offered by [facility] by registering at the following website: [email or web address]. By joining [facility]’s FollowMyHealth portal, you will also be able to view your health information using other applications (apps) compatible with our system.

## 2023-07-04 NOTE — PROGRESS NOTE ADULT - ATTENDING COMMENTS
Known Disease. No intervention.  Proceed with medical management  F/u                                                                         Forty-1 Minutes Spent in Patient Management

## 2024-07-26 ENCOUNTER — INPATIENT (INPATIENT)
Facility: HOSPITAL | Age: 84
LOS: 2 days | Discharge: HOME | DRG: 311 | End: 2024-07-29
Attending: INTERNAL MEDICINE | Admitting: INTERNAL MEDICINE
Payer: MEDICAID

## 2024-07-26 ENCOUNTER — RESULT REVIEW (OUTPATIENT)
Age: 84
End: 2024-07-26

## 2024-07-26 VITALS
HEIGHT: 66 IN | RESPIRATION RATE: 18 BRPM | TEMPERATURE: 99 F | SYSTOLIC BLOOD PRESSURE: 150 MMHG | WEIGHT: 179.9 LBS | DIASTOLIC BLOOD PRESSURE: 84 MMHG | HEART RATE: 61 BPM | OXYGEN SATURATION: 99 %

## 2024-07-26 DIAGNOSIS — Z98.890 OTHER SPECIFIED POSTPROCEDURAL STATES: Chronic | ICD-10-CM

## 2024-07-26 DIAGNOSIS — I20.0 UNSTABLE ANGINA: ICD-10-CM

## 2024-07-26 LAB
ALBUMIN SERPL ELPH-MCNC: 4.1 G/DL — SIGNIFICANT CHANGE UP (ref 3.3–5)
ALP SERPL-CCNC: 79 U/L — SIGNIFICANT CHANGE UP (ref 40–120)
ALT FLD-CCNC: 23 U/L — SIGNIFICANT CHANGE UP (ref 10–45)
ANION GAP SERPL CALC-SCNC: 10 MMOL/L — SIGNIFICANT CHANGE UP (ref 5–17)
APTT BLD: 30.1 SEC — SIGNIFICANT CHANGE UP (ref 24.5–35.6)
AST SERPL-CCNC: 19 U/L — SIGNIFICANT CHANGE UP (ref 10–40)
BASOPHILS # BLD AUTO: 0.02 K/UL — SIGNIFICANT CHANGE UP (ref 0–0.2)
BASOPHILS NFR BLD AUTO: 0.3 % — SIGNIFICANT CHANGE UP (ref 0–2)
BILIRUB SERPL-MCNC: 0.4 MG/DL — SIGNIFICANT CHANGE UP (ref 0.2–1.2)
BUN SERPL-MCNC: 15 MG/DL — SIGNIFICANT CHANGE UP (ref 7–23)
CALCIUM SERPL-MCNC: 9.2 MG/DL — SIGNIFICANT CHANGE UP (ref 8.4–10.5)
CHLORIDE SERPL-SCNC: 102 MMOL/L — SIGNIFICANT CHANGE UP (ref 96–108)
CO2 SERPL-SCNC: 24 MMOL/L — SIGNIFICANT CHANGE UP (ref 22–31)
CREAT SERPL-MCNC: 1.04 MG/DL — SIGNIFICANT CHANGE UP (ref 0.5–1.3)
EGFR: 72 ML/MIN/1.73M2 — SIGNIFICANT CHANGE UP
EOSINOPHIL # BLD AUTO: 0.07 K/UL — SIGNIFICANT CHANGE UP (ref 0–0.5)
EOSINOPHIL NFR BLD AUTO: 1.1 % — SIGNIFICANT CHANGE UP (ref 0–6)
GLUCOSE SERPL-MCNC: 96 MG/DL — SIGNIFICANT CHANGE UP (ref 70–99)
HCT VFR BLD CALC: 36.9 % — LOW (ref 39–50)
HGB BLD-MCNC: 12.3 G/DL — LOW (ref 13–17)
IMM GRANULOCYTES NFR BLD AUTO: 0.5 % — SIGNIFICANT CHANGE UP (ref 0–0.9)
INR BLD: 1.17 RATIO — SIGNIFICANT CHANGE UP (ref 0.85–1.18)
LYMPHOCYTES # BLD AUTO: 1.99 K/UL — SIGNIFICANT CHANGE UP (ref 1–3.3)
LYMPHOCYTES # BLD AUTO: 31 % — SIGNIFICANT CHANGE UP (ref 13–44)
MAGNESIUM SERPL-MCNC: 2 MG/DL — SIGNIFICANT CHANGE UP (ref 1.6–2.6)
MCHC RBC-ENTMCNC: 32.2 PG — SIGNIFICANT CHANGE UP (ref 27–34)
MCHC RBC-ENTMCNC: 33.3 GM/DL — SIGNIFICANT CHANGE UP (ref 32–36)
MCV RBC AUTO: 96.6 FL — SIGNIFICANT CHANGE UP (ref 80–100)
MONOCYTES # BLD AUTO: 0.57 K/UL — SIGNIFICANT CHANGE UP (ref 0–0.9)
MONOCYTES NFR BLD AUTO: 8.9 % — SIGNIFICANT CHANGE UP (ref 2–14)
NEUTROPHILS # BLD AUTO: 3.73 K/UL — SIGNIFICANT CHANGE UP (ref 1.8–7.4)
NEUTROPHILS NFR BLD AUTO: 58.2 % — SIGNIFICANT CHANGE UP (ref 43–77)
NRBC # BLD: 0 /100 WBCS — SIGNIFICANT CHANGE UP (ref 0–0)
NT-PROBNP SERPL-SCNC: 107 PG/ML — SIGNIFICANT CHANGE UP (ref 0–300)
PLATELET # BLD AUTO: 162 K/UL — SIGNIFICANT CHANGE UP (ref 150–400)
POTASSIUM SERPL-MCNC: 4.1 MMOL/L — SIGNIFICANT CHANGE UP (ref 3.5–5.3)
POTASSIUM SERPL-SCNC: 4.1 MMOL/L — SIGNIFICANT CHANGE UP (ref 3.5–5.3)
PROT SERPL-MCNC: 7.3 G/DL — SIGNIFICANT CHANGE UP (ref 6–8.3)
PROTHROM AB SERPL-ACNC: 12.2 SEC — SIGNIFICANT CHANGE UP (ref 9.5–13)
RBC # BLD: 3.82 M/UL — LOW (ref 4.2–5.8)
RBC # FLD: 12.3 % — SIGNIFICANT CHANGE UP (ref 10.3–14.5)
SODIUM SERPL-SCNC: 136 MMOL/L — SIGNIFICANT CHANGE UP (ref 135–145)
TROPONIN T, HIGH SENSITIVITY RESULT: 14 NG/L — SIGNIFICANT CHANGE UP (ref 0–51)
TROPONIN T, HIGH SENSITIVITY RESULT: 17 NG/L — SIGNIFICANT CHANGE UP (ref 0–51)
WBC # BLD: 6.41 K/UL — SIGNIFICANT CHANGE UP (ref 3.8–10.5)
WBC # FLD AUTO: 6.41 K/UL — SIGNIFICANT CHANGE UP (ref 3.8–10.5)

## 2024-07-26 PROCEDURE — 99223 1ST HOSP IP/OBS HIGH 75: CPT | Mod: GC

## 2024-07-26 PROCEDURE — 71046 X-RAY EXAM CHEST 2 VIEWS: CPT | Mod: 26

## 2024-07-26 PROCEDURE — 99285 EMERGENCY DEPT VISIT HI MDM: CPT

## 2024-07-26 PROCEDURE — 93306 TTE W/DOPPLER COMPLETE: CPT | Mod: 26

## 2024-07-26 RX ORDER — ASPIRIN 500 MG
324 TABLET ORAL ONCE
Refills: 0 | Status: COMPLETED | OUTPATIENT
Start: 2024-07-26 | End: 2024-07-26

## 2024-07-26 RX ORDER — ASPIRIN 500 MG
81 TABLET ORAL DAILY
Refills: 0 | Status: DISCONTINUED | OUTPATIENT
Start: 2024-07-26 | End: 2024-07-29

## 2024-07-26 RX ORDER — LISINOPRIL 10 MG/1
20 TABLET ORAL DAILY
Refills: 0 | Status: DISCONTINUED | OUTPATIENT
Start: 2024-07-26 | End: 2024-07-29

## 2024-07-26 RX ORDER — ATORVASTATIN CALCIUM 40 MG/1
40 TABLET, FILM COATED ORAL AT BEDTIME
Refills: 0 | Status: DISCONTINUED | OUTPATIENT
Start: 2024-07-26 | End: 2024-07-29

## 2024-07-26 RX ORDER — CLOPIDOGREL BISULFATE 75 MG/1
75 TABLET, FILM COATED ORAL DAILY
Refills: 0 | Status: DISCONTINUED | OUTPATIENT
Start: 2024-07-26 | End: 2024-07-29

## 2024-07-26 RX ORDER — METOPROLOL TARTRATE 100 MG
50 TABLET ORAL DAILY
Refills: 0 | Status: DISCONTINUED | OUTPATIENT
Start: 2024-07-26 | End: 2024-07-29

## 2024-07-26 RX ORDER — ENOXAPARIN SODIUM 120 MG/.8ML
40 INJECTION SUBCUTANEOUS EVERY 24 HOURS
Refills: 0 | Status: DISCONTINUED | OUTPATIENT
Start: 2024-07-26 | End: 2024-07-29

## 2024-07-26 RX ADMIN — ATORVASTATIN CALCIUM 40 MILLIGRAM(S): 40 TABLET, FILM COATED ORAL at 22:31

## 2024-07-26 RX ADMIN — ENOXAPARIN SODIUM 40 MILLIGRAM(S): 120 INJECTION SUBCUTANEOUS at 17:37

## 2024-07-26 RX ADMIN — Medication 324 MILLIGRAM(S): at 10:21

## 2024-07-26 RX ADMIN — LISINOPRIL 20 MILLIGRAM(S): 10 TABLET ORAL at 20:44

## 2024-07-26 RX ADMIN — Medication 50 MILLIGRAM(S): at 20:44

## 2024-07-26 NOTE — ED PROVIDER NOTE - ATTENDING APP SHARED VISIT CONTRIBUTION OF CARE
------------ATTENDING NOTE------------  pt w/ daughter c/o 2 wks of increasing episodes of chest pain, describes onset of mild/moderate central chest ache w/ feeling sob with exertion, improves w/ rest, complicated by underlying metabolic syndrome/CAD, has been compliant w/ medications, no current chest pain/discomfort or sob/dyspnea, benign exam now, awaiting labs/imaging and close reassessments -->  - Raul Ferreira MD   -------------------------------------------------------

## 2024-07-26 NOTE — H&P ADULT - ASSESSMENT
A/P     Chest pain r/o ACS   -c/w asa/plavix   cardio consulted : house     CAD s/p stents   -had LHC in 2023 at this hospital : advised for medical managment   no new stents were placed   check echo     HTN :   c/w lisinopril and Toprol XL     HLD  -c/w lipitor     advance care planning : d/w patient regarding advance directive. d/w him regarding intubation / CPR if need arise. Agrees for everything for now. remain full code. time spend over 15 min

## 2024-07-26 NOTE — ED ADULT NURSE NOTE - NSFALLRISKINTERV_ED_ALL_ED

## 2024-07-26 NOTE — PATIENT PROFILE ADULT - FUNCTIONAL ASSESSMENT - BASIC MOBILITY 6.
4-calculated by average /Not able to assess (calculate score using [facility] averaging method)

## 2024-07-26 NOTE — CONSULT NOTE ADULT - ATTENDING COMMENTS
83 year old man with acute coronary syndrome, stents 2018, presented with chest pain 1 year ago, no occlusive disease on coronary angiogram. Presents again describing exertional chest pain, enzymes negative, EKG non-ischemic. Plan treadmill exercise stress test with radionuclide perfusion imaging on Sunday.    To contact call Cardiology Fellow or Attending as listed on [email or web address] password: [email or web address].

## 2024-07-26 NOTE — ED ADULT NURSE REASSESSMENT NOTE - STATUS
awaiting bed, no change
awaiting bed, no change
pt and family made aware of admission and waiting for inpt bed assignment/awaiting bed, no change

## 2024-07-26 NOTE — ED ADULT NURSE REASSESSMENT NOTE - COMFORT CARE
pt aware being transported to echo at present/plan of care explained/wait time explained
plan of care explained/wait time explained
plan of care explained/wait time explained

## 2024-07-26 NOTE — ED ADULT NURSE NOTE - IS THE PATIENT ABLE TO BE SCREENED?
Dr Posey's cell phone #: [phone]    Dr Posey's Office Hours:  Monday: 10:30 - 6:00  Tuesday: 7:00 - 2:30   Wednesday 7:00 - 2:30  Thursday: 7:30 - 3:00  Friday 7:30 - 3:00  Saturday(every 4th): 7:00-11:00    To review your office visit summary and see your test results, please sign up for the [facility] [email or web address].    
Yes

## 2024-07-26 NOTE — CONSULT NOTE ADULT - ASSESSMENT
JIMMIE SANCHEZ is a 83y Male with a hx of STEMI s/p PCI (mid-LAD, distal circumflex 2018), HTN, and HLD who presents with chest pain. Cardiology consulted for chest pain. JIMMIE SANCHEZ is a 83y Male with a hx of STEMI s/p PCI (mid-LAD, distal circumflex 2018), HFrecEF, HTN, and HLD who presents with chest pain. Cardiology consulted for chest pain.    HAVE NOT ROUNDED    #Chest Pain  #hx of STEMI s/p PCI (mid-LAD, distal circumflex 2018)  Exertional chest pain and dyspnea for past two weeks, resolved with rest. Negative cardiac biomarkers. EKG with 1st degree AV block and without acute changes, same as prior. Lower concern for ACS at this time. TTE 2023 with recovered EF of 70% and no regional wall abnormalities. LHC 2023 with mild disease and patent stents.    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     Recommendations:  - obtain TTE  - continue ASA/Plavix  - continue Lisinopril 20mg qd  - continue Metoprolol succinate 50mg qd    __________  Son Frederick MD PGY-2 JIMMIE SANCHEZ is a 83y Male with a hx of STEMI s/p PCI (mid-LAD, distal circumflex 2018), HFrecEF, HTN, and HLD who presents with chest pain. Cardiology consulted for chest pain.    #Chest Pain  #hx of STEMI s/p PCI (mid-LAD, distal circumflex 2018)  Exertional chest pain and dyspnea for past two weeks, resolved with rest. Negative cardiac biomarkers. EKG with 1st degree AV block and without acute changes, same as prior. Lower concern for ACS at this time. TTE 2023 with recovered EF of 70% and no regional wall abnormalities. LHC 2023 with mild disease and patent stents.    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     Recommendations:  - obtain TTE  - obtain Exercise Nuclear Stress Test  - continue ASA/Plavix  - continue Lisinopril 20mg qd  - continue Metoprolol succinate 50mg qd    __________  Son Frederick MD PGY-2

## 2024-07-26 NOTE — H&P ADULT - HISTORY OF PRESENT ILLNESS
84 yo male PMHx HTN, HLD, STEMI 2018 s/p PCI x 4, status post [facility] 2023 with no PCI done, currently living in [place], here visiting family presents to ED with 2 weeks of exertional chest tightness with shortness of breath.  Symptoms only felt with exertion, alleviated with rest.  Describes as "tightness" to middle of chest radiating to left arm.  Does have some fatigue and mild shortness of breath with exertion as well, none at rest.  Did not take his aspirin this morning.  Denies LE swelling, calf pain, cough, mops, fever/chills, abdominal pain, n/v/d.

## 2024-07-26 NOTE — CONSULT NOTE ADULT - SUBJECTIVE AND OBJECTIVE BOX
CARDIOLOGY CONSULT NOTE    HPI:  JIMMIE SANCHEZ is a 83y Male with a hx of STEMI s/p PCI (mid-LAD, distal circumflex 2018), HTN, and HLD who presents with chest pain. Cardiology consulted for chest pain.      PMH:  Hypertension  Hyperlipidemia    Surgical Hx:  H/O inguinal hernia repair    Allergies:  No Known Allergies    Medications:  aspirin enteric coated 81 milliGRAM(s) Oral daily  atorvastatin 40 milliGRAM(s) Oral at bedtime  clopidogrel Tablet 75 milliGRAM(s) Oral daily  enoxaparin Injectable 40 milliGRAM(s) SubCutaneous every 24 hours  lisinopril 20 milliGRAM(s) Oral daily  metoprolol succinate ER 50 milliGRAM(s) Oral daily    Review of Systems:  General: no fever or weight loss  Skin: no rashes or itch  HEENT: no trauma or HA. No drainage or bleeding. No changes in hearing. No lumps or stiffness.  CV: no chest pain or palpitations  Pulm: no SOB, cough, or wheezing  GI: no nausea, vomiting, or abd pain  Urinary: no incontinence or dysuria  Neuro: no confusion, seizure, or numbness  MSK: no weakness, stiffness, or swelling  Endocrine: no excess thirst, urinary frequency, or heat/cold intolerance  Psych: no depression or anxiety    Vitals:  T(C): 36.5 (07-26-24 @ 12:08), Max: 37 (07-26-24 @ 09:35)  HR: 61 (07-26-24 @ 12:08) (59 - 61)  BP: 132/66 (07-26-24 @ 12:08) (132/66 - 150/84)  RR: 18 (07-26-24 @ 12:08) (12 - 18)  SpO2: 100% (07-26-24 @ 12:08) (99% - 100%)    I/O's:      Physical Exam:  CONSTITUTIONAL: Well-appearing, no apparent distress.  SKIN: No rashes or ecchymosis.  EYES: PERRLA. EOMI. No scleral icterus.  ENT: No JVD. Supple, no thyromegaly. No discharge or glossitis. Oral mucosa with moist membranes. Hearing intact bilaterally.  LYMPH: No lymphadenopathy.  CV: RRR. Normal S1 and S2. No murmurs, rubs, or gallops. No edema. Pulses equal bilaterally.  PULM: Clear to auscultation throughout. Respirations unlabored. No wheezing, rales, or rhonchi.  GI: Soft, non-tender, non-distended. No palpable masses. No hematosplenomegaly. No abnormal bowel sounds.  MSK: Normal gait. No cyanosis or clubbing. Normal ROM. No spinal tenderness.  NEURO: CN II-XII intact. Strength 5/5 throughout. Sensation normal throughout. Reflexes +2 throughout.  PSYCH: A+O x3. Appropriate insight/judgment. Mood and affect appropriate. Recent/remote memory intact.    Labs:                        12.3   6.41  )-----------( 162      ( 26 Jul 2024 10:37 )             36.9     07-26    136  |  102  |  15  ----------------------------<  96  4.1   |  24  |  1.04    Ca    9.2      26 Jul 2024 10:37  Mg     2.0     07-26    TPro  7.3  /  Alb  4.1  /  TBili  0.4  /  DBili  x   /  AST  19  /  ALT  23  /  AlkPhos  79  07-26    Radiology/Procedures:   CARDIOLOGY CONSULT NOTE    HPI:  JIMMIE SANCHEZ is a 83y Male with a hx of STEMI s/p PCI (mid-LAD, distal circumflex 2018), HFrecEF, HTN, and HLD who presents with chest pain. Cardiology consulted for chest pain.    Patient reports having chest pain and dyspnea with exertion for the past 2 weeks. Symptoms resolve with rest. Chest pain radiates to L arm. No palpitations, lightheadedness, swelling, abdominal pain, or diarrhea.    PMH:  Hypertension  Hyperlipidemia    Surgical Hx:  H/O inguinal hernia repair    Allergies:  No Known Allergies    Medications:  aspirin enteric coated 81 milliGRAM(s) Oral daily  atorvastatin 40 milliGRAM(s) Oral at bedtime  clopidogrel Tablet 75 milliGRAM(s) Oral daily  enoxaparin Injectable 40 milliGRAM(s) SubCutaneous every 24 hours  lisinopril 20 milliGRAM(s) Oral daily  metoprolol succinate ER 50 milliGRAM(s) Oral daily    Review of Systems:  General: no fever or weight loss  Skin: no rashes or itch  HEENT: no trauma or HA. No drainage or bleeding. No changes in hearing. No lumps or stiffness.  CV: (+) chest pain. no palpitations  Pulm: (+) SOB. no cough, or wheezing  GI: no nausea, vomiting, or abd pain  Neuro: no confusion, seizure, or numbness  MSK: no weakness, stiffness, or swelling    Vitals:  T(C): 36.5 (24 @ 12:08), Max: 37 (24 @ 09:35)  HR: 61 (24 @ 12:08) (59 - 61)  BP: 132/66 (24 @ 12:08) (132/66 - 150/84)  RR: 18 (24 @ 12:08) (12 - 18)  SpO2: 100% (24 @ 12:08) (99% - 100%)    I/O's:    Physical Exam:  CONSTITUTIONAL: no apparent distress.  SKIN: No rashes or ecchymosis.  EYES: PERRLA. EOMI. No scleral icterus.  ENT: No JVD. Supple. No discharge or glossitis. Oral mucosa with moist membranes. Hearing intact bilaterally.  CV: RRR. Normal S1 and S2. No murmurs, rubs, or gallops. No edema. Pulses equal bilaterally.  PULM: Clear to auscultation throughout. Respirations unlabored. No wheezing, rales, or rhonchi.  GI: Soft, non-tender, non-distended. No palpable masses.  MSK: No cyanosis or clubbing.  NEURO: CN grossly intact  PSYCH: A+O, appropriately communicating and responding to questions    Labs:                        12.3   6.41  )-----------( 162      ( 2024 10:37 )             36.9         136  |  102  |  15  ----------------------------<  96  4.1   |  24  |  1.04    Ca    9.2      2024 10:37  Mg     2.0         TPro  7.3  /  Alb  4.1  /  TBili  0.4  /  DBili  x   /  AST  19  /  ALT  23  /  AlkPhos  79      Troponin: 17 -> 14  BNP: 107    EKst degree AV Block    Radiology/Procedures:  Xray Chest 2 Views PA/Lat 24  IMPRESSION:  Clear lungs.    TTE W or WO Ultrasound Enhancing Agent 23  CONCLUSIONS:   1. Normal left ventricular cavity size. The left ventricular wall thickness is normal. The left ventricular systolic function is hyperdynamic with an ejection fraction visually estimated at 70 to 75 %. There are regional wall motion abnormalities No evidence of a thrombus in the left ventricle.   2. Apical septal segment and apex are abnormal.   3. There is mild (grade 1) left ventricular diastolic dysfunction, with normal filling pressure.   4. Normal atria.   5. Normal right ventricular cavity size, normal right ventricular wall thickness and normal right ventricular systolic function. The tricuspid annular plane systolic excursion (TAPSE) is 2.4 cm (normal>=1.7 cm).   6. No significant valvular disease.   7. No pericardial effusion seen.   8. No prior echocardiogram is available for comparison.   9. There is normal LV mass and concentric remodeling.    Cardiac Catheterization 23  Diagnostic Conclusions:   Patent prior stents. Nonobstructive CAD.      Cardiac Cath Lab - Adult 18  PROCEDURE:  --  Intervention on distal circumflex: drug-eluting stent.    Cardiac Cath Lab - Adult 20  PROCEDURE:  --  Left heart catheterization with ventriculography.  --  Left coronary angiography.  --  Right coronary angiography.  --  Intervention onmid LAD: drug-eluting stent.

## 2024-07-26 NOTE — ED PROVIDER NOTE - OBJECTIVE STATEMENT
82 yo male PMHx HTN, HLD, STEMI 2018 s/p PCI x 4, status post [facility] 2023 with no PCI done, currently living in [facility], here visiting family presents to ED with 2 weeks of exertional chest tightness with shortness of breath.  Symptoms only felt with exertion, alleviated with rest.  Describes as "tightness" to middle of chest radiating to left arm.  Does have some fatigue and mild shortness of breath with exertion as well, none at rest.  Did not take his aspirin this morning.  Denies LE swelling, calf pain, cough, mops, fever/chills, abdominal pain, n/v/d.

## 2024-07-26 NOTE — PATIENT PROFILE ADULT - FALL HARM RISK - UNIVERSAL INTERVENTIONS
Bed in lowest position, wheels locked, appropriate side rails in place/Call bell, personal items and telephone in reach/Instruct patient to call for assistance before getting out of bed or chair/Non-slip footwear when patient is out of bed/[place] to call system/Physically safe environment - no spills, clutter or unnecessary equipment/Purposeful Proactive Rounding/Room/bathroom lighting operational, light cord in reach

## 2024-07-26 NOTE — H&P ADULT - NSHPPHYSICALEXAM_GEN_ALL_CORE
Vital Signs Last 24 Hrs  T(C): 36.5 (26 Jul 2024 12:08), Max: 37 (26 Jul 2024 09:35)  T(F): 97.7 (26 Jul 2024 12:08), Max: 98.6 (26 Jul 2024 09:35)  HR: 61 (26 Jul 2024 12:08) (59 - 61)  BP: 132/66 (26 Jul 2024 12:08) (132/66 - 150/84)  BP(mean): --  RR: 18 (26 Jul 2024 12:08) (12 - 18)  SpO2: 100% (26 Jul 2024 12:08) (99% - 100%)    Parameters below as of 26 Jul 2024 12:08  Patient On (Oxygen Delivery Method): room air Vital Signs Last 24 Hrs  T(C): 36.5 (26 Jul 2024 12:08), Max: 37 (26 Jul 2024 09:35)  T(F): 97.7 (26 Jul 2024 12:08), Max: 98.6 (26 Jul 2024 09:35)  HR: 61 (26 Jul 2024 12:08) (59 - 61)  BP: 132/66 (26 Jul 2024 12:08) (132/66 - 150/84)  BP(mean): --  RR: 18 (26 Jul 2024 12:08) (12 - 18)  SpO2: 100% (26 Jul 2024 12:08) (99% - 100%)    Parameters below as of 26 Jul 2024 12:08  Patient On (Oxygen Delivery Method): room air    heent : nc/at , no palor   neck ; supple, no JVD  lungs : B/L fair A/E, no w/r/r  heart: s1s2 nml  abd : soft, NABS, NT/ND  ext : no e/c/c, pulses 1 +  neuro: aaox3 , no focal deficit

## 2024-07-26 NOTE — ED PROVIDER NOTE - PROGRESS NOTE DETAILS
Inpatient Hepatology Transfer of Care    Date: 1/10/2021    Referring Provider: Patrick Szymanski MD    CC/Reason for Consult: Elevated LFTs    HPI:  I was asked to see Elzbieta Bates at the request of Dr. Szymanski for Hepatology transfer of care. Patient is a 80 year old female who was admitted with Dehydration [E86.0]  Acute kidney injury (CMS/HCC) [N17.9]  Pancreatic tumor [D49.0]  Biliary obstruction due to cancer (CMS/HCC) [K83.1, C80.1].    Ms. Bates is a 81 y/o  female with H/O HLD, GERD, HTN, depression, pancreatic CA with obstructive jaundice s/p PTC.  Patient presented to [facility] ED 12/30/20 after Oncology appt revealed hyperbilirubinemia, leukocytosis, and ANDREY.  ED labs with leukocytosis, ongoing elevated LFTs.  Patient was noted to be hypotensive, fluid responsive.  CT A/P with new moderate sized collected centered about the oumou hepatis.  Patient was admitted to ICU for further management, IR consulted and PTC upsized and exchanged, cholangiogram noting high grade stenosis on 12/31/20.  Patient underwent thoracentesis 1/3/21 and 1/4/21.    Hepatology consulted due to ongoing elevated LFTs.  Patient denies any h/o liver disease / hepatitis.  No new OTC supplements, herbal remedies, EtOH abuse.     Patient Active Problem List    Diagnosis Date Noted   • Acid reflux 06/03/2015     Priority: Low   • HTN (hypertension) 11/11/2014     Priority: Low   • Hyperlipidemia 11/11/2014     Priority: Low   • Major depressive disorder, recurrent episode, mild (CMS/HCC) 11/11/2014     Priority: Low   • Thickened endometrium 06/18/2014     Priority: Low   • Pelvic pain in female 06/12/2014     Priority: Low   • Acute pharyngitis 06/27/2013     Priority: Low     Past Medical History:   Diagnosis Date   • Depression    • Essential (primary) hypertension    • Gastroesophageal reflux disease    • High cholesterol    • Malignant neoplasm (CMS/HCC)     Pancreatic Cancer   • Seasonal allergies      Past Surgical History:    Procedure Laterality Date   • Bladder surgery      unsure of year   • Cholecystectomy      unsure of year   • Colonoscopy  12/05/2011   • D and c  06/27/2014   • Dexa bone density axial skeleton  11/16/2010   • Endometrial biopsy  06/18/2014    attempted   • Ercp  12/22/2020    Unable to perform biliary decompression because of a completely effaced ampulla likely secondary to the underlying mass   • Ercp      failed stent placement, bili drain placed   • Gi endoscopic ultrasound  12/22/2020    Dr. Molina: Well-defined hypoechoic mass within the pancreatic head status post fine-needle biopsy   • Hysterectomy  10/17/2014   • Hysteroscopy  06/27/2014   • Knee surgery  2005    left knee surgery after a fall     Prior to Admission medications    Medication Sig Start Date End Date Taking? Authorizing Provider   HYDROcodone-acetaminophen (NORCO) 5-325 MG per tablet Take 1 tablet by mouth every 6 hours as needed for Pain. 12/28/20  Yes Al Sánchez MD   sennosides-docusate sodium (SENOKOT-S) 8.6-50 MG tablet Take 1 tablet by mouth daily. 12/28/20  Yes Al Sánchez MD   LYSINE PO Take 1 tablet by mouth daily.    Yes Outside Provider   fluoxetine (PROzac) 40 MG capsule TAKE 1 CAPSULE EVERY DAY 11/25/20  Yes Al Sánchez MD   omeprazole (PRILOSEC) 40 MG capsule TAKE 1 CAPSULE EVERY DAY 7/16/20  Yes Al Sánchez MD   Cholecalciferol (VITAMIN D-3) 5000 units Tab Take 5,000 Units by mouth daily. 3/15/18  Yes Al Sánchez MD   Multiple Vitamins-Minerals (MULTIVITAMIN PO) Take 1 tablet by mouth daily.   Yes Outside Provider   cetirizine (ZYRTEC) 10 MG tablet Take 10 mg by mouth daily.   Yes Outside Provider   Sodium Chloride Flush (normal saline flush) 0.9 % injectable solution Flush JESICA suction drain only with half the contents of 1 syringe (5 mL) 2 times daily. Use new syringe each time drain is flushed and discard remaining solution. 1/8/21   PARISH Salcedo   ondansetron (Zofran) 4 MG tablet Take 1 tablet by  mouth every 8 hours as needed for Nausea. 20   Al Sánchez MD   ondansetron (Zofran ODT) 4 MG disintegrating tablet Place 1 tablet onto the tongue every 6 hours. 12/15/20   Angelina Dia PA-C   metoPROLOL succinate (TOPROL-XL) 25 MG 24 hr tablet TAKE 1 TABLET DAILY FOR HIGH BLOOD PRESSURE 20   Al Sánchez MD   neomycin-polymyxin-HC 1 % otic solution Place 4 drops into right ear 3 times daily.  Patient taking differently: Place 4 drops into right ear 3 times daily. Indications: uses prn  20   Al Sánchez MD   fluticasone (FLONASE ALLERGY RELIEF) 50 MCG/ACT nasal spray Spray 2 sprays in each nostril daily.  Patient taking differently: Spray 2 sprays in each nostril daily. Indications: uses prn  7/3/19   Al Sánchez MD   carbamide peroxide (DEBROX) 6.5 % otic solution Place 5 drops into both ears 2 times daily.  Patient taking differently: Place 5 drops into both ears as needed.  17   Al Sánchez MD     ALLERGIES:  No Known Allergies  Social History     Tobacco Use   • Smoking status: Former Smoker     Packs/day: 0.00     Years: 1.00     Pack years: 0.00     Types: Cigarettes     Quit date: 1966     Years since quittin.0   • Smokeless tobacco: Never Used   • Tobacco comment: 1 pack a week for about a year   Substance Use Topics   • Alcohol use: No     Alcohol/week: 0.0 standard drinks     Family History   Problem Relation Age of Onset   • Hypertension Mother    • Other Mother         alzhiemers   • Other Father         unknown hx       Review of Systems  General: no fevers, chills, decreased appetite, or unintentional weight loss.  Eyes: negative for visual blurring, double vision and eye pain.  ENT: no headaches, negative for sinus trouble, persistent sore throat and hoarseness.  Cardiovascular: negative for palpitations, chest pain, exertional chest pain or pressure, lower extremity edema and ERNST.  Respiratory: negative for cough, shortness of breath, sputum and  hemoptysis.  GI: +abdominal pain; negative for nausea, heartburn or reflux, hematemesis,  melena, hematochezia, constipation and diarrhea.  : negative for nocturia, frequency and urgency.  Musculoskeletal: negative for back pain and muscular weakness.  Integumentary: negative for pigmentation, rash, itching and bruising.  Neurological: negative for syncope and dizziness or lightheadedness.  Psychiatric: negative for anxiety and depression.  Endocrine: negative for thyroid disorder, cold intolerance and heat intolerance.  Hematologic: negative for anemia, easy bruising and bleeding disorder.    Objective:  Vitals:    01/10/21 0909   BP:    Pulse:    Resp: 18   Temp:        Physical Exam:  General Appearance: Alert, cooperative, no distress, appears stated age, chronically ill.   Head: Normocephalic, without obvious abnormality, atraumatic.   Eyes: Sclera anicteric bilaterally, EOM's intact bilateral.   Lungs: Clear to auscultation bilaterally, respirations unlabored.   Heart: Regular rate and rhythm, S1 and S2 normal, no murmur, rub or gallop.   Abdomen: Soft, tender to RUQ at drain site, bowel sounds active all four quadrants, no masses, no appreciable organomegaly. PTC with bile drainage.  Extremities: No discolorations, no cyanosis, no peripheral edema.   Skin: Skin color, texture and turgor normal, no rashes.   Psychiatric: Alert & oriented x3. Mood and affect appropriate.   Neurologic: No focal neuro deficits, normal strength and sensation throughout.     Labs:  Reviewed    Imaging and other studies:   1/4/21 MRI Abdomen  -No definite discrete pancreatic head mass identified. However, a very  distal common duct stricture at the level of the pancreatic head persists,  internal-external biliary drain remains in place.  -9 mm posterior right lobe liver lesion. While this demonstrates imaging  characteristics suggestive of a cyst, was not definitely present on a  recent MRCP, and remains suspicious for metastasis  in this setting. No  other definite focal lesion (however, liver imaging is quite degraded by  patient motion artifact).  -Increasing perihepatic/subcapsular fluid collection, which appears  relatively simple (no evidence for abscess). Oumou hepatis fluid is now  more well-defined, with a couple of discrete collections. These oumou  hepatis collections (probable acute peripancreatic collections/developing  pseudocysts) demonstrate some restricted diffusion, indicating possible  superinfection, or other nonspecific cellular, hemorrhagic and/or  proteinaceous debris. Small simple perigastric collection, as well.  1/5/21 IR Liver Biopsy - FNA with needle core biopsy:   -Fragments of benign hepatic tissue with bile stasis and mild bile ductular proliferation with chronic inflammation, compatible with biliary obstruction.  -Small portion of loose vascular hyalinized tissue.  -Negative for granulomatous inflammation and malignancy.    Assessment/Plan:  1. Elevated LFTs, likely 2/2 obstructive jaundice.  -- DDx Obstruction, hepatitis.  -- S/P PTC exchange and upsizing on 12/31 with down trending LFTs  -- Liver biopsy favoring biliary obstruction  -- Obtain chronic hepatitis panel.  -- Monitor LFTs.  -- MRI Abdomen noted above.  -- Consider US Liver w doppler if LFTs trend back up.  2. 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  -- S/P PTC as above.  -- Surg onc following.  -- Oncology following.  -- Management per Attending.    Discussed with: patient.    Hansa Maritza CAGLE  Abdominal Transplant & Hepatology  Pager: [phone]  1/10/2021         I have seen, examined, and have evaluated this patient.  Please see above notes by the JOSSELIN that I have reviewed and added to.   I attest that I performed the work for this encounter and agree with the above documentation completed.        Reginald Hurd MD  Hepatology       Initially discussed case with unattached hospitalist who advised that patient was admitted to Dr. Fairbanks last year and recommended calling him.  I spoke to Dr. Fairbanks who accepted patient under service for further cardiac workup as inpatient. - Jaylen Pitt PA-C

## 2024-07-26 NOTE — ED ADULT NURSE NOTE - OBJECTIVE STATEMENT
Pt c/o "feeling some tightness to chest with exertion over the past week or so, no tightness at rest. No SOB/n/v/sweating with episodes. Some discomfort to left arm at times with certain movements"

## 2024-07-26 NOTE — H&P ADULT - NSHPLABSRESULTS_GEN_ALL_CORE
12.3   6.41  )-----------( 162      ( 26 Jul 2024 10:37 )             36.9   07-26    136  |  102  |  15  ----------------------------<  96  4.1   |  24  |  1.04    Ca    9.2      26 Jul 2024 10:37  Mg     2.0     07-26    TPro  7.3  /  Alb  4.1  /  TBili  0.4  /  DBili  x   /  AST  19  /  ALT  23  /  AlkPhos  79  07-26

## 2024-07-26 NOTE — ED ADULT TRIAGE NOTE - CHIEF COMPLAINT QUOTE
chest pain and tightness for a week now, h/o 4 stents, on Plavix and aspirin. Tightness worse with walking, feels better when at rest.

## 2024-07-27 LAB
ALBUMIN SERPL ELPH-MCNC: 3.9 G/DL — SIGNIFICANT CHANGE UP (ref 3.3–5)
ALP SERPL-CCNC: 74 U/L — SIGNIFICANT CHANGE UP (ref 40–120)
ALT FLD-CCNC: 22 U/L — SIGNIFICANT CHANGE UP (ref 10–45)
ANION GAP SERPL CALC-SCNC: 11 MMOL/L — SIGNIFICANT CHANGE UP (ref 5–17)
AST SERPL-CCNC: 20 U/L — SIGNIFICANT CHANGE UP (ref 10–40)
BILIRUB SERPL-MCNC: 0.6 MG/DL — SIGNIFICANT CHANGE UP (ref 0.2–1.2)
BUN SERPL-MCNC: 15 MG/DL — SIGNIFICANT CHANGE UP (ref 7–23)
CALCIUM SERPL-MCNC: 9.3 MG/DL — SIGNIFICANT CHANGE UP (ref 8.4–10.5)
CHLORIDE SERPL-SCNC: 102 MMOL/L — SIGNIFICANT CHANGE UP (ref 96–108)
CHOLEST SERPL-MCNC: 111 MG/DL — SIGNIFICANT CHANGE UP
CO2 SERPL-SCNC: 23 MMOL/L — SIGNIFICANT CHANGE UP (ref 22–31)
CREAT SERPL-MCNC: 1.02 MG/DL — SIGNIFICANT CHANGE UP (ref 0.5–1.3)
EGFR: 74 ML/MIN/1.73M2 — SIGNIFICANT CHANGE UP
GLUCOSE SERPL-MCNC: 88 MG/DL — SIGNIFICANT CHANGE UP (ref 70–99)
HCT VFR BLD CALC: 35.9 % — LOW (ref 39–50)
HDLC SERPL-MCNC: 41 MG/DL — SIGNIFICANT CHANGE UP
HGB BLD-MCNC: 12.1 G/DL — LOW (ref 13–17)
LIPID PNL WITH DIRECT LDL SERPL: 56 MG/DL — SIGNIFICANT CHANGE UP
MCHC RBC-ENTMCNC: 32.3 PG — SIGNIFICANT CHANGE UP (ref 27–34)
MCHC RBC-ENTMCNC: 33.7 GM/DL — SIGNIFICANT CHANGE UP (ref 32–36)
MCV RBC AUTO: 95.7 FL — SIGNIFICANT CHANGE UP (ref 80–100)
NON HDL CHOLESTEROL: 69 MG/DL — SIGNIFICANT CHANGE UP
NRBC # BLD: 0 /100 WBCS — SIGNIFICANT CHANGE UP (ref 0–0)
PLATELET # BLD AUTO: 156 K/UL — SIGNIFICANT CHANGE UP (ref 150–400)
POTASSIUM SERPL-MCNC: 4.4 MMOL/L — SIGNIFICANT CHANGE UP (ref 3.5–5.3)
POTASSIUM SERPL-SCNC: 4.4 MMOL/L — SIGNIFICANT CHANGE UP (ref 3.5–5.3)
PROT SERPL-MCNC: 6.9 G/DL — SIGNIFICANT CHANGE UP (ref 6–8.3)
RBC # BLD: 3.75 M/UL — LOW (ref 4.2–5.8)
RBC # FLD: 12 % — SIGNIFICANT CHANGE UP (ref 10.3–14.5)
SODIUM SERPL-SCNC: 136 MMOL/L — SIGNIFICANT CHANGE UP (ref 135–145)
TRIGL SERPL-MCNC: 60 MG/DL — SIGNIFICANT CHANGE UP
TSH SERPL-MCNC: 1.53 UIU/ML — SIGNIFICANT CHANGE UP (ref 0.27–4.2)
WBC # BLD: 5.77 K/UL — SIGNIFICANT CHANGE UP (ref 3.8–10.5)
WBC # FLD AUTO: 5.77 K/UL — SIGNIFICANT CHANGE UP (ref 3.8–10.5)

## 2024-07-27 RX ADMIN — ENOXAPARIN SODIUM 40 MILLIGRAM(S): 120 INJECTION SUBCUTANEOUS at 16:58

## 2024-07-27 RX ADMIN — Medication 50 MILLIGRAM(S): at 05:21

## 2024-07-27 RX ADMIN — LISINOPRIL 20 MILLIGRAM(S): 10 TABLET ORAL at 05:22

## 2024-07-27 RX ADMIN — ATORVASTATIN CALCIUM 40 MILLIGRAM(S): 40 TABLET, FILM COATED ORAL at 21:22

## 2024-07-27 RX ADMIN — CLOPIDOGREL BISULFATE 75 MILLIGRAM(S): 75 TABLET, FILM COATED ORAL at 11:35

## 2024-07-27 RX ADMIN — Medication 81 MILLIGRAM(S): at 11:35

## 2024-07-27 NOTE — PROGRESS NOTE ADULT - SUBJECTIVE AND OBJECTIVE BOX
Patient is a 83y old  Male who presents with a chief complaint of Chest Pain (26 Jul 2024 14:38)      INTERVAL HPI/OVERNIGHT EVENTS: denies any CP  T(C): 36.5 (07-27-24 @ 21:06), Max: 36.6 (07-27-24 @ 04:38)  HR: 61 (07-27-24 @ 21:06) (61 - 64)  BP: 124/68 (07-27-24 @ 21:06) (124/68 - 146/83)  RR: 18 (07-27-24 @ 21:06) (18 - 18)  SpO2: 100% (07-27-24 @ 21:06) (100% - 100%)  Wt(kg): --  I&O's Summary      PAST MEDICAL & SURGICAL HISTORY:  Hypertension      Hyperlipidemia      H/O inguinal hernia repair  Right Inguinal Hernia x2  First in 1966'  Second time with mesh in 2010          SOCIAL HISTORY  Alcohol:  Tobacco:  Illicit substance use:    FAMILY HISTORY:    REVIEW OF SYSTEMS:  CONSTITUTIONAL: No fever, weight loss, or fatigue  EYES: No eye pain, visual disturbances, or discharge  ENMT:  No difficulty hearing, tinnitus, vertigo; No sinus or throat pain  NECK: No pain or stiffness  RESPIRATORY: No cough, wheezing, chills or hemoptysis; No shortness of breath  CARDIOVASCULAR: No chest pain, palpitations, dizziness, or leg swelling  GASTROINTESTINAL: No abdominal or epigastric pain. No nausea, vomiting, or hematemesis; No diarrhea or constipation. No melena or hematochezia.  GENITOURINARY: No dysuria, frequency, hematuria, or incontinence  NEUROLOGICAL: No headaches, memory loss, loss of strength, numbness, or tremors  SKIN: No itching, burning, rashes, or lesions   LYMPH NODES: No enlarged glands  ENDOCRINE: No heat or cold intolerance; No hair loss  MUSCULOSKELETAL: No joint pain or swelling; No muscle, back, or extremity pain  PSYCHIATRIC: No depression, anxiety, mood swings, or difficulty sleeping  HEME/LYMPH: No easy bruising, or bleeding gums  ALLERY AND IMMUNOLOGIC: No hives or eczema    RADIOLOGY & ADDITIONAL TESTS:    Imaging Personally Reviewed:  [ ] YES  [ ] NO    Consultant(s) Notes Reviewed:  [ ] YES  [ ] NO    PHYSICAL EXAM:  GENERAL: NAD, well-groomed, well-developed  HEAD:  Atraumatic, Normocephalic  EYES: EOMI, PERRLA, conjunctiva and sclera clear  ENMT: No tonsillar erythema, exudates, or enlargement; Moist mucous membranes, Good dentition, No lesions  NECK: Supple, No JVD, Normal thyroid  NERVOUS SYSTEM:  Alert & Oriented X3, Good concentration; Motor Strength 5/5 B/L upper and lower extremities; DTRs 2+ intact and symmetric  CHEST/LUNG: Clear to percussion bilaterally; No rales, rhonchi, wheezing, or rubs  HEART: Regular rate and rhythm; No murmurs, rubs, or gallops  ABDOMEN: Soft, Nontender, Nondistended; Bowel sounds present  EXTREMITIES:  2+ Peripheral Pulses, No clubbing, cyanosis, or edema  LYMPH: No lymphadenopathy noted  SKIN: No rashes or lesions    LABS:                        12.1   5.77  )-----------( 156      ( 27 Jul 2024 06:52 )             35.9     07-27    136  |  102  |  15  ----------------------------<  88  4.4   |  23  |  1.02    Ca    9.3      27 Jul 2024 06:52  Mg     2.0     07-26    TPro  6.9  /  Alb  3.9  /  TBili  0.6  /  DBili  x   /  AST  20  /  ALT  22  /  AlkPhos  74  07-27    PT/INR - ( 26 Jul 2024 10:37 )   PT: 12.2 sec;   INR: 1.17 ratio         PTT - ( 26 Jul 2024 10:37 )  PTT:30.1 sec  Urinalysis Basic - ( 27 Jul 2024 06:52 )    Color: x / Appearance: x / SG: x / pH: x  Gluc: 88 mg/dL / Ketone: x  / Bili: x / Urobili: x   Blood: x / Protein: x / Nitrite: x   Leuk Esterase: x / RBC: x / WBC x   Sq Epi: x / Non Sq Epi: x / Bacteria: x      CAPILLARY BLOOD GLUCOSE            Urinalysis Basic - ( 27 Jul 2024 06:52 )    Color: x / Appearance: x / SG: x / pH: x  Gluc: 88 mg/dL / Ketone: x  / Bili: x / Urobili: x   Blood: x / Protein: x / Nitrite: x   Leuk Esterase: x / RBC: x / WBC x   Sq Epi: x / Non Sq Epi: x / Bacteria: x        MEDICATIONS  (STANDING):  aspirin enteric coated 81 milliGRAM(s) Oral daily  atorvastatin 40 milliGRAM(s) Oral at bedtime  clopidogrel Tablet 75 milliGRAM(s) Oral daily  enoxaparin Injectable 40 milliGRAM(s) SubCutaneous every 24 hours  lisinopril 20 milliGRAM(s) Oral daily  metoprolol succinate ER 50 milliGRAM(s) Oral daily    MEDICATIONS  (PRN):      Care Discussed with Consultants/Other Providers [ ] YES  [ ] NO

## 2024-07-28 PROCEDURE — 78452 HT MUSCLE IMAGE SPECT MULT: CPT | Mod: 26

## 2024-07-28 PROCEDURE — 93018 CV STRESS TEST I&R ONLY: CPT

## 2024-07-28 PROCEDURE — 93016 CV STRESS TEST SUPVJ ONLY: CPT

## 2024-07-28 RX ADMIN — Medication 81 MILLIGRAM(S): at 14:22

## 2024-07-28 RX ADMIN — ENOXAPARIN SODIUM 40 MILLIGRAM(S): 120 INJECTION SUBCUTANEOUS at 17:32

## 2024-07-28 RX ADMIN — ATORVASTATIN CALCIUM 40 MILLIGRAM(S): 40 TABLET, FILM COATED ORAL at 21:10

## 2024-07-28 RX ADMIN — LISINOPRIL 20 MILLIGRAM(S): 10 TABLET ORAL at 05:27

## 2024-07-28 RX ADMIN — Medication 50 MILLIGRAM(S): at 05:27

## 2024-07-28 RX ADMIN — CLOPIDOGREL BISULFATE 75 MILLIGRAM(S): 75 TABLET, FILM COATED ORAL at 14:23

## 2024-07-28 NOTE — PROGRESS NOTE ADULT - SUBJECTIVE AND OBJECTIVE BOX
Medicine Progress Note    Patient is a 83y old  Male who presents with a chief complaint of chest pain (27 Jul 2024 15:38)      SUBJECTIVE / OVERNIGHT EVENTS: seen and examined, denies any CP , says he went for stress test this morning       ADDITIONAL REVIEW OF SYSTEMS:    MEDICATIONS  (STANDING):  aspirin enteric coated 81 milliGRAM(s) Oral daily  atorvastatin 40 milliGRAM(s) Oral at bedtime  clopidogrel Tablet 75 milliGRAM(s) Oral daily  enoxaparin Injectable 40 milliGRAM(s) SubCutaneous every 24 hours  lisinopril 20 milliGRAM(s) Oral daily  metoprolol succinate ER 50 milliGRAM(s) Oral daily    MEDICATIONS  (PRN):    CAPILLARY BLOOD GLUCOSE        I&O's Summary      PHYSICAL EXAM:  Vital Signs Last 24 Hrs  T(C): 36.4 (28 Jul 2024 14:23), Max: 36.5 (27 Jul 2024 21:06)  T(F): 97.6 (28 Jul 2024 14:23), Max: 97.7 (27 Jul 2024 21:06)  HR: 70 (28 Jul 2024 14:23) (61 - 70)  BP: 155/75 (28 Jul 2024 14:23) (124/68 - 155/75)  BP(mean): --  RR: 17 (28 Jul 2024 14:23) (17 - 18)  SpO2: 100% (28 Jul 2024 14:23) (100% - 100%)    Parameters below as of 28 Jul 2024 14:23  Patient On (Oxygen Delivery Method): room air      CONSTITUTIONAL: NAD, well-developed, well-groomed  ENMT: Moist oral mucosa, no pharyngeal injection or exudates; normal dentition  RESPIRATORY: Normal respiratory effort; lungs are clear to auscultation bilaterally  CARDIOVASCULAR: Regular rate and rhythm, normal S1 and S2, no murmur/rub/gallop; No lower extremity edema; Peripheral pulses are 2+ bilaterally  ABDOMEN: Nontender to palpation, normoactive bowel sounds, no rebound/guarding; No hepatosplenomegaly  PSYCH: A+O to person, place, and time; affect appropriate  NEUROLOGY: CN 2-12 are intact and symmetric; no gross sensory deficits   SKIN: No rashes; no palpable lesions    LABS:                        12.1   5.77  )-----------( 156      ( 27 Jul 2024 06:52 )             35.9     07-27    136  |  102  |  15  ----------------------------<  88  4.4   |  23  |  1.02    Ca    9.3      27 Jul 2024 06:52    TPro  6.9  /  Alb  3.9  /  TBili  0.6  /  DBili  x   /  AST  20  /  ALT  22  /  AlkPhos  74  07-27          Urinalysis Basic - ( 27 Jul 2024 06:52 )    Color: x / Appearance: x / SG: x / pH: x  Gluc: 88 mg/dL / Ketone: x  / Bili: x / Urobili: x   Blood: x / Protein: x / Nitrite: x   Leuk Esterase: x / RBC: x / WBC x   Sq Epi: x / Non Sq Epi: x / Bacteria: x            RADIOLOGY & ADDITIONAL TESTS:  Imaging from Last 24 Hours:    Electrocardiogram/QTc Interval:    COORDINATION OF CARE:  Care Discussed with Consultants/Other Providers:

## 2024-07-28 NOTE — PROGRESS NOTE ADULT - ASSESSMENT
A/P     Chest pain r/o ACS   -c/w asa/plavix   seen by cardio   as per pt. he went for NST today morning , I don't see any result     CAD s/p stents   -had LHC in 2023 at this hospital : advised for medical managment   no new stents were placed   check echo     HTN :   c/w lisinopril and Toprol XL     HLD  -c/w lipitor     dispo: as per pt. he went for NST this morning, f/u result, if negative then d/c planning home in am   
A/P     Chest pain r/o ACS   -c/w asa/plavix   seen by cardio   plan for NST on Sunday    CAD s/p stents   -had LHC in 2023 at this hospital : advised for medical managment   no new stents were placed   check echo     HTN :   c/w lisinopril and Toprol XL     HLD  -c/w lipitor     dispo: scheduled for NST on sunday as per cardio

## 2024-07-29 ENCOUNTER — TRANSCRIPTION ENCOUNTER (OUTPATIENT)
Age: 84
End: 2024-07-29

## 2024-07-29 VITALS
SYSTOLIC BLOOD PRESSURE: 135 MMHG | TEMPERATURE: 98 F | OXYGEN SATURATION: 98 % | HEART RATE: 63 BPM | DIASTOLIC BLOOD PRESSURE: 75 MMHG | RESPIRATION RATE: 18 BRPM

## 2024-07-29 PROCEDURE — 85610 PROTHROMBIN TIME: CPT

## 2024-07-29 PROCEDURE — 93017 CV STRESS TEST TRACING ONLY: CPT

## 2024-07-29 PROCEDURE — 71046 X-RAY EXAM CHEST 2 VIEWS: CPT

## 2024-07-29 PROCEDURE — 84443 ASSAY THYROID STIM HORMONE: CPT

## 2024-07-29 PROCEDURE — 78452 HT MUSCLE IMAGE SPECT MULT: CPT | Mod: MC

## 2024-07-29 PROCEDURE — 83880 ASSAY OF NATRIURETIC PEPTIDE: CPT

## 2024-07-29 PROCEDURE — 80053 COMPREHEN METABOLIC PANEL: CPT

## 2024-07-29 PROCEDURE — 85027 COMPLETE CBC AUTOMATED: CPT

## 2024-07-29 PROCEDURE — 85730 THROMBOPLASTIN TIME PARTIAL: CPT

## 2024-07-29 PROCEDURE — C8929: CPT

## 2024-07-29 PROCEDURE — 85025 COMPLETE CBC W/AUTO DIFF WBC: CPT

## 2024-07-29 PROCEDURE — 99285 EMERGENCY DEPT VISIT HI MDM: CPT | Mod: 25

## 2024-07-29 PROCEDURE — 80061 LIPID PANEL: CPT

## 2024-07-29 PROCEDURE — 84484 ASSAY OF TROPONIN QUANT: CPT

## 2024-07-29 PROCEDURE — A9500: CPT

## 2024-07-29 PROCEDURE — 96372 THER/PROPH/DIAG INJ SC/IM: CPT

## 2024-07-29 PROCEDURE — 83735 ASSAY OF MAGNESIUM: CPT

## 2024-07-29 RX ORDER — ASPIRIN 500 MG
1 TABLET ORAL
Qty: 90 | Refills: 0
Start: 2024-07-29 | End: 2024-10-26

## 2024-07-29 RX ORDER — CYANOCOBALAMIN 1000 UG/ML
1 INJECTION, SOLUTION INTRAMUSCULAR; SUBCUTANEOUS
Refills: 0 | DISCHARGE

## 2024-07-29 RX ORDER — CYANOCOBALAMIN 1000 UG/ML
1 INJECTION, SOLUTION INTRAMUSCULAR; SUBCUTANEOUS
Qty: 90 | Refills: 0
Start: 2024-07-29 | End: 2024-10-26

## 2024-07-29 RX ORDER — LISINOPRIL 10 MG/1
1 TABLET ORAL
Qty: 90 | Refills: 0
Start: 2024-07-29 | End: 2024-10-26

## 2024-07-29 RX ORDER — ATORVASTATIN CALCIUM 40 MG/1
1 TABLET, FILM COATED ORAL
Qty: 90 | Refills: 0
Start: 2024-07-29 | End: 2024-10-26

## 2024-07-29 RX ORDER — METOPROLOL TARTRATE 100 MG
1 TABLET ORAL
Qty: 90 | Refills: 0
Start: 2024-07-29 | End: 2024-10-26

## 2024-07-29 RX ORDER — CLOPIDOGREL BISULFATE 75 MG/1
1 TABLET, FILM COATED ORAL
Qty: 90 | Refills: 0
Start: 2024-07-29 | End: 2024-10-26

## 2024-07-29 RX ORDER — LISINOPRIL 10 MG/1
1 TABLET ORAL
Refills: 0 | DISCHARGE

## 2024-07-29 RX ORDER — LISINOPRIL 10 MG/1
1 TABLET ORAL
Qty: 30 | Refills: 0
Start: 2024-07-29 | End: 2024-08-27

## 2024-07-29 RX ADMIN — Medication 81 MILLIGRAM(S): at 11:32

## 2024-07-29 RX ADMIN — LISINOPRIL 20 MILLIGRAM(S): 10 TABLET ORAL at 06:12

## 2024-07-29 RX ADMIN — Medication 50 MILLIGRAM(S): at 06:12

## 2024-07-29 RX ADMIN — CLOPIDOGREL BISULFATE 75 MILLIGRAM(S): 75 TABLET, FILM COATED ORAL at 11:32

## 2024-07-29 NOTE — DISCHARGE NOTE PROVIDER - CARE PROVIDER_API CALL
Patrick Fairbanks  Internal Medicine  [address] [place] [id number]  Phone: [phone]  Fax: [phone]  Established Patient  Follow Up Time: 1 week   Patrick Fairbanks  Internal Medicine  [address] [place] [id number]  Phone: [phone]  Fax: [phone]  Established Patient  Follow Up Time: 1 week    Cardiology Clinic,   [address] [place] [address] Drive  [address] [place] [address]  Phone: [phone]  Fax: (   )    -  Follow Up Time: 2 weeks

## 2024-07-29 NOTE — DISCHARGE NOTE PROVIDER - PROVIDER RX CONTACT NUMBER
Your Medical Assistant today was Gina GRAVES.     Your Opinion Matters To Us!  You may receive a survey via mail or e-mail and we would appreciate your feedback. Our organization uses this information to assess our performance as we strive to provide exceptional care. We truly value and appreciate your feedback. Thank you in advance for taking the time to do this. It was a pleasure to take care of you today.   We are committed to providing our patients with their test results in a timely manner. If you have access to [employer], you will receive your results within 5 to 7 days. If your results are normal, a member of our office may call you or you may receive a letter in the mail within 10 to 15 days of your testing. If your results have something additional to discuss, a member of our office will contact you by phone. If at any time you have questions related your results, please feel free to call our office at [phone]     [phone]

## 2024-07-29 NOTE — DISCHARGE NOTE PROVIDER - NSDCMRMEDTOKEN_GEN_ALL_CORE_FT
aspirin 81 mg oral tablet, chewable: 1 tab(s) orally once a day  atorvastatin 40 mg oral tablet: 1 tab(s) orally once a day (at bedtime)  clopidogrel 75 mg oral tablet: 1 tab(s) orally once a day  cyanocobalamin 1000 mcg oral tablet: 1 tab(s) orally once a day  lisinopril 20 mg oral tablet: 1 tab(s) orally once a day  metoprolol succinate 50 mg oral tablet, extended release: 1 tab(s) orally once a day   aspirin 81 mg oral tablet, chewable: 1 tab(s) orally once a day MDD: 1  atorvastatin 40 mg oral tablet: 1 tab(s) orally once a day (at bedtime) MDD: 1  clopidogrel 75 mg oral tablet: 1 tab(s) orally once a day MDD: 1  cyanocobalamin 1000 mcg oral tablet: 1 tab(s) orally once a day MDD: 1  lisinopril 20 mg oral tablet: 1 tab(s) orally once a day MDD: 1  metoprolol succinate 50 mg oral tablet, extended release: 1 tab(s) orally once a day MDD: 1

## 2024-07-29 NOTE — CHART NOTE - NSCHARTNOTEFT_GEN_A_CORE
Patient is a 83y old  Male who presents with a chief complaint of chest pain (29 Jul 2024 13:44)  Pt s/p nuclear stress test.  No complaints at present.     Vital Signs Last 24 Hrs  T(C): 36.8 (29 Jul 2024 11:47), Max: 36.8 (29 Jul 2024 11:47)  T(F): 98.2 (29 Jul 2024 11:47), Max: 98.2 (29 Jul 2024 11:47)  HR: 63 (29 Jul 2024 11:47) (63 - 71)  BP: 135/75 (29 Jul 2024 11:47) (124/77 - 149/83)  BP(mean): --  RR: 18 (29 Jul 2024 11:47) (16 - 18)  SpO2: 98% (29 Jul 2024 11:47) (98% - 99%)    Parameters below as of 29 Jul 2024 11:47  Patient On (Oxygen Delivery Method): room air    Gen: 82 y/o M wd/wn in nad.   Skin: Acyanotic, cool, dry and intact.   Resp: CTA without advnentitious sounds.   CV: S1S2  Chest: Symmetrical, equal lung expansion.   Extremities: (+) PPP no edema.        Impression:  Chest pain  HTN  HLD    Plan:   Pt seen by cardiology. No further inpatient workup.   Pt will be discharged to home today.    As per pt he will be returning back to [facility] on 8/15/24.  Updated pt's daughter re: discharge medication and plan of care.   Pt is hemodynamically stable for discharge to home today.      Jordyn Arias ANP-BC  Spectralink #[id number]

## 2024-07-29 NOTE — PHARMACOTHERAPY INTERVENTION NOTE - COMMENTS
Performed medication reconciliation and home medication list updated in prescription writer/ outpatient medication review. Medications verified with patient and pharmacy.     Home medications:  aspirin 81 mg oral tablet, chewable: 1 tab(s) orally once a day  atorvastatin 40 mg oral tablet: 1 tab(s) orally once a day (at bedtime)  clopidogrel 75 mg oral tablet: 1 tab(s) orally once a day  cyanocobalamin 1000 mcg oral tablet: 1 tab(s) orally once a day  lisinopril 10 mg oral tablet: 1 tab(s) orally once a day  metoprolol succinate 50 mg oral tablet, extended release: 1 tab(s) orally once a day    Changed: Lisinopril 20 mg --> 10 mg once daily    Recommendations:    Edie Solis  PharmD Candidate Class of 2025  [facility]   Performed medication reconciliation and home medication list updated in prescription writer/ outpatient medication review. Medications verified with patient and pharmacy.     Home medications:  aspirin 81 mg oral tablet, chewable: 1 tab(s) orally once a day  atorvastatin 40 mg oral tablet: 1 tab(s) orally once a day (at bedtime)  clopidogrel 75 mg oral tablet: 1 tab(s) orally once a day  cyanocobalamin 1000 mcg oral tablet: 1 tab(s) orally once a day  lisinopril 10 mg oral tablet: 1 tab(s) orally once a day  metoprolol succinate 50 mg oral tablet, extended release: 1 tab(s) orally once a day    Changed: Lisinopril 20 mg --> 10 mg once daily    Recommendations:  All home medications appropriately reconciled.    Edie Solis  PharmD Candidate Class of 2025  [facility]

## 2024-07-29 NOTE — DISCHARGE NOTE NURSING/CASE MANAGEMENT/SOCIAL WORK - PATIENT PORTAL LINK FT
You can access the FollowMyHealth Patient Portal offered by [facility] by registering at the following website: [email or web address]. By joining [facility]’s FollowMyHealth portal, you will also be able to view your health information using other applications (apps) compatible with our system.

## 2024-07-29 NOTE — DISCHARGE NOTE PROVIDER - CARE PROVIDERS DIRECT ADDRESSES
,[email or web address]

## 2024-07-29 NOTE — CHART NOTE - NSCHARTNOTEFT_GEN_A_CORE
Stress test notable for infarct in area consistent with prior STEMI. If no further chest pain no further cardiac workup indicated, follow-up closely with cardiologist.       Elizabeth Duran MD  PGY-5, Cardiology  Available on TEAMS    For all new consults  [email or web address]  Login: [email or web address]

## 2024-07-29 NOTE — DISCHARGE NOTE PROVIDER - PROVIDER TOKENS
PROVIDER:[TOKEN:[[id number]],FOLLOWUP:[1 week],ESTABLISHEDPATIENT:[T]] PROVIDER:[TOKEN:[[id number]],FOLLOWUP:[1 week],ESTABLISHEDPATIENT:[T]],FREE:[LAST:[Cardiology Clinic],PHONE:[phone]],FAX:[(   )    -],ADDRESS:[[address] [place] [address]],FOLLOWUP:[2 weeks]]

## 2024-07-29 NOTE — DISCHARGE NOTE PROVIDER - NSDCCPCAREPLAN_GEN_ALL_CORE_FT
PRINCIPAL DISCHARGE DIAGNOSIS  Diagnosis: Unstable angina  Assessment and Plan of Treatment:   HOME CARE INSTRUCTIONS  For the next few days, avoid physical activities that bring on chest pain. Continue physical activities as directed.  Do not smoke.  Avoid drinking alcohol.   Only take over-the-counter or prescription medicine for pain, discomfort, or fever as directed by your caregiver.  Follow your caregiver's suggestions for further testing if your chest pain does not go away.  Keep any follow-up appointments you made. If you do not go to an appointment, you could develop lasting (chronic) problems with pain. If there is any problem keeping an appointment, you must call to reschedule.   SEEK MEDICAL CARE IF:  You think you are having problems from the medicine you are taking. Read your medicine instructions carefully.  Your chest pain does not go away, even after treatment.  You develop a rash with blisters on your chest.  SEEK IMMEDIATE MEDICAL CARE IF:  You have increased chest pain or pain that spreads to your arm, neck, jaw, back, or abdomen.   You develop shortness of breath, an increasing cough, or you are coughing up blood.  You have severe back or abdominal pain, feel nauseous, or vomit.  You develop severe weakness, fainting, or chills.  You have a fever.  THIS IS AN EMERGENCY. Do not wait to see if the pain will go away. Get medical help at once. Call your local emergency services (_____________________). Do not drive yourself to the hospital.

## 2024-07-29 NOTE — DISCHARGE NOTE PROVIDER - HOSPITAL COURSE
HPI:  84 yo male PMHx HTN, HLD, STEMI 2018 s/p PCI x 4, status post [facility] 2023 with no PCI done, currently living in [facility], here visiting family presents to ED with 2 weeks of exertional chest tightness with shortness of breath.  Symptoms only felt with exertion, alleviated with rest.  Describes as "tightness" to middle of chest radiating to left arm.  Does have some fatigue and mild shortness of breath with exertion as well, none at rest.  Did not take his aspirin this morning.  Denies LE swelling, calf pain, cough, mops, fever/chills, abdominal pain, n/v/d. (26 Jul 2024 12:37)    Hospital Course:  Chest pain r/o ACS   -c/w asa/plavix   seen by cardio   s/p nuclear medicine exercise stress test of which the results was fixed and consistent with ischemia.    No further workup from a cardiology perspective.     CAD s/p stents   -had LHC in 2023 at this hospital : advised for medical managment   no new stents were placed   check echo     HTN :   c/w lisinopril and Toprol XL     HLD  -c/w lipitor         Important Medication Changes and Reason:  No changes  Active or Pending Issues Requiring Follow-up:    Advanced Directives:   [x ] Full code  [ ] DNR  [ ] Hospice    Discharge Diagnoses:  Chest Pain r/o ACS  HTN  HLD

## 2024-08-01 ENCOUNTER — INPATIENT (INPATIENT)
Facility: HOSPITAL | Age: 84
LOS: 0 days | Discharge: HOME | DRG: 287 | End: 2024-08-02
Attending: INTERNAL MEDICINE | Admitting: INTERNAL MEDICINE
Payer: MEDICAID

## 2024-08-01 VITALS
RESPIRATION RATE: 18 BRPM | HEART RATE: 74 BPM | WEIGHT: 179.9 LBS | DIASTOLIC BLOOD PRESSURE: 74 MMHG | SYSTOLIC BLOOD PRESSURE: 124 MMHG | OXYGEN SATURATION: 100 % | HEIGHT: 66 IN

## 2024-08-01 DIAGNOSIS — Z98.890 OTHER SPECIFIED POSTPROCEDURAL STATES: Chronic | ICD-10-CM

## 2024-08-01 LAB
BASOPHILS # BLD AUTO: 0.02 K/UL — SIGNIFICANT CHANGE UP (ref 0–0.2)
BASOPHILS NFR BLD AUTO: 0.3 % — SIGNIFICANT CHANGE UP (ref 0–2)
EOSINOPHIL # BLD AUTO: 0.06 K/UL — SIGNIFICANT CHANGE UP (ref 0–0.5)
EOSINOPHIL NFR BLD AUTO: 0.8 % — SIGNIFICANT CHANGE UP (ref 0–6)
HCT VFR BLD CALC: 32.4 % — LOW (ref 39–50)
HGB BLD-MCNC: 10.9 G/DL — LOW (ref 13–17)
IMM GRANULOCYTES NFR BLD AUTO: 0.4 % — SIGNIFICANT CHANGE UP (ref 0–0.9)
LYMPHOCYTES # BLD AUTO: 1.66 K/UL — SIGNIFICANT CHANGE UP (ref 1–3.3)
LYMPHOCYTES # BLD AUTO: 21.2 % — SIGNIFICANT CHANGE UP (ref 13–44)
MCHC RBC-ENTMCNC: 32 PG — SIGNIFICANT CHANGE UP (ref 27–34)
MCHC RBC-ENTMCNC: 33.6 GM/DL — SIGNIFICANT CHANGE UP (ref 32–36)
MCV RBC AUTO: 95 FL — SIGNIFICANT CHANGE UP (ref 80–100)
MONOCYTES # BLD AUTO: 0.66 K/UL — SIGNIFICANT CHANGE UP (ref 0–0.9)
MONOCYTES NFR BLD AUTO: 8.4 % — SIGNIFICANT CHANGE UP (ref 2–14)
NEUTROPHILS # BLD AUTO: 5.39 K/UL — SIGNIFICANT CHANGE UP (ref 1.8–7.4)
NEUTROPHILS NFR BLD AUTO: 68.9 % — SIGNIFICANT CHANGE UP (ref 43–77)
NRBC # BLD: 0 /100 WBCS — SIGNIFICANT CHANGE UP (ref 0–0)
PLATELET # BLD AUTO: 152 K/UL — SIGNIFICANT CHANGE UP (ref 150–400)
RBC # BLD: 3.41 M/UL — LOW (ref 4.2–5.8)
RBC # FLD: 12 % — SIGNIFICANT CHANGE UP (ref 10.3–14.5)
WBC # BLD: 7.82 K/UL — SIGNIFICANT CHANGE UP (ref 3.8–10.5)
WBC # FLD AUTO: 7.82 K/UL — SIGNIFICANT CHANGE UP (ref 3.8–10.5)

## 2024-08-01 PROCEDURE — 71045 X-RAY EXAM CHEST 1 VIEW: CPT | Mod: 26

## 2024-08-01 PROCEDURE — 99285 EMERGENCY DEPT VISIT HI MDM: CPT

## 2024-08-01 NOTE — ED ADULT NURSE NOTE - NSFALLUNIVINTERV_ED_ALL_ED
Bed/Stretcher in lowest position, wheels locked, appropriate side rails in place/Call bell, personal items and telephone in reach/Instruct patient to call for assistance before getting out of bed/chair/stretcher/Non-slip footwear applied when patient is off stretcher/[place] to call system/Physically safe environment - no spills, clutter or unnecessary equipment/Purposeful proactive rounding/Room/bathroom lighting operational, light cord in reach

## 2024-08-01 NOTE — ED ADULT NURSE NOTE - CAS TRG GEN SKIN COLOR
Normal for race negative normal/regular rate and rhythm/S1 S2 present/no gallops/no rub/no murmur EKG LBBB/regular rate and rhythm/S1 S2 present/no gallops/no rub/no murmur

## 2024-08-01 NOTE — ED ADULT NURSE NOTE - OBJECTIVE STATEMENT
84 yo M AOx4 BIB EMS from home with PMH of HTN, HDL & PSH of 4 stents (pt on Plavix and Aspirin) c/o middle chest pain. As per EMS, onset of chest pain started today prompting pt to call EMS. Pt received 1 aspirin prior to EMS Arrival. EMS reports pt was given 324 mg of aspirin and 1 nitroglycerin tablet- partial relief from 10/10 to 3/10. Pt endorses burning like pain in the middle of chest. Pt reports recent discharge from [facility] on Monday for unstable angina. Pt placed on CM- NSR. EKG done at bedside. Pt also endorses nausea, denies vomiting. Pt initially presents to [facility] ED in no acute distress with unlabored breathing. Call bell in reach. Stretcher in lowest position locked with blanket given. Comfort care and safety measures provided. Pt denies sob, abd pain, V/D, fevers, chills, headache, dizziness, dysuria, blood in urine and stool.

## 2024-08-01 NOTE — ED ADULT NURSE NOTE - TEMPLATE
[facility] pt was contacted, pt c/o headache, sweats, generalized muscle aches, vomiting since last night x4. Pt tried drinking orange juice this morning and was unable to keep it down. Pt states she's 30 weeks pregnant.     Pt was advised to go to ED/UCC per triage protocol.  Pt verbalized understanding.    Reason for Disposition   Drinking very little and has signs of dehydration (e.g., no urine > 12 hours, very dry mouth, very lightheaded)    Additional Information   Negative: Shock suspected (e.g., cold/pale/clammy skin, too weak to stand, low BP, rapid pulse)   Negative: Difficult to awaken or acting confused (e.g., disoriented, slurred speech)   Negative: Sounds like a life-threatening emergency to the triager   Negative: SEVERE vomiting (e.g., 6 or more times/day) (Exception: patient sounds well, is drinking liquids, does not sound dehydrated, and vomiting has lasted less than 24 hours)   Negative: MODERATE vomiting (e.g., 3 - 5 times/day) and age > 60 years   Negative: Vomiting contains bile (green color)   Negative: Vomiting red blood or black (coffee ground) material   Negative: Insulin-dependent diabetes and glucose > 240 mg/dL (13 mmol/L)   Negative: Recent head injury (within 3 days)   Negative: Recent abdominal injury (within 7 days)   Negative: Severe pain in one eye    Protocols used: VOMITING-A-OH       Cardiac

## 2024-08-02 ENCOUNTER — TRANSCRIPTION ENCOUNTER (OUTPATIENT)
Age: 84
End: 2024-08-02

## 2024-08-02 VITALS
SYSTOLIC BLOOD PRESSURE: 144 MMHG | HEART RATE: 70 BPM | RESPIRATION RATE: 16 BRPM | OXYGEN SATURATION: 97 % | DIASTOLIC BLOOD PRESSURE: 66 MMHG | TEMPERATURE: 98 F

## 2024-08-02 DIAGNOSIS — I20.0 UNSTABLE ANGINA: ICD-10-CM

## 2024-08-02 LAB
ALBUMIN SERPL ELPH-MCNC: 3.7 G/DL — SIGNIFICANT CHANGE UP (ref 3.3–5)
ALP SERPL-CCNC: 73 U/L — SIGNIFICANT CHANGE UP (ref 40–120)
ALT FLD-CCNC: 21 U/L — SIGNIFICANT CHANGE UP (ref 10–45)
ANION GAP SERPL CALC-SCNC: 11 MMOL/L — SIGNIFICANT CHANGE UP (ref 5–17)
APTT BLD: 29.1 SEC — SIGNIFICANT CHANGE UP (ref 24.5–35.6)
AST SERPL-CCNC: 21 U/L — SIGNIFICANT CHANGE UP (ref 10–40)
BILIRUB SERPL-MCNC: 0.4 MG/DL — SIGNIFICANT CHANGE UP (ref 0.2–1.2)
BUN SERPL-MCNC: 13 MG/DL — SIGNIFICANT CHANGE UP (ref 7–23)
CALCIUM SERPL-MCNC: 9 MG/DL — SIGNIFICANT CHANGE UP (ref 8.4–10.5)
CHLORIDE SERPL-SCNC: 100 MMOL/L — SIGNIFICANT CHANGE UP (ref 96–108)
CK MB BLD-MCNC: 2 % — SIGNIFICANT CHANGE UP (ref 0–3.5)
CK MB CFR SERPL CALC: 2.9 NG/ML — SIGNIFICANT CHANGE UP (ref 0–6.7)
CK SERPL-CCNC: 144 U/L — SIGNIFICANT CHANGE UP (ref 30–200)
CO2 SERPL-SCNC: 21 MMOL/L — LOW (ref 22–31)
CREAT SERPL-MCNC: 1.02 MG/DL — SIGNIFICANT CHANGE UP (ref 0.5–1.3)
EGFR: 74 ML/MIN/1.73M2 — SIGNIFICANT CHANGE UP
GLUCOSE SERPL-MCNC: 106 MG/DL — HIGH (ref 70–99)
INR BLD: 1.22 RATIO — HIGH (ref 0.85–1.18)
POTASSIUM SERPL-MCNC: 4.3 MMOL/L — SIGNIFICANT CHANGE UP (ref 3.5–5.3)
POTASSIUM SERPL-SCNC: 4.3 MMOL/L — SIGNIFICANT CHANGE UP (ref 3.5–5.3)
PROT SERPL-MCNC: 6.7 G/DL — SIGNIFICANT CHANGE UP (ref 6–8.3)
PROTHROM AB SERPL-ACNC: 13.3 SEC — HIGH (ref 9.5–13)
SODIUM SERPL-SCNC: 132 MMOL/L — LOW (ref 135–145)
TROPONIN T, HIGH SENSITIVITY RESULT: 19 NG/L — SIGNIFICANT CHANGE UP (ref 0–51)
TROPONIN T, HIGH SENSITIVITY RESULT: 20 NG/L — SIGNIFICANT CHANGE UP (ref 0–51)

## 2024-08-02 PROCEDURE — 71045 X-RAY EXAM CHEST 1 VIEW: CPT

## 2024-08-02 PROCEDURE — 85025 COMPLETE CBC W/AUTO DIFF WBC: CPT

## 2024-08-02 PROCEDURE — C1887: CPT

## 2024-08-02 PROCEDURE — 83880 ASSAY OF NATRIURETIC PEPTIDE: CPT

## 2024-08-02 PROCEDURE — 99261: CPT

## 2024-08-02 PROCEDURE — 84132 ASSAY OF SERUM POTASSIUM: CPT

## 2024-08-02 PROCEDURE — 80053 COMPREHEN METABOLIC PANEL: CPT

## 2024-08-02 PROCEDURE — 82947 ASSAY GLUCOSE BLOOD QUANT: CPT

## 2024-08-02 PROCEDURE — 85018 HEMOGLOBIN: CPT

## 2024-08-02 PROCEDURE — 84295 ASSAY OF SERUM SODIUM: CPT

## 2024-08-02 PROCEDURE — 82435 ASSAY OF BLOOD CHLORIDE: CPT

## 2024-08-02 PROCEDURE — 83605 ASSAY OF LACTIC ACID: CPT

## 2024-08-02 PROCEDURE — C1894: CPT

## 2024-08-02 PROCEDURE — 96374 THER/PROPH/DIAG INJ IV PUSH: CPT

## 2024-08-02 PROCEDURE — 83735 ASSAY OF MAGNESIUM: CPT

## 2024-08-02 PROCEDURE — 85730 THROMBOPLASTIN TIME PARTIAL: CPT

## 2024-08-02 PROCEDURE — C1769: CPT

## 2024-08-02 PROCEDURE — 99223 1ST HOSP IP/OBS HIGH 75: CPT

## 2024-08-02 PROCEDURE — 93454 CORONARY ARTERY ANGIO S&I: CPT

## 2024-08-02 PROCEDURE — 85014 HEMATOCRIT: CPT

## 2024-08-02 PROCEDURE — 85610 PROTHROMBIN TIME: CPT

## 2024-08-02 PROCEDURE — 82330 ASSAY OF CALCIUM: CPT

## 2024-08-02 PROCEDURE — 99152 MOD SED SAME PHYS/QHP 5/>YRS: CPT

## 2024-08-02 PROCEDURE — 82803 BLOOD GASES ANY COMBINATION: CPT

## 2024-08-02 PROCEDURE — 93454 CORONARY ARTERY ANGIO S&I: CPT | Mod: 26

## 2024-08-02 PROCEDURE — 82553 CREATINE MB FRACTION: CPT

## 2024-08-02 PROCEDURE — 99285 EMERGENCY DEPT VISIT HI MDM: CPT | Mod: 25

## 2024-08-02 PROCEDURE — 84484 ASSAY OF TROPONIN QUANT: CPT

## 2024-08-02 PROCEDURE — 82550 ASSAY OF CK (CPK): CPT

## 2024-08-02 RX ORDER — ASPIRIN 500 MG
81 TABLET ORAL DAILY
Refills: 0 | Status: DISCONTINUED | OUTPATIENT
Start: 2024-08-02 | End: 2024-08-02

## 2024-08-02 RX ORDER — PANTOPRAZOLE SODIUM 20 MG/1
1 TABLET, DELAYED RELEASE ORAL
Qty: 90 | Refills: 3
Start: 2024-08-02 | End: 2025-07-27

## 2024-08-02 RX ORDER — CLOPIDOGREL BISULFATE 75 MG/1
75 TABLET, FILM COATED ORAL DAILY
Refills: 0 | Status: DISCONTINUED | OUTPATIENT
Start: 2024-08-02 | End: 2024-08-02

## 2024-08-02 RX ORDER — LISINOPRIL 10 MG/1
20 TABLET ORAL DAILY
Refills: 0 | Status: DISCONTINUED | OUTPATIENT
Start: 2024-08-02 | End: 2024-08-02

## 2024-08-02 RX ORDER — HEPARIN SODIUM 1000 [USP'U]/ML
4000 INJECTION, SOLUTION INTRAVENOUS; SUBCUTANEOUS ONCE
Refills: 0 | Status: COMPLETED | OUTPATIENT
Start: 2024-08-02 | End: 2024-08-02

## 2024-08-02 RX ORDER — LISINOPRIL 10 MG/1
10 TABLET ORAL DAILY
Refills: 0 | Status: DISCONTINUED | OUTPATIENT
Start: 2024-08-02 | End: 2024-08-02

## 2024-08-02 RX ORDER — METOPROLOL TARTRATE 100 MG
50 TABLET ORAL
Refills: 0 | Status: DISCONTINUED | OUTPATIENT
Start: 2024-08-02 | End: 2024-08-02

## 2024-08-02 RX ORDER — PANTOPRAZOLE SODIUM 20 MG/1
40 TABLET, DELAYED RELEASE ORAL
Refills: 0 | Status: DISCONTINUED | OUTPATIENT
Start: 2024-08-02 | End: 2024-08-02

## 2024-08-02 RX ORDER — NITROGLYCERIN 400 UG/1
1 AEROSOL, METERED SUBLINGUAL
Qty: 90 | Refills: 0
Start: 2024-08-02 | End: 2024-08-31

## 2024-08-02 RX ORDER — HEPARIN SODIUM 1000 [USP'U]/ML
4000 INJECTION, SOLUTION INTRAVENOUS; SUBCUTANEOUS EVERY 6 HOURS
Refills: 0 | Status: DISCONTINUED | OUTPATIENT
Start: 2024-08-02 | End: 2024-08-02

## 2024-08-02 RX ORDER — BACTERIOSTATIC SODIUM CHLORIDE 0.9 %
1000 VIAL (ML) INJECTION
Refills: 0 | Status: DISCONTINUED | OUTPATIENT
Start: 2024-08-02 | End: 2024-08-02

## 2024-08-02 RX ORDER — METOPROLOL TARTRATE 100 MG
50 TABLET ORAL DAILY
Refills: 0 | Status: DISCONTINUED | OUTPATIENT
Start: 2024-08-02 | End: 2024-08-02

## 2024-08-02 RX ORDER — HEPARIN SODIUM 1000 [USP'U]/ML
INJECTION, SOLUTION INTRAVENOUS; SUBCUTANEOUS
Qty: 25000 | Refills: 0 | Status: DISCONTINUED | OUTPATIENT
Start: 2024-08-02 | End: 2024-08-02

## 2024-08-02 RX ORDER — BACTERIOSTATIC SODIUM CHLORIDE 0.9 %
250 VIAL (ML) INJECTION ONCE
Refills: 0 | Status: DISCONTINUED | OUTPATIENT
Start: 2024-08-02 | End: 2024-08-02

## 2024-08-02 RX ORDER — METOPROLOL TARTRATE 100 MG
1 TABLET ORAL
Qty: 180 | Refills: 3
Start: 2024-08-02 | End: 2025-07-27

## 2024-08-02 RX ADMIN — HEPARIN SODIUM 4000 UNIT(S): 1000 INJECTION, SOLUTION INTRAVENOUS; SUBCUTANEOUS at 09:25

## 2024-08-02 RX ADMIN — CLOPIDOGREL BISULFATE 75 MILLIGRAM(S): 75 TABLET, FILM COATED ORAL at 09:26

## 2024-08-02 RX ADMIN — Medication 50 MILLIGRAM(S): at 13:08

## 2024-08-02 RX ADMIN — HEPARIN SODIUM 1000 UNIT(S)/HR: 1000 INJECTION, SOLUTION INTRAVENOUS; SUBCUTANEOUS at 09:26

## 2024-08-02 RX ADMIN — LISINOPRIL 20 MILLIGRAM(S): 10 TABLET ORAL at 13:09

## 2024-08-02 RX ADMIN — PANTOPRAZOLE SODIUM 40 MILLIGRAM(S): 20 TABLET, DELAYED RELEASE ORAL at 13:09

## 2024-08-02 RX ADMIN — Medication 81 MILLIGRAM(S): at 09:26

## 2024-08-02 NOTE — H&P ADULT - NSHPLABSRESULTS_GEN_ALL_CORE
10.9   7.82  )-----------( 152      ( 01 Aug 2024 23:29 )             32.4   08-01    132<L>  |  100  |  13  ----------------------------<  106<H>  4.3   |  21<L>  |  1.02    Ca    9.0      01 Aug 2024 23:29  Mg     1.8     08-01    TPro  6.7  /  Alb  3.7  /  TBili  0.4  /  DBili  x   /  AST  21  /  ALT  21  /  AlkPhos  73  08-01

## 2024-08-02 NOTE — ASU DISCHARGE PLAN (ADULT/PEDIATRIC) - CARE PROVIDER_API CALL
Kavya Wiseman  Cardiovascular Disease  [address] [place] [address]  Phone: [phone]  Fax: [phone]  Scheduled Appointment: 08/09/2024 02:30 PM   Kavya Wiseman  August 9 at 2 30 pm  CARDIOLOGY GENERAL/OUTPATIENT   [address]  [place]  Phone: (   )    -  Fax: (   )    -  Follow Up Time:

## 2024-08-02 NOTE — CONSULT NOTE ADULT - SUBJECTIVE AND OBJECTIVE BOX
Brittney Pascal MD   Cardiology Fellow  [phone]   All Cardiology service information can be found 24/7 on [email or web address], password: [email or web address]    Patient seen and evaluated at bedside    HPI:  This is a 84 y/o [place] M with PMH of HTN, HLD, STEMI 2018 s/p PCI (mid-LAD, distal circumflex 2018), HFrecEF who presents for episode of sudden midsternal pain around 8 PM 08/01/2024 while he was sitting on the couch. Says he can walk 1 block without chest pain, but has chest pain with exertion.     Patient recently admitted for similar chest pain end of 07/2024. Had TTE and NST completed and due to the same chest pain recurring while he was at home and after he was discharged, he decided to come back to the emergency room. Continues to have episodes of such chest pain.      Says he takes Aspirin daily, sometimes forgets to take his plavix.       PMHx:   Hypertension  Hyperlipidemia      PSHx:   H/O inguinal hernia repair        Allergies:  No Known Allergies      Current Medications:       FAMILY HISTORY:  No pertinent family history in first degree relatives          REVIEW OF SYSTEMS:  CONSTITUTIONAL: No weakness, fevers or chills  EYES/ENT: No visual changes;  No dysphagia  NECK: No pain or stiffness  RESPIRATORY: No cough, wheezing, hemoptysis; No shortness of breath  CARDIOVASCULAR: No chest pain or palpitations; No lower extremity edema  GASTROINTESTINAL: No abdominal or epigastric pain. No nausea, vomiting, or hematemesis; No diarrhea or constipation. No melena or hematochezia.  BACK: No back pain  GENITOURINARY: No dysuria, frequency or hematuria  NEUROLOGICAL: No numbness or weakness  SKIN: No itching, burning, rashes, or lesions   All other review of systems is negative unless indicated above.    Physical Exam:  T(F): 97.4 (08-02), Max: 98.3 (08-01)  HR: 67 (08-02) (64 - 74)  BP: 145/78 (08-02) (112/63 - 145/78)  RR: 18 (08-02)  SpO2: 100% (08-02)  GEN: NAD  HEENT: EOMI, clear sclera  PULM: CTA b/l, no wheeze  CV: RRR S1 S2, no murmur, no JVD  ABD: S, NT, ND  EXT: WWP, no edema  PSYCH: normal affect  SKIN: No rash    CXR: Personally reviewed    Labs: Personally reviewed                        10.9   7.82  )-----------( 152      ( 01 Aug 2024 23:29 )             32.4     08-01    132<L>  |  100  |  13  ----------------------------<  106<H>  4.3   |  21<L>  |  1.02    Ca    9.0      01 Aug 2024 23:29  Mg     1.8     08-01    TPro  6.7  /  Alb  3.7  /  TBili  0.4  /  DBili  x   /  AST  21  /  ALT  21  /  AlkPhos  73  08-01    PT/INR - ( 01 Aug 2024 23:29 )   PT: 13.3 sec;   INR: 1.22 ratio         PTT - ( 01 Aug 2024 23:29 )  PTT:29.1 sec    CARDIAC MARKERS ( 02 Aug 2024 00:55 )  20 ng/L / x     / x     / 144 U/L / x     / 2.9 ng/mL  CARDIAC MARKERS ( 01 Aug 2024 23:29 )  19 ng/L / x     / x     / x     / x     / x      CARDIAC MARKERS ( 26 Jul 2024 13:44 )  14 ng/L / x     / x     / x     / x     / x      CARDIAC MARKERS ( 26 Jul 2024 10:37 )  17 ng/L / x     / x     / x     / x     / x                  Thyroid Stimulating Hormone, Serum: 1.53 uIU/mL (07-27 @ 06:52)

## 2024-08-02 NOTE — ED PROVIDER NOTE - PROGRESS NOTE DETAILS
Patient notified, please sign Cards was consulted for this patient's unstable angina, spoke to Dr. Pascal.  Patient symptoms history and EKG was discussed with pipe.  Cards to see

## 2024-08-02 NOTE — ED PROVIDER NOTE - EKG/XRAY ADDITIONAL INFORMATION
EKG independent interpretation by me sinus rhythm with 1st degree av block, rate 65, pr 252, qrs 72, qtc 382, no diagnostic ischemic changes. new std lead I. EKG independent interpretation by me sinus rhythm with 1st degree av block, rate 65, pr 252, qrs 72, qtc 382, no diagnostic ischemic changes. new std lead I.    I, Dr. David Pascal, independently interpreted the : CXR  no focal opacity.

## 2024-08-02 NOTE — CONSULT NOTE ADULT - ATTENDING COMMENTS
82yo M PMHx STEMI in 2018 with PCI to mid LAD and distal Cx admitted last week for chest pain with exercise spect revealing a predominantly fixed defect with mild periinfarct ischemia in the distal anterior/apex and TTE showing a small new WMA when compared to prior. He was discharge home with conservative management but has continued to have chest pain with exertion and developed rest pain last night. EKG with new TWI in lead I though remainder of lateral TWI unchanged from prior EKG at recent admission. Will treat for unstable and angina and go for cardiac catheterization.    # Unstable Angina  # CAD (STEMI s/p PCI mLAD and dCx in 2018)  - continue PTA ASA 81mg qd (loaded on arrival)  - continue PTA plavix 75mg qd  - resume PTA atorvastatin 40mg qhs  - hold PTA lisinopril 20mg qd and resume post cath as renal function/BP allows  - resume PTA toprol 50mg qd   - start heparin gtt  - check A1c  - tele  - cardiac cath today, ok for small breakfast and then NPO

## 2024-08-02 NOTE — H&P ADULT - ASSESSMENT
A/P     Chest pain / unstable angina :   was in hospital last week and seen by house cardio   had NST : was neg for ischemic changes and was d/c home on home emds   house cardio consulted , and pt. is being seen   plan for cardiac cath today   c/w asa/plavix   started on heparin GTT       CAD s/p stents   -had LHC in 2023 at this hospital : advised for medical managment   no new stents were placed   check echo     HTN :   c/w lisinopril and Toprol XL     HLD  -c/w lipitor     advance care planning : d/w patient regarding advance directive. d/w him regarding intubation / CPR if need arise. Agrees for everything for now. remain full code. time spend over 15 min

## 2024-08-02 NOTE — ASU DISCHARGE PLAN (ADULT/PEDIATRIC) - PROVIDER TOKENS
PROVIDER:[TOKEN:[[id number]],SCHEDULEDAPPT:[08/09/2024],SCHEDULEDAPPTTIME:[02:30 PM]] FREE:[LAST:[[place]],FIRST:[Kavya],PHONE:[(   )    -],FAX:[(   )    -],ADDRESS:[August 9 at 2 30 pm  CARDIOLOGY GENERAL/OUTPATIENT   [address] [place] [address]]]

## 2024-08-02 NOTE — PATIENT PROFILE ADULT - NSTOBACCO TYPE_GEN_A_CORE_RD
Patient presents with a one day history of acute, L sided rib pain, progressively getting worse and worse with deep breaths. CT shows new bilateral rib fractures. The fracture of the left sixth rib appears acute. The fracture of the right sixth rib appear subacute, as there is adjacent callus formation. Patient denies recent trauma, falls, or coughing.   -Tylenol 1g q6h standing for pain  -Dilaudid 1 mg IV q3 as needed for severe pain   -PT consult in AM   -Patient currently denies SOB, chest pain, or difficulty breathing. Appears comfortable on 4 L NC.
Cigarettes

## 2024-08-02 NOTE — ED PROVIDER NOTE - OBJECTIVE STATEMENT
83-year-old [place] male with past medical history of : 84 yo male PMHx HTN, HLD, STEMI 2018 s/p PCI x 4, status post [facility] 2023 with no PCI done, presents today for constant midsternal chest pain radiating to his left shoulder.  He was sitting at rest earlier today, eating fruit, when the nonstop chest pain started.  When he started feeling this pain, he took a lisinopril and clopidogrel, which did not help. His wife gave him 81mg of aspirin. He subsequently called an ambulance which provided him 324 mg of aspirin and sublingual nitroglycerin.  He feels short of breath due to the pain.  He notes that his current feelings at rest are the same as when he was undergoing an exercise stress test a few days ago.  He denies fever, chills, night sweats, cough, hemoptysis, palpitations, abdominal pain, nausea, vomiting, pedal edema.

## 2024-08-02 NOTE — ED PROVIDER NOTE - CLINICAL SUMMARY MEDICAL DECISION MAKING FREE TEXT BOX
83-year-old Citizen of [place] male with past medical history of : 84 yo male PMHx HTN, HLD, STEMI 2018 s/p PCI x 4, status post [facility] 2023 with no PCI done, presents today for constant midsternal chest pain radiating to his left shoulder.      PE unremarkable as above.    Differential diagnosis includes but is not limited to ACS, dysrhythmia, electrolyte abnormality, unstable angina.  Suspect unstable angina with the constant nonstop chest pain the patient is experiencing now, despite medical management.  EKG without ST segment elevation but lead I has approximately a 0.5 mm ST depression which is new compared to prior EKG several days ago    Plan includes labs and imaging for the above, plan to touch base with cardiology who he has a known patient of

## 2024-08-02 NOTE — H&P ADULT - OPHTHALMOLOGIC
Spoke with pt regarding request to reschedule hemorrhoidectomy to date after 10/6. Offered pt to be scheduled on 10/11. Pt verbally confirmed new date. Sending message to surgery schedulers.       ----- Message from Shaina Perez sent at 9/21/2023  2:48 PM CDT -----  Regarding: Harsha Procedure  Contact: @ [phone]  Pt is calling to speak with someone in the office to r/s the procedure that is sheyla for 9/27/2023. Pt is asking for a return call back to r/s for a better date and time. Pt is asking for it to be after the 6th of next month. Thanks.     
negative

## 2024-08-02 NOTE — ED PROVIDER NOTE - PHYSICAL EXAMINATION
Jose Alfredo Enamorado DO (PGY1)   Physical Exam:    Gen: NAD, AOx3  Head: NCAT  HEENT: EOMI, PEERLA, pink and moist mucous membranes  Lung: CTAB, no respiratory distress, no wheezes/rhonchi/rales B/L  CV: RRR, no murmurs, rubs or gallops  Abd: soft, NT, ND, no guarding, no rigidity, no rebound tenderness, no CVA tenderness   MSK: no visible deformities, ROM normal in UE/LE, no back pain  Neuro: No focal sensory or motor deficits. Sensation intact to light touch all extremities.  Skin: Warm, well perfused, no rash, no leg swelling  Psych: normal affect, calm

## 2024-08-02 NOTE — H&P ADULT - NSHPPHYSICALEXAM_GEN_ALL_CORE
Vital Signs Last 24 Hrs  T(C): 36.7 (02 Aug 2024 10:54), Max: 36.8 (01 Aug 2024 23:32)  T(F): 98.1 (02 Aug 2024 10:54), Max: 98.3 (01 Aug 2024 23:32)  HR: 83 (02 Aug 2024 10:54) (64 - 83)  BP: 159/75 (02 Aug 2024 10:54) (112/63 - 159/75)  BP(mean): --  RR: 17 (02 Aug 2024 10:54) (17 - 18)  SpO2: 99% (02 Aug 2024 10:54) (99% - 100%)    Parameters below as of 02 Aug 2024 10:54  Patient On (Oxygen Delivery Method): room air    heent : nc/at, no pallor   neck : supple, no JVD  Lungs : B/L clear , no w/r/r  heart: s1s2 nml  abd : soft, NABS, NT/ND  ext : no e/c/c, pulses 2+  neuro: aaox3 , no focal deficit

## 2024-08-02 NOTE — ED PROVIDER NOTE - ATTENDING CONTRIBUTION TO CARE
83M here for constant severe midsternal chest pressure that occurred at rest, similar to prior MI, recently admitted for chest pain with a nonactionable stress test (reviewed: areas of ischemia correlate to prior STEMI), rec med management. No associated nausea, vomiting, diaphoresis, shortness of breath. Hx complicated by demographics ([place] African), HTN, HLD, STEMI 2018 s/p PCI x 4, status post [facility] 2023 with no PCI done. received 324mg asa by ems, took 81mg asa before ems arrived and received 1 subL 0.4mg by EMs with improvement of sxs.    Hemodynamically stable. NAD. AAOx4 (person, place, time, event). PERRL 3mm, EOMI w/o nystagmus, well hydrated, RRR, no audible cardiac murmurs. clear lungs, no inc work of breathing. benign abdomen, - tavera, no peritoneal signs, no cva ttp. no visible rashes nor deformities, no signs of jaundice, fluid overload, nor anemia. symm calves, no edema. full str/rom/neurovasc all 4 extrem. 2+ distal pulses, symm all 4 extrem Steady gait.     concern for unstable angina, eval for nstemi. No e/o on ekg of stemi. Do not think acute aortic path. No volume overloaded. Doubt hepatobiliary, infx. Check labs, cardiac biomarkers, EKG, CXR, place on cardiac monitor for telemetry monitoring. Discuss with cards for admission.

## 2024-08-02 NOTE — H&P ADULT - HISTORY OF PRESENT ILLNESS
83-year-old [place] male with past medical history of : 82 yo male PMHx HTN, HLD, STEMI 2018 s/p PCI x 4, status post [facility] 2023 with no PCI done, presents today for constant midsternal chest pain radiating to his left shoulder.  He was sitting at rest earlier today, eating fruit, when the nonstop chest pain started.  When he started feeling this pain, he took a lisinopril and clopidogrel, which did not help. His wife gave him 81mg of aspirin. He subsequently called an ambulance which provided him 324 mg of aspirin and sublingual nitroglycerin.  He feels short of breath due to the pain.  He notes that his current feelings at rest are the same as when he was undergoing an exercise stress test a few days ago.  He denies fever, chills, night sweats, cough, hemoptysis, palpitations, abdominal pain, nausea, vomiting, pedal edema.

## 2024-08-02 NOTE — CONSULT NOTE ADULT - ASSESSMENT
This is a 84 y/o [place] M with PMH of HTN, HLD, STEMI 2018 s/p PCI (mid-LAD, distal circumflex 2018), HFrecEF who presents for episode of sudden midsternal pain around 8 PM 08/01/2024 while he was sitting on the couch. Says he can walk 1 block without chest pain, but has chest pain with exertion.     - Likely unstable angina  - Will discuss proceeding with cath in the AM to assess; low chance of false negative NST but due to possibility, can consider cath for evaluation of recurrent chest pain   - NST 07/28/24 with infarct in area consistent with prior STEMI. Recommendation from cardiology at that time that if no further chest pain, did not warrant further workup  - TTE 07/28/24 with LVEF 69%. Apical septal segment, apical anterior, and apex abnormal WMA. Normal LV diastolic function.   - Can consider titrating anti-anginals if BP room allows  - No heparin gtt as troponin 19->20   - ECG with q waves in I, V1, V2 and nonspecific TWI   - Received nitroglycerin during EMS transport  - S/p ASA load; cont ASA 81mg daily   - Cont plavix   - Cont home meds  - Telemetry monitoring   - Monitor lytes; maintain K>4, Mag>2       Brittney Pascal MD   Cardiology Fellow 
none

## 2024-08-14 ENCOUNTER — NON-APPOINTMENT (OUTPATIENT)
Age: 84
End: 2024-08-14

## 2024-08-14 ENCOUNTER — APPOINTMENT (OUTPATIENT)
Dept: CARDIOLOGY | Facility: CLINIC | Age: 84
End: 2024-08-14
Payer: MEDICAID

## 2024-08-14 VITALS
HEIGHT: 66 IN | HEART RATE: 59 BPM | BODY MASS INDEX: 28.93 KG/M2 | SYSTOLIC BLOOD PRESSURE: 158 MMHG | WEIGHT: 180 LBS | DIASTOLIC BLOOD PRESSURE: 77 MMHG | OXYGEN SATURATION: 100 %

## 2024-08-14 DIAGNOSIS — I25.10 ATHEROSCLEROTIC HEART DISEASE OF NATIVE CORONARY ARTERY W/OUT ANGINA PECTORIS: ICD-10-CM

## 2024-08-14 DIAGNOSIS — E78.5 HYPERLIPIDEMIA, UNSPECIFIED: ICD-10-CM

## 2024-08-14 DIAGNOSIS — I10 ESSENTIAL (PRIMARY) HYPERTENSION: ICD-10-CM

## 2024-08-14 PROCEDURE — 99215 OFFICE O/P EST HI 40 MIN: CPT | Mod: 25

## 2024-08-14 PROCEDURE — 93000 ELECTROCARDIOGRAM COMPLETE: CPT

## 2024-08-14 RX ORDER — ISOSORBIDE MONONITRATE 30 MG/1
30 TABLET, EXTENDED RELEASE ORAL DAILY
Qty: 90 | Refills: 3 | Status: ACTIVE | COMMUNITY
Start: 2024-08-14 | End: 1900-01-01

## 2024-08-14 RX ORDER — METOPROLOL TARTRATE 50 MG/1
50 TABLET, FILM COATED ORAL TWICE DAILY
Refills: 0 | Status: ACTIVE | COMMUNITY

## 2024-08-14 RX ORDER — CLOPIDOGREL 75 MG/1
75 TABLET, FILM COATED ORAL
Refills: 0 | Status: ACTIVE | COMMUNITY

## 2024-08-14 RX ORDER — ATORVASTATIN CALCIUM 40 MG/1
40 TABLET, FILM COATED ORAL
Refills: 0 | Status: ACTIVE | COMMUNITY

## 2024-08-14 RX ORDER — PANTOPRAZOLE SODIUM 40 MG/1
40 TABLET, DELAYED RELEASE ORAL
Refills: 0 | Status: ACTIVE | COMMUNITY

## 2024-08-14 RX ORDER — LISINOPRIL 20 MG/1
20 TABLET ORAL
Refills: 0 | Status: ACTIVE | COMMUNITY

## 2024-08-14 RX ORDER — CYANOCOBALAMIN (VITAMIN B-12) 1000 MCG
1000 TABLET ORAL
Refills: 0 | Status: ACTIVE | COMMUNITY

## 2024-08-14 NOTE — REVIEW OF SYSTEMS
[Feeling Fatigued] : feeling fatigued [SOB] : no shortness of breath [Dyspnea on exertion] : not dyspnea during exertion [Chest Discomfort] : no chest discomfort [Lower Ext Edema] : no extremity edema [Palpitations] : no palpitations [Constipation] : constipation [Negative] : Heme/Lymph

## 2024-08-14 NOTE — PHYSICAL EXAM
[No Xanthelasma] : no xanthelasma [Normal S1, S2] : normal S1, S2 [No Murmur] : no murmur [Soft] : abdomen soft [Non Tender] : non-tender [No Edema] : no edema [No Rash] : no rash [No Skin Lesions] : no skin lesions [Moves all extremities] : moves all extremities [No Focal Deficits] : no focal deficits [Normal Speech] : normal speech [Alert and Oriented] : alert and oriented [Normal memory] : normal memory [de-identified] : NAD [de-identified] : no carotid bruit, no JVD [de-identified] : CTAB [de-identified] : Ambulates with cane

## 2024-08-14 NOTE — REASON FOR VISIT
[Coronary Artery Disease] : coronary artery disease [FreeTextEntry1] : 82 yo male PMHx HTN, HLD, STEMI 2018 s/p PCI x 4, currently living in [facility], here visiting family presents to ED a few weeks ago with exertional chest pain, and shortness of breath. Nonobstructive CAD with patent stents on coronary angiography. Here for post-cath follow-up.   He is returning to [facility] Aug 18th, will be back end of May.  Gets his medications through the pharmacy there; has to pay out of pocket for a few of the medications.  Recent stress test with scar. Cath 7/2023 with nonobstructive CAD. on DAPT.  Radial access site c/d/i. No bruit. No more chest pain or SOB. Has some intermittent pains or needles in the left neck and side of the scalp. Present for years.

## 2024-08-14 NOTE — ASSESSMENT
[FreeTextEntry1] : HTN, HLD, CAD, STEMI 2018 s/p PCI x 4 Recent ED visit for CP, cath with nonobstructive CAD  -optimize GDMT, HR 50s, can't titrate BB, will add Imdur, can titrate and add Ranolazine if needed -RF modification, watch salt intake, DAPT -returning home next week, will follow-up next year when he returns to the

## 2025-06-18 ENCOUNTER — APPOINTMENT (OUTPATIENT)
Dept: CARDIOLOGY | Facility: CLINIC | Age: 85
End: 2025-06-18